# Patient Record
Sex: FEMALE | Race: WHITE | NOT HISPANIC OR LATINO | Employment: OTHER | URBAN - METROPOLITAN AREA
[De-identification: names, ages, dates, MRNs, and addresses within clinical notes are randomized per-mention and may not be internally consistent; named-entity substitution may affect disease eponyms.]

---

## 2017-02-08 ENCOUNTER — LAB VISIT (OUTPATIENT)
Dept: LAB | Facility: HOSPITAL | Age: 67
End: 2017-02-08
Attending: PSYCHIATRY & NEUROLOGY
Payer: MEDICARE

## 2017-02-08 DIAGNOSIS — Z79.899 ENCOUNTER FOR LONG-TERM (CURRENT) USE OF OTHER MEDICATIONS: ICD-10-CM

## 2017-02-08 DIAGNOSIS — F33.3 MAJOR DEPRESSIVE DISORDER, RECURRENT EPISODE, SEVERE, SPECIFIED AS WITH PSYCHOTIC BEHAVIOR: Primary | ICD-10-CM

## 2017-02-08 LAB
ALBUMIN SERPL BCP-MCNC: 3.2 G/DL
ALP SERPL-CCNC: 79 U/L
ALT SERPL W/O P-5'-P-CCNC: 15 U/L
ANION GAP SERPL CALC-SCNC: 12 MMOL/L
AST SERPL-CCNC: 11 U/L
BASOPHILS # BLD AUTO: 0.02 K/UL
BASOPHILS NFR BLD: 0.2 %
BILIRUB SERPL-MCNC: 0.3 MG/DL
BUN SERPL-MCNC: 22 MG/DL
CALCIUM SERPL-MCNC: 9 MG/DL
CHLORIDE SERPL-SCNC: 100 MMOL/L
CHOLEST/HDLC SERPL: 2.9 {RATIO}
CO2 SERPL-SCNC: 31 MMOL/L
CREAT SERPL-MCNC: 0.8 MG/DL
DIFFERENTIAL METHOD: ABNORMAL
EOSINOPHIL # BLD AUTO: 0.1 K/UL
EOSINOPHIL NFR BLD: 1.5 %
ERYTHROCYTE [DISTWIDTH] IN BLOOD BY AUTOMATED COUNT: 14.2 %
EST. GFR  (AFRICAN AMERICAN): >60 ML/MIN/1.73 M^2
EST. GFR  (NON AFRICAN AMERICAN): >60 ML/MIN/1.73 M^2
GLUCOSE SERPL-MCNC: 121 MG/DL
HCT VFR BLD AUTO: 34.7 %
HDL/CHOLESTEROL RATIO: 34.5 %
HDLC SERPL-MCNC: 113 MG/DL
HDLC SERPL-MCNC: 39 MG/DL
HGB BLD-MCNC: 11 G/DL
LDLC SERPL CALC-MCNC: 38.6 MG/DL
LYMPHOCYTES # BLD AUTO: 3 K/UL
LYMPHOCYTES NFR BLD: 37 %
MCH RBC QN AUTO: 27.6 PG
MCHC RBC AUTO-ENTMCNC: 31.7 %
MCV RBC AUTO: 87 FL
MONOCYTES # BLD AUTO: 0.9 K/UL
MONOCYTES NFR BLD: 10.7 %
NEUTROPHILS # BLD AUTO: 4.1 K/UL
NEUTROPHILS NFR BLD: 50.6 %
NONHDLC SERPL-MCNC: 74 MG/DL
PLATELET # BLD AUTO: 218 K/UL
PMV BLD AUTO: 9.8 FL
POTASSIUM SERPL-SCNC: 4 MMOL/L
PROT SERPL-MCNC: 6.9 G/DL
RBC # BLD AUTO: 3.99 M/UL
SODIUM SERPL-SCNC: 143 MMOL/L
TRIGL SERPL-MCNC: 177 MG/DL
WBC # BLD AUTO: 8.11 K/UL

## 2017-02-08 PROCEDURE — 36415 COLL VENOUS BLD VENIPUNCTURE: CPT

## 2017-02-08 PROCEDURE — 85025 COMPLETE CBC W/AUTO DIFF WBC: CPT

## 2017-02-08 PROCEDURE — 80061 LIPID PANEL: CPT

## 2017-02-08 PROCEDURE — 80053 COMPREHEN METABOLIC PANEL: CPT

## 2017-03-27 ENCOUNTER — PATIENT MESSAGE (OUTPATIENT)
Dept: RESEARCH | Facility: HOSPITAL | Age: 67
End: 2017-03-27

## 2018-06-04 ENCOUNTER — HOSPITAL ENCOUNTER (INPATIENT)
Facility: HOSPITAL | Age: 68
LOS: 5 days | Discharge: NURSING, REHAB OR LONG-TERM CARE | DRG: 065 | End: 2018-06-09
Attending: PSYCHIATRY & NEUROLOGY | Admitting: PSYCHIATRY & NEUROLOGY
Payer: MEDICARE

## 2018-06-04 DIAGNOSIS — I63.9 STROKE: ICD-10-CM

## 2018-06-04 DIAGNOSIS — I63.9 CVA (CEREBRAL VASCULAR ACCIDENT): ICD-10-CM

## 2018-06-04 DIAGNOSIS — M54.2 NECK PAIN: ICD-10-CM

## 2018-06-04 DIAGNOSIS — I63.511 ACUTE ISCHEMIC RIGHT MCA STROKE: ICD-10-CM

## 2018-06-04 DIAGNOSIS — I63.411 EMBOLIC STROKE INVOLVING RIGHT MIDDLE CEREBRAL ARTERY: ICD-10-CM

## 2018-06-04 DIAGNOSIS — R53.1 LEFT-SIDED WEAKNESS: ICD-10-CM

## 2018-06-04 LAB
ABO + RH BLD: NORMAL
AMPHET+METHAMPHET UR QL: NEGATIVE
APTT BLDCRRT: <21 SEC
BACTERIA #/AREA URNS AUTO: ABNORMAL /HPF
BARBITURATES UR QL SCN>200 NG/ML: NEGATIVE
BENZODIAZ UR QL SCN>200 NG/ML: NORMAL
BILIRUB UR QL STRIP: NEGATIVE
BLD GP AB SCN CELLS X3 SERPL QL: NORMAL
BZE UR QL SCN: NEGATIVE
CANNABINOIDS UR QL SCN: NEGATIVE
CHOLEST SERPL-MCNC: 109 MG/DL
CHOLEST/HDLC SERPL: 2.9 {RATIO}
CLARITY UR REFRACT.AUTO: ABNORMAL
COLOR UR AUTO: YELLOW
CREAT UR-MCNC: 54 MG/DL
ESTIMATED AVG GLUCOSE: 128 MG/DL
GLUCOSE UR QL STRIP: NEGATIVE
HBA1C MFR BLD HPLC: 6.1 %
HDLC SERPL-MCNC: 37 MG/DL
HDLC SERPL: 33.9 %
HGB UR QL STRIP: ABNORMAL
HYALINE CASTS UR QL AUTO: 0 /LPF
INR PPP: 1.1
KETONES UR QL STRIP: NEGATIVE
LDLC SERPL CALC-MCNC: 51.8 MG/DL
LEUKOCYTE ESTERASE UR QL STRIP: NEGATIVE
METHADONE UR QL SCN>300 NG/ML: NEGATIVE
MICROSCOPIC COMMENT: ABNORMAL
NITRITE UR QL STRIP: NEGATIVE
NONHDLC SERPL-MCNC: 72 MG/DL
OPIATES UR QL SCN: NEGATIVE
PCP UR QL SCN>25 NG/ML: NEGATIVE
PH UR STRIP: 5 [PH] (ref 5–8)
POCT GLUCOSE: 113 MG/DL (ref 70–110)
PROT UR QL STRIP: ABNORMAL
PROTHROMBIN TIME: 11.2 SEC
RBC #/AREA URNS AUTO: 37 /HPF (ref 0–4)
SP GR UR STRIP: 1.01 (ref 1–1.03)
SQUAMOUS #/AREA URNS AUTO: 1 /HPF
T4 FREE SERPL-MCNC: 0.84 NG/DL
TOXICOLOGY INFORMATION: NORMAL
TRIGL SERPL-MCNC: 101 MG/DL
TSH SERPL DL<=0.005 MIU/L-ACNC: <0.01 UIU/ML
URN SPEC COLLECT METH UR: ABNORMAL
UROBILINOGEN UR STRIP-ACNC: NEGATIVE EU/DL
WBC #/AREA URNS AUTO: 3 /HPF (ref 0–5)
YEAST UR QL AUTO: ABNORMAL

## 2018-06-04 PROCEDURE — 84439 ASSAY OF FREE THYROXINE: CPT

## 2018-06-04 PROCEDURE — 84443 ASSAY THYROID STIM HORMONE: CPT

## 2018-06-04 PROCEDURE — 20000000 HC ICU ROOM

## 2018-06-04 PROCEDURE — 80307 DRUG TEST PRSMV CHEM ANLYZR: CPT

## 2018-06-04 PROCEDURE — 99285 EMERGENCY DEPT VISIT HI MDM: CPT | Mod: 25

## 2018-06-04 PROCEDURE — 99283 EMERGENCY DEPT VISIT LOW MDM: CPT | Mod: ,,, | Performed by: EMERGENCY MEDICINE

## 2018-06-04 PROCEDURE — 83036 HEMOGLOBIN GLYCOSYLATED A1C: CPT

## 2018-06-04 PROCEDURE — 81001 URINALYSIS AUTO W/SCOPE: CPT

## 2018-06-04 PROCEDURE — 25000003 PHARM REV CODE 250: Performed by: NURSE PRACTITIONER

## 2018-06-04 PROCEDURE — 99223 1ST HOSP IP/OBS HIGH 75: CPT | Mod: ,,, | Performed by: PSYCHIATRY & NEUROLOGY

## 2018-06-04 PROCEDURE — 93005 ELECTROCARDIOGRAM TRACING: CPT

## 2018-06-04 PROCEDURE — 94002 VENT MGMT INPAT INIT DAY: CPT

## 2018-06-04 PROCEDURE — 85610 PROTHROMBIN TIME: CPT

## 2018-06-04 PROCEDURE — A4216 STERILE WATER/SALINE, 10 ML: HCPCS | Performed by: NURSE PRACTITIONER

## 2018-06-04 PROCEDURE — 85730 THROMBOPLASTIN TIME PARTIAL: CPT

## 2018-06-04 PROCEDURE — 86850 RBC ANTIBODY SCREEN: CPT

## 2018-06-04 PROCEDURE — 80061 LIPID PANEL: CPT

## 2018-06-04 PROCEDURE — 25500020 PHARM REV CODE 255: Performed by: PSYCHIATRY & NEUROLOGY

## 2018-06-04 PROCEDURE — 99900026 HC AIRWAY MAINTENANCE (STAT)

## 2018-06-04 PROCEDURE — 82962 GLUCOSE BLOOD TEST: CPT

## 2018-06-04 PROCEDURE — 93010 ELECTROCARDIOGRAM REPORT: CPT | Mod: ,,, | Performed by: INTERNAL MEDICINE

## 2018-06-04 RX ORDER — SODIUM,POTASSIUM PHOSPHATES 280-250MG
2 POWDER IN PACKET (EA) ORAL
Status: DISCONTINUED | OUTPATIENT
Start: 2018-06-04 | End: 2018-06-07

## 2018-06-04 RX ORDER — POTASSIUM CHLORIDE 20 MEQ/15ML
40 SOLUTION ORAL
Status: DISCONTINUED | OUTPATIENT
Start: 2018-06-04 | End: 2018-06-07

## 2018-06-04 RX ORDER — LANOLIN ALCOHOL/MO/W.PET/CERES
800 CREAM (GRAM) TOPICAL
Status: DISCONTINUED | OUTPATIENT
Start: 2018-06-04 | End: 2018-06-07

## 2018-06-04 RX ORDER — SODIUM CHLORIDE 0.9 % (FLUSH) 0.9 %
3 SYRINGE (ML) INJECTION EVERY 8 HOURS
Status: DISCONTINUED | OUTPATIENT
Start: 2018-06-04 | End: 2018-06-09 | Stop reason: HOSPADM

## 2018-06-04 RX ORDER — AMOXICILLIN 250 MG
1 CAPSULE ORAL 2 TIMES DAILY
Status: DISCONTINUED | OUTPATIENT
Start: 2018-06-04 | End: 2018-06-07

## 2018-06-04 RX ORDER — ATORVASTATIN CALCIUM 20 MG/1
40 TABLET, FILM COATED ORAL DAILY
Status: DISCONTINUED | OUTPATIENT
Start: 2018-06-05 | End: 2018-06-07

## 2018-06-04 RX ORDER — INSULIN ASPART 100 [IU]/ML
1-10 INJECTION, SOLUTION INTRAVENOUS; SUBCUTANEOUS EVERY 6 HOURS PRN
Status: DISCONTINUED | OUTPATIENT
Start: 2018-06-04 | End: 2018-06-09 | Stop reason: HOSPADM

## 2018-06-04 RX ORDER — ONDANSETRON 8 MG/1
8 TABLET, ORALLY DISINTEGRATING ORAL EVERY 8 HOURS PRN
Status: DISCONTINUED | OUTPATIENT
Start: 2018-06-04 | End: 2018-06-09 | Stop reason: HOSPADM

## 2018-06-04 RX ORDER — POTASSIUM CHLORIDE 20 MEQ/15ML
60 SOLUTION ORAL
Status: DISCONTINUED | OUTPATIENT
Start: 2018-06-04 | End: 2018-06-07

## 2018-06-04 RX ORDER — FENTANYL CITRATE 50 UG/ML
12.5 INJECTION, SOLUTION INTRAMUSCULAR; INTRAVENOUS ONCE AS NEEDED
Status: DISCONTINUED | OUTPATIENT
Start: 2018-06-05 | End: 2018-06-05

## 2018-06-04 RX ORDER — ACETAMINOPHEN 325 MG/1
650 TABLET ORAL EVERY 6 HOURS PRN
Status: DISCONTINUED | OUTPATIENT
Start: 2018-06-04 | End: 2018-06-09 | Stop reason: HOSPADM

## 2018-06-04 RX ORDER — GLUCAGON 1 MG
1 KIT INJECTION
Status: DISCONTINUED | OUTPATIENT
Start: 2018-06-04 | End: 2018-06-07

## 2018-06-04 RX ADMIN — IOHEXOL 100 ML: 350 INJECTION, SOLUTION INTRAVENOUS at 08:06

## 2018-06-04 RX ADMIN — Medication 3 ML: at 10:06

## 2018-06-05 PROBLEM — E03.8 OTHER SPECIFIED HYPOTHYROIDISM: Status: ACTIVE | Noted: 2018-06-05

## 2018-06-05 PROBLEM — M54.2 NECK PAIN: Status: ACTIVE | Noted: 2018-06-05

## 2018-06-05 PROBLEM — J44.9 COPD (CHRONIC OBSTRUCTIVE PULMONARY DISEASE): Status: ACTIVE | Noted: 2018-06-05

## 2018-06-05 LAB
ALBUMIN SERPL BCP-MCNC: 2.6 G/DL
ALBUMIN SERPL BCP-MCNC: 2.8 G/DL
ALP SERPL-CCNC: 100 U/L
ALP SERPL-CCNC: 94 U/L
ALT SERPL W/O P-5'-P-CCNC: 14 U/L
ALT SERPL W/O P-5'-P-CCNC: 15 U/L
ANION GAP SERPL CALC-SCNC: 13 MMOL/L
ANION GAP SERPL CALC-SCNC: 17 MMOL/L
AST SERPL-CCNC: 12 U/L
AST SERPL-CCNC: 14 U/L
BASOPHILS # BLD AUTO: 0.02 K/UL
BASOPHILS NFR BLD: 0.2 %
BILIRUB SERPL-MCNC: 0.3 MG/DL
BILIRUB SERPL-MCNC: 0.3 MG/DL
BUN SERPL-MCNC: 46 MG/DL
BUN SERPL-MCNC: 48 MG/DL
CA-I BLDV-SCNC: 0.85 MMOL/L
CALCIUM SERPL-MCNC: 6.6 MG/DL
CALCIUM SERPL-MCNC: 6.8 MG/DL
CHLORIDE SERPL-SCNC: 106 MMOL/L
CHLORIDE SERPL-SCNC: 107 MMOL/L
CO2 SERPL-SCNC: 15 MMOL/L
CO2 SERPL-SCNC: 19 MMOL/L
CREAT SERPL-MCNC: 1.7 MG/DL
CREAT SERPL-MCNC: 1.7 MG/DL
DIFFERENTIAL METHOD: ABNORMAL
EOSINOPHIL # BLD AUTO: 0 K/UL
EOSINOPHIL NFR BLD: 0 %
ERYTHROCYTE [DISTWIDTH] IN BLOOD BY AUTOMATED COUNT: 15.1 %
EST. GFR  (AFRICAN AMERICAN): 35.2 ML/MIN/1.73 M^2
EST. GFR  (AFRICAN AMERICAN): 35.2 ML/MIN/1.73 M^2
EST. GFR  (NON AFRICAN AMERICAN): 30.6 ML/MIN/1.73 M^2
EST. GFR  (NON AFRICAN AMERICAN): 30.6 ML/MIN/1.73 M^2
ESTIMATED PA SYSTOLIC PRESSURE: 51.16
GLUCOSE SERPL-MCNC: 101 MG/DL
GLUCOSE SERPL-MCNC: 120 MG/DL
HCT VFR BLD AUTO: 30.2 %
HGB BLD-MCNC: 9.5 G/DL
IMM GRANULOCYTES # BLD AUTO: 0.05 K/UL
IMM GRANULOCYTES NFR BLD AUTO: 0.6 %
LYMPHOCYTES # BLD AUTO: 1.2 K/UL
LYMPHOCYTES NFR BLD: 12.8 %
MAGNESIUM SERPL-MCNC: 0.9 MG/DL
MAGNESIUM SERPL-MCNC: 1.1 MG/DL
MAGNESIUM SERPL-MCNC: 1.8 MG/DL
MCH RBC QN AUTO: 27.6 PG
MCHC RBC AUTO-ENTMCNC: 31.5 G/DL
MCV RBC AUTO: 88 FL
MONOCYTES # BLD AUTO: 0.7 K/UL
MONOCYTES NFR BLD: 8.1 %
NEUTROPHILS # BLD AUTO: 7 K/UL
NEUTROPHILS NFR BLD: 78.3 %
NRBC BLD-RTO: 0 /100 WBC
PHOSPHATE SERPL-MCNC: 4.6 MG/DL
PHOSPHATE SERPL-MCNC: 5 MG/DL
PLATELET # BLD AUTO: 215 K/UL
PMV BLD AUTO: 9.7 FL
POCT GLUCOSE: 126 MG/DL (ref 70–110)
POTASSIUM SERPL-SCNC: 5.2 MMOL/L
POTASSIUM SERPL-SCNC: 5.5 MMOL/L
PROT SERPL-MCNC: 6.2 G/DL
PROT SERPL-MCNC: 6.6 G/DL
RBC # BLD AUTO: 3.44 M/UL
RETIRED EF AND QEF - SEE NOTES: 70 (ref 55–65)
SODIUM SERPL-SCNC: 138 MMOL/L
SODIUM SERPL-SCNC: 139 MMOL/L
TRICUSPID VALVE REGURGITATION: ABNORMAL
WBC # BLD AUTO: 8.97 K/UL

## 2018-06-05 PROCEDURE — 83735 ASSAY OF MAGNESIUM: CPT

## 2018-06-05 PROCEDURE — 83735 ASSAY OF MAGNESIUM: CPT | Mod: 91

## 2018-06-05 PROCEDURE — 80053 COMPREHEN METABOLIC PANEL: CPT | Mod: 91

## 2018-06-05 PROCEDURE — 94761 N-INVAS EAR/PLS OXIMETRY MLT: CPT

## 2018-06-05 PROCEDURE — 27000221 HC OXYGEN, UP TO 24 HOURS

## 2018-06-05 PROCEDURE — 25000003 PHARM REV CODE 250: Performed by: PSYCHIATRY & NEUROLOGY

## 2018-06-05 PROCEDURE — 25000003 PHARM REV CODE 250: Performed by: NURSE PRACTITIONER

## 2018-06-05 PROCEDURE — 97802 MEDICAL NUTRITION INDIV IN: CPT

## 2018-06-05 PROCEDURE — 20000000 HC ICU ROOM

## 2018-06-05 PROCEDURE — G8997 SWALLOW GOAL STATUS: HCPCS | Mod: CJ

## 2018-06-05 PROCEDURE — 93306 TTE W/DOPPLER COMPLETE: CPT

## 2018-06-05 PROCEDURE — G8996 SWALLOW CURRENT STATUS: HCPCS | Mod: CM

## 2018-06-05 PROCEDURE — 25000003 PHARM REV CODE 250: Performed by: HOSPITALIST

## 2018-06-05 PROCEDURE — 92610 EVALUATE SWALLOWING FUNCTION: CPT

## 2018-06-05 PROCEDURE — 80053 COMPREHEN METABOLIC PANEL: CPT

## 2018-06-05 PROCEDURE — 63600175 PHARM REV CODE 636 W HCPCS

## 2018-06-05 PROCEDURE — 94640 AIRWAY INHALATION TREATMENT: CPT

## 2018-06-05 PROCEDURE — 82330 ASSAY OF CALCIUM: CPT

## 2018-06-05 PROCEDURE — 85025 COMPLETE CBC W/AUTO DIFF WBC: CPT

## 2018-06-05 PROCEDURE — A4216 STERILE WATER/SALINE, 10 ML: HCPCS | Performed by: NURSE PRACTITIONER

## 2018-06-05 PROCEDURE — 99223 1ST HOSP IP/OBS HIGH 75: CPT | Mod: AI,GC,, | Performed by: PSYCHIATRY & NEUROLOGY

## 2018-06-05 PROCEDURE — 93306 TTE W/DOPPLER COMPLETE: CPT | Mod: 26,,, | Performed by: INTERNAL MEDICINE

## 2018-06-05 PROCEDURE — 63600175 PHARM REV CODE 636 W HCPCS: Performed by: NURSE PRACTITIONER

## 2018-06-05 PROCEDURE — 25000242 PHARM REV CODE 250 ALT 637 W/ HCPCS: Performed by: NURSE PRACTITIONER

## 2018-06-05 PROCEDURE — 97165 OT EVAL LOW COMPLEX 30 MIN: CPT

## 2018-06-05 PROCEDURE — 84100 ASSAY OF PHOSPHORUS: CPT

## 2018-06-05 PROCEDURE — 63600175 PHARM REV CODE 636 W HCPCS: Performed by: PSYCHIATRY & NEUROLOGY

## 2018-06-05 PROCEDURE — 99233 SBSQ HOSP IP/OBS HIGH 50: CPT | Mod: ,,, | Performed by: PSYCHIATRY & NEUROLOGY

## 2018-06-05 PROCEDURE — 84100 ASSAY OF PHOSPHORUS: CPT | Mod: 91

## 2018-06-05 RX ORDER — HYDROCODONE BITARTRATE AND ACETAMINOPHEN 5; 325 MG/1; MG/1
1 TABLET ORAL EVERY 6 HOURS PRN
Status: DISCONTINUED | OUTPATIENT
Start: 2018-06-05 | End: 2018-06-09 | Stop reason: HOSPADM

## 2018-06-05 RX ORDER — ACETYLCYSTEINE 200 MG/ML
1200 SOLUTION ORAL; RESPIRATORY (INHALATION) EVERY 12 HOURS
Status: COMPLETED | OUTPATIENT
Start: 2018-06-05 | End: 2018-06-05

## 2018-06-05 RX ORDER — BACLOFEN 10 MG/1
10 TABLET ORAL 4 TIMES DAILY
Status: ON HOLD | COMMUNITY
End: 2018-06-20 | Stop reason: HOSPADM

## 2018-06-05 RX ORDER — BUSPIRONE HYDROCHLORIDE 30 MG/1
30 TABLET ORAL 2 TIMES DAILY
COMMUNITY
End: 2018-06-16 | Stop reason: CLARIF

## 2018-06-05 RX ORDER — METOPROLOL TARTRATE 25 MG/1
25 TABLET, FILM COATED ORAL 2 TIMES DAILY
Status: ON HOLD | COMMUNITY
End: 2018-06-08 | Stop reason: HOSPADM

## 2018-06-05 RX ORDER — NAPROXEN SODIUM 220 MG/1
81 TABLET, FILM COATED ORAL DAILY
Status: DISCONTINUED | OUTPATIENT
Start: 2018-06-05 | End: 2018-06-08

## 2018-06-05 RX ORDER — BUSPIRONE HYDROCHLORIDE 10 MG/1
10 TABLET ORAL 2 TIMES DAILY
Status: DISCONTINUED | OUTPATIENT
Start: 2018-06-05 | End: 2018-06-09 | Stop reason: HOSPADM

## 2018-06-05 RX ORDER — ASPIRIN 81 MG/1
TABLET ORAL
Status: DISPENSED
Start: 2018-06-05 | End: 2018-06-06

## 2018-06-05 RX ORDER — ALPRAZOLAM 0.25 MG/1
0.25 TABLET ORAL 2 TIMES DAILY PRN
Status: ON HOLD | COMMUNITY
End: 2018-06-20 | Stop reason: HOSPADM

## 2018-06-05 RX ORDER — METFORMIN HYDROCHLORIDE 1000 MG/1
1000 TABLET, FILM COATED, EXTENDED RELEASE ORAL
Status: ON HOLD | COMMUNITY
End: 2018-06-14 | Stop reason: HOSPADM

## 2018-06-05 RX ORDER — SODIUM CHLORIDE 9 MG/ML
INJECTION, SOLUTION INTRAVENOUS CONTINUOUS
Status: DISCONTINUED | OUTPATIENT
Start: 2018-06-05 | End: 2018-06-08

## 2018-06-05 RX ORDER — BUPROPION HYDROCHLORIDE 300 MG/1
300 TABLET ORAL DAILY
COMMUNITY

## 2018-06-05 RX ORDER — MAGNESIUM SULFATE HEPTAHYDRATE 40 MG/ML
2 INJECTION, SOLUTION INTRAVENOUS ONCE
Status: COMPLETED | OUTPATIENT
Start: 2018-06-05 | End: 2018-06-05

## 2018-06-05 RX ORDER — NAPROXEN SODIUM 220 MG/1
81 TABLET, FILM COATED ORAL DAILY
Status: DISCONTINUED | OUTPATIENT
Start: 2018-06-06 | End: 2018-06-05

## 2018-06-05 RX ORDER — FENTANYL CITRATE 50 UG/ML
12.5 INJECTION, SOLUTION INTRAMUSCULAR; INTRAVENOUS ONCE
Status: COMPLETED | OUTPATIENT
Start: 2018-06-05 | End: 2018-06-05

## 2018-06-05 RX ORDER — FENTANYL CITRATE 50 UG/ML
INJECTION, SOLUTION INTRAMUSCULAR; INTRAVENOUS
Status: COMPLETED
Start: 2018-06-05 | End: 2018-06-05

## 2018-06-05 RX ORDER — LIDOCAINE 50 MG/G
1 PATCH TOPICAL
Status: DISCONTINUED | OUTPATIENT
Start: 2018-06-05 | End: 2018-06-09 | Stop reason: HOSPADM

## 2018-06-05 RX ORDER — HEPARIN SODIUM 5000 [USP'U]/ML
5000 INJECTION, SOLUTION INTRAVENOUS; SUBCUTANEOUS EVERY 8 HOURS
Status: DISCONTINUED | OUTPATIENT
Start: 2018-06-05 | End: 2018-06-07

## 2018-06-05 RX ORDER — QUETIAPINE FUMARATE 200 MG/1
200 TABLET, FILM COATED ORAL 2 TIMES DAILY
Status: ON HOLD | COMMUNITY
End: 2018-06-14 | Stop reason: HOSPADM

## 2018-06-05 RX ORDER — IPRATROPIUM BROMIDE AND ALBUTEROL SULFATE 2.5; .5 MG/3ML; MG/3ML
3 SOLUTION RESPIRATORY (INHALATION) EVERY 6 HOURS
Status: DISCONTINUED | OUTPATIENT
Start: 2018-06-05 | End: 2018-06-06

## 2018-06-05 RX ORDER — SIMVASTATIN 40 MG/1
40 TABLET, FILM COATED ORAL NIGHTLY
Status: ON HOLD | COMMUNITY
End: 2018-06-08 | Stop reason: HOSPADM

## 2018-06-05 RX ORDER — SERTRALINE HYDROCHLORIDE 50 MG/1
50 TABLET, FILM COATED ORAL DAILY
Status: ON HOLD | COMMUNITY
End: 2018-06-12

## 2018-06-05 RX ADMIN — IPRATROPIUM BROMIDE AND ALBUTEROL SULFATE 3 ML: .5; 3 SOLUTION RESPIRATORY (INHALATION) at 08:06

## 2018-06-05 RX ADMIN — BUSPIRONE HYDROCHLORIDE 10 MG: 10 TABLET ORAL at 12:06

## 2018-06-05 RX ADMIN — FENTANYL CITRATE 12.5 MCG: 50 INJECTION, SOLUTION INTRAMUSCULAR; INTRAVENOUS at 07:06

## 2018-06-05 RX ADMIN — SODIUM CHLORIDE: 0.9 INJECTION, SOLUTION INTRAVENOUS at 06:06

## 2018-06-05 RX ADMIN — HYDROCODONE BITARTRATE AND ACETAMINOPHEN 1 TABLET: 5; 325 TABLET ORAL at 07:06

## 2018-06-05 RX ADMIN — HEPARIN SODIUM 5000 UNITS: 5000 INJECTION, SOLUTION INTRAVENOUS; SUBCUTANEOUS at 09:06

## 2018-06-05 RX ADMIN — BACLOFEN 5 MG: 10 TABLET ORAL at 09:06

## 2018-06-05 RX ADMIN — LIDOCAINE 1 PATCH: 50 PATCH TOPICAL at 11:06

## 2018-06-05 RX ADMIN — ACETYLCYSTEINE 1200 MG: 200 INHALANT RESPIRATORY (INHALATION) at 12:06

## 2018-06-05 RX ADMIN — MAGNESIUM SULFATE IN WATER 2 G: 40 INJECTION, SOLUTION INTRAVENOUS at 06:06

## 2018-06-05 RX ADMIN — ACETAMINOPHEN 650 MG: 325 TABLET ORAL at 09:06

## 2018-06-05 RX ADMIN — BACLOFEN 5 MG: 10 TABLET ORAL at 05:06

## 2018-06-05 RX ADMIN — Medication 3 ML: at 09:06

## 2018-06-05 RX ADMIN — BUSPIRONE HYDROCHLORIDE 10 MG: 10 TABLET ORAL at 09:06

## 2018-06-05 RX ADMIN — ACETYLCYSTEINE 1200 MG: 200 INHALANT RESPIRATORY (INHALATION) at 09:06

## 2018-06-05 RX ADMIN — SODIUM CHLORIDE 500 ML: 0.9 INJECTION, SOLUTION INTRAVENOUS at 06:06

## 2018-06-05 RX ADMIN — STANDARDIZED SENNA CONCENTRATE AND DOCUSATE SODIUM 1 TABLET: 8.6; 5 TABLET, FILM COATED ORAL at 09:06

## 2018-06-05 RX ADMIN — ATORVASTATIN CALCIUM 40 MG: 20 TABLET, FILM COATED ORAL at 08:06

## 2018-06-05 RX ADMIN — ASPIRIN 81 MG CHEWABLE TABLET 81 MG: 81 TABLET CHEWABLE at 11:06

## 2018-06-05 RX ADMIN — HYDROCODONE BITARTRATE AND ACETAMINOPHEN 1 TABLET: 5; 325 TABLET ORAL at 11:06

## 2018-06-05 RX ADMIN — FENTANYL CITRATE 12.5 MCG: 50 INJECTION, SOLUTION INTRAMUSCULAR; INTRAVENOUS at 12:06

## 2018-06-05 RX ADMIN — STANDARDIZED SENNA CONCENTRATE AND DOCUSATE SODIUM 1 TABLET: 8.6; 5 TABLET, FILM COATED ORAL at 08:06

## 2018-06-05 RX ADMIN — Medication 3 ML: at 06:06

## 2018-06-05 RX ADMIN — ACETAMINOPHEN 650 MG: 325 TABLET ORAL at 03:06

## 2018-06-05 RX ADMIN — LEVOTHYROXINE SODIUM 175 MCG: 150 TABLET ORAL at 06:06

## 2018-06-05 RX ADMIN — BACLOFEN 5 MG: 10 TABLET ORAL at 12:06

## 2018-06-05 NOTE — PROGRESS NOTES
[facility]  Vascular Neurology  Comprehensive Stroke Center  Progress Note    Assessment/Plan:     Acute ischemic right MCA stroke    67 year old female with left sided weakness, gaze preference, and speech diffculties found down at home, intubated and sedated prior to transfer for potential thrombectomy after tpa treatment. Admit to [facility]    Antithrombotics for secondary stroke prevention: Antiplatelets: None: Hold all Antithrombotics x 24 hours after IV t-PA administration, start after tpa window is up    Statins for secondary stroke prevention and hyperlipidemia, if present:   Can hold or resume home med, LDL 51    Aggressive risk factor modification: HTN, DM, HLD     Rehab efforts: PT/OT/SLP to evaluate and treat when appropriate     Diagnostics ordered/pending:  Echo and MRI     VTE prophylaxis: None: Reason for No Pharmacological VTE Prophylaxis: Mechanical prophylaxis: Place SCDs, hold post tpa, can restart after tpa window     BP parameters: Infarct: Post tPA, SBP <180            Neck pain    Wearing c collar   MRI neck pending         Left-sided weakness    Concern for stroke   Noted improvement post cta   Teams recommending snf         Dyslipidemia    Takes fenofibrate at home  No statin on home meds  LDL 51        Obesity due to excess calories    Stroke risk factor  Consult nutrition for counseling when appropriate          Essential hypertension    Stroke risk factor  SBP < 180 post tpa         Diabetes    Stroke risk factor, SSI while inpatient  On metformin at home               6/5/18 - improvement overnight, extubated. No noted weakness or speech difficulties     STROKE DOCUMENTATION   Acute Stroke Times   Last Known Normal Date: 06/04/18  Last Known Normal Time: 1700  Symptom Onset Date: 06/04/18  Symptom Onset Time: 1720  Stroke Team Called Date: 06/04/18  Stroke Team Arrival Date: 06/04/18  Stroke Team Arrival Time:  (arrived in ED at 8:07 pm, PA awaiting arrival)  CT  Interpretation Time:  (initial ct read by phoebe SYKES )    NIH Scale:  1a. Level Of Consciousness: 0-->Alert: keenly responsive  1b. LOC Questions: 0-->Answers both questions correctly  1c. LOC Commands: 0-->Performs both tasks correctly  2. Best Gaze: 0-->Normal  3. Visual: 0-->No visual loss  4. Facial Palsy: 0-->Normal symmetrical movements  5a. Motor Arm, Left: 0-->No drift: limb holds 90 (or 45) degrees for full 10 secs  5b. Motor Arm, Right: 0-->No drift: limb holds 90 (or 45) degrees for full 10 secs  6a. Motor Leg, Left: 0-->No drift: leg holds 30 degree position for full 5 secs  6b. Motor Leg, Right: 0-->No drift: leg holds 30 degree position for full 5 secs  7. Limb Ataxia: 0-->Absent  8. Sensory: 0-->Normal: no sensory loss  9. Best Language: 0-->No aphasia: normal  10. Dysarthria: 0-->Normal  11. Extinction and Inattention (formerly Neglect): 0-->No abnormality  Total (NIH Stroke Scale): 0       Modified Ritchie Score: 1  [place] Coma Scale:    ABCD2 Score:    [id number] Score:   HAS -BLED Score:   ICH Score:   Hunt & Kothari Classification:      Hemorrhagic change of an Ischemic Stroke: Does this patient have an ischemic stroke with hemorrhagic changes? No     Neurologic Chief Complaint: status post tpa for left weakness     Subjective:     Interval History: Patient is seen for follow-up neurological assessment and treatment recommendations: improvement overnight, extubated. No noted weakness or speech difficulties     HPI, Past Medical, Family, and Social History remains the same as documented in the initial encounter.     Review of Systems   Constitutional: Negative for fever.   Musculoskeletal: Positive for arthralgias and neck pain.        Back and rib pain   Neurological: Negative for speech difficulty and weakness.     Scheduled Meds:   acetylcysteine 200 mg/ml (20%)  1,200 mg Oral Q12H    albuterol-ipratropium  3 mL Nebulization Q6H    atorvastatin  40 mg Oral Daily    baclofen  5 mg Oral QID     busPIRone  10 mg Oral BID    levothyroxine  175 mcg Oral Daily    lidocaine  1 patch Transdermal Q24H    senna-docusate 8.6-50 mg  1 tablet Oral BID    sodium chloride 0.9%  3 mL Intravenous Q8H     Continuous Infusions:   sodium chloride 0.9% 100 mL/hr at 06/05/18 1205     PRN Meds:acetaminophen, dextrose 50%, glucagon (human recombinant), HYDROcodone-acetaminophen, insulin aspart U-100, magnesium oxide, magnesium oxide, ondansetron, potassium chloride 10%, potassium chloride 10%, potassium chloride 10%, potassium, sodium phosphates, potassium, sodium phosphates, potassium, sodium phosphates    Objective:     Vital Signs (Most Recent):  Temp: 99.9 °F (37.7 °C) (06/05/18 1105)  Pulse: 107 (06/05/18 1200)  Resp: (!) 24 (06/05/18 1200)  BP: (!) 104/58 (06/05/18 1200)  SpO2: 96 % (06/05/18 1200)  BP Location: Left arm    Vital Signs Range (Last 24H):  Temp:  [98.1 °F (36.7 °C)-99.9 °F (37.7 °C)]   Pulse:  []   Resp:  [15-27]   BP: (104-156)/(47-68)   SpO2:  [95 %-100 %]   BP Location: Left arm    Physical Exam   Constitutional: She is oriented to person, place, and time. She appears well-developed and well-nourished.   HENT:   Head: Normocephalic and atraumatic.   Eyes: EOM are normal. Pupils are equal, round, and reactive to light.   Neck:   Wearing c collar - MRI ordered to clear   Cardiovascular: Normal rate.    Pulmonary/Chest: Effort normal.   Musculoskeletal: Normal range of motion.   Neurological: She is alert and oriented to person, place, and time.   Skin: Skin is warm.   Nursing note and vitals reviewed.      Neurological Exam:   LOC: alert  Attention Span: Good   Language: No aphasia  Articulation: No dysarthria  Orientation: Person, Place, Time   Visual Fields: Full  EOM (CN III, IV, VI): Full/intact  Facial Movement (CN VII): Symmetric facial expression    Motor: Arm left  Normal 5/5  Leg left  Normal 5/5  Arm right  Normal 5/5  Leg right Normal 5/5  Sensation: Intact to light touch,  temperature and vibration  Tone: Normal tone throughout    Laboratory:  CMP:   Recent Labs  Lab 06/05/18  0514   CALCIUM 6.6*   ALBUMIN 2.6*   PROT 6.2      K 5.2*   CO2 19*      BUN 46*   CREATININE 1.7*   ALKPHOS 94   ALT 15   AST 12   BILITOT 0.3     CBC:   Recent Labs  Lab 06/05/18  0328   WBC 8.97   RBC 3.44*   HGB 9.5*   HCT 30.2*      MCV 88   MCH 27.6   MCHC 31.5*     Lipid Panel:   Recent Labs  Lab 06/04/18 2049   CHOL 109*   LDLCALC 51.8*   HDL 37*   TRIG 101     Coagulation:   Recent Labs  Lab 06/04/18 2049   INR 1.1   APTT <21.0     Platelet Aggregation Study: No results for input(s): PLTAGG, PLTAGINTERP, PLTAGREGLACO, ADPPLTAGGREG in the last 168 hours.  Hgb A1C:   Recent Labs  Lab 06/04/18 2049   HGBA1C 6.1*     TSH:   Recent Labs  Lab 06/04/18 2049   TSH <0.010*         Diagnostic Results:  CTA MP 6/4/18 - reviewed with Dr España in real time   Radiology read- Possible right frontal area of hypoattenuation with evaluation limited by motion artifact.  Additional left centrum semiovale area of hypoattenuation possibly a recent or remote infarction.    Mild luminal irregularity of the right proximal MCA as seen on series 5, image 1442.  No evidence of large vessel occlusion in the intracranial circulation.    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  The overall evaluation is difficult given motion within this region.  Outpatient repeat CTA of the head is suggested for further evaluation.    No hemodynamically significant stenosis in the neck vessels.       Echo and MRI pending         XAVI Cunha  Comprehensive Stroke Center  Department of Vascular Neurology   [facility]

## 2018-06-05 NOTE — H&P
[facility]  Neurocritical Care  History & Physical    Admit Date: 6/4/2018  Service Date: 06/05/2018  Length of Stay: 1    Subjective:     Chief Complaint: <principal problem not specified>    History of Present Illness: 67 year old Female with PMHx of HTN, HLD, DM and COPD who was transferred from [facility] after receiving tPA for left sided weakness and right gaze preference. Patient's last known normal was about 5:00 pm day of presentation when her  spoke with her on the phone. About 20 minutes later he found her unresponsive on the floor. At the outside hospital, she was found to be minimally responsive with left sided weakness and right gaze preference. She was intubated for airway protection, and stroke code was called. Patient received IV tPA and was transferred to [facility]. No LVO was identified on CTA. Patient will be admitted to [facility].        Past Medical History:   Diagnosis Date    CVA (cerebral vascular accident) 6/4/2018    Depression 2/24/2015    Diabetes mellitus     Emphysema 2/24/2015    Hx smoking; home oxygen 2 l for past 4 years    Encounter for blood transfusion     Hepatomegaly 2/24/2015    Found on Routine imaging    HTN (hypertension) 2/24/2015    Hyperlipidemia     Sleep apnea 2/24/2015     Past Surgical History:   Procedure Laterality Date    CARDIAC CATHETERIZATION      CHOLECYSTECTOMY      dislocated hip      traction    HEMORRHOID SURGERY      HERNIA REPAIR      with mesh    NISSEN FUNDOPLICATION      THYROID SURGERY        No current facility-administered medications on file prior to encounter.      Current Outpatient Prescriptions on File Prior to Encounter   Medication Sig Dispense Refill    albuterol (PROVENTIL) 5 mg/mL nebulizer solution Take 2.5 mg by nebulization every 6 (six) hours as needed for Wheezing.      alendronate (FOSAMAX) 70 MG tablet Take 70 mg by mouth every 7 days.      alprazolam (XANAX) 0.5 MG tablet  Take 0.5 mg by mouth 2 (two) times daily as needed for Anxiety.      amitriptyline (ELAVIL) 50 MG tablet Take 50 mg by mouth every evening.      ascorbic acid (VITAMIN C) 500 MG tablet Take 500 mg by mouth once daily.      aspirin 81 MG Chew Take 81 mg by mouth every evening. Two 81mg  Once a day      buPROPion (WELLBUTRIN XL) 150 MG TB24 tablet Take 150 mg by mouth once daily.      busPIRone (BUSPAR) 15 MG tablet Take 15 mg by mouth 2 (two) times daily.      calcium carbonate (OS-CAREY) 600 mg (1,500 mg) Tab Take 600 mg by mouth 2 (two) times daily with meals.      chlorzoxazone (PARAFON FORTE) 500 mg Tab Take 500 mg by mouth 4 (four) times daily as needed.      citalopram (CELEXA) 40 MG tablet Take 40 mg by mouth once daily. 1/2 tablet twice daily      ergocalciferol (VITAMIN D2) 50,000 unit Cap Take 800 Units by mouth every 7 days.      ezetimibe-simvastatin 10-40 mg (VYTORIN) 10-40 mg per tablet Take 1 tablet by mouth every evening.      fenofibrate (TRICOR) 145 MG tablet Take 145 mg by mouth once daily.      ferrous sulfate 325 (65 FE) MG EC tablet Take 65 mg by mouth 3 (three) times daily with meals.      furosemide (LASIX) 20 MG tablet Take 1 tablet (20 mg total) by mouth 2 (two) times daily. 60 tablet 6    glimepiride (AMARYL) 2 MG tablet Take 2 mg by mouth before breakfast.      hydrOXYzine (ATARAX) 50 MG tablet Take 50 mg by mouth nightly.      ipratropium (ATROVENT HFA) 17 mcg/actuation inhaler Inhale 2 puffs into the lungs 4 (four) times daily.      levothyroxine (SYNTHROID, LEVOTHROID) 175 MCG tablet Take 175 mcg by mouth once daily.      meloxicam (MOBIC) 7.5 MG tablet Take 7.5 mg by mouth once daily.      metformin (GLUCOPHAGE) 1000 MG tablet Take 1,000 mg by mouth 4 (four) times daily.      metoprolol succinate (TOPROL-XL) 50 MG 24 hr tablet Take 50 mg by mouth 2 (two) times daily as needed.      mometasone-formoterol (DULERA) 100-5 mcg/actuation HFAA Inhale 1 puff into the lungs 2  (two) times daily. 1 Inhaler 1    pantoprazole (PROTONIX) 40 MG tablet Take 40 mg by mouth once daily.      potassium chloride SA (K-DUR,KLOR-CON) 10 MEQ tablet Take 10 mEq by mouth 2 (two) times daily.      quetiapine (SEROQUEL) 100 MG Tab Take by mouth 3 (three) times daily as needed.      quetiapine 200 mg oral tb24 (SEROQUEL XR) 200 MG Tb24 Take 400 mg by mouth every evening.       tiotropium (SPIRIVA) 18 mcg inhalation capsule Inhale 18 mcg into the lungs once daily.        Allergies: Codeine    Family History   Problem Relation Age of Onset    Emphysema Mother     Lung cancer Mother     Coronary artery disease Father     Stroke Brother     Coronary artery disease Brother     SIDS Daughter     No Known Problems Sister     No Known Problems Sister     No Known Problems Sister     No Known Problems Son     No Known Problems Son     No Known Problems Son     No Known Problems Maternal Grandmother     No Known Problems Maternal Grandfather     No Known Problems Paternal Grandmother     No Known Problems Paternal Grandfather     No Known Problems Maternal Aunt     No Known Problems Maternal Uncle     No Known Problems Paternal Aunt     No Known Problems Paternal Uncle     Anemia Neg Hx     Arrhythmia Neg Hx     Asthma Neg Hx     Clotting disorder Neg Hx     Fainting Neg Hx     Heart attack Neg Hx     Heart disease Neg Hx     Heart failure Neg Hx     Hyperlipidemia Neg Hx     Hypertension Neg Hx     Atrial Septal Defect Neg Hx      Social History   Substance Use Topics    Smoking status: Former Smoker     Packs/day: 3.00     Years: 47.00     Quit date: 3/21/2012    Smokeless tobacco: Not on file    Alcohol use No     Review of Systems   Unable to perform ROS: Intubated     Objective:     Vitals:    Temp: 98.1 °F (36.7 °C)  Pulse: 94  Rhythm: normal sinus rhythm  BP: 126/65  MAP (mmHg): 87  Resp: 20  SpO2: 100 %  Oxygen Concentration (%): 50  O2 Device (Oxygen Therapy): nasal  cannula  Vent Mode: A/C  Set Rate: 16 bmp  Vt Set: 470 mL  Pressure Support: 0 cmH20  PEEP/CPAP: 5 cmH20  Peak Airway Pressure: 40 cmH2O  Mean Airway Pressure: 15 cmH20  Plateau Pressure: 0 cmH20    Temp  Min: 98.1 °F (36.7 °C)  Max: 98.1 °F (36.7 °C)  Pulse  Min: 76  Max: 95  BP  Min: 116/47  Max: 156/57  MAP (mmHg)  Min: 80  Max: 87  Resp  Min: 16  Max: 23  SpO2  Min: 97 %  Max: 100 %  Oxygen Concentration (%)  Min: 50  Max: 50    No intake/output data recorded.           Physical Exam   Constitutional: She appears well-developed and well-nourished.   HENT:   Head: Normocephalic.   Neck:   C collar in place   Cardiovascular: Normal rate.    Pulmonary/Chest:   intubated   Neurological: She is alert.   Skin: Skin is warm and dry.   Nursing note reviewed.  Intubated  Alert; nodding head appropriately to questioning  Moving all extremities spontaneously  Able to follow commands     Today I personally reviewed pertinent medications, imaging, lab results, notably:        Assessment/Plan:     Neuro   Acute ischemic right MCA stroke    -Patient presented with left sided weakness and right gaze preference  -s/p IV tPA ; start time 1824 and end time 1924  -No LVO on CTA  -Admit to [facility]  -q1 hr neuro checks  -SBP <180  -PT/OT/ST  -A1C and lipid panel ordered  -Echo pending  -MRI brain ordered   -Vascular Neurology consulted          Pulmonary   COPD (chronic obstructive pulmonary disease)    -Patient on 2L oxygen at home   -Continue 2L oxygen with NC        Cardiac/Vascular   Dyslipidemia    -Lipid panel ordered  -Atorvastatin 40 mg daily         Essential hypertension    -Holding home meds for now  -s/p tPA SBP <180        Endocrine   Other specified hypothyroidism    -Restart home synthroid 175 mcg        Diabetes    -Holding home glimepiride and metformin   -A1C   -SSI            Prophylaxis:  Venous Thromboembolism: mechanical  Stress Ulcer: None  Ventilator Pneumonia: not applicable     Activity Orders          None         Full Code    Kristin Sr MD  Neurocritical Care  [facility]

## 2018-06-05 NOTE — ASSESSMENT & PLAN NOTE
67 year old female with left sided weakness, gaze preference, and speech diffculties found down at home, intubated and sedated prior to transfer for potential thrombectomy after tpa treatment. Admit to [facility]    Antithrombotics for secondary stroke prevention: Antiplatelets: None: Hold all Antithrombotics x 24 hours after IV t-PA administration    Statins for secondary stroke prevention and hyperlipidemia, if present:   Can hold or resume home med, LDL 51    Aggressive risk factor modification: HTN, DM, HLD     Rehab efforts: PT/OT/SLP to evaluate and treat when appropriate     Diagnostics ordered/pending: HgbA1C to assess blood glucose levels, Lipid Profile to assess cholesterol levels, MRA head to assess vasculature, MRA neck/arch to assess vasculature, MRI head without contrast to assess brain parenchyma, TTE to assess cardiac function/status     VTE prophylaxis: None: Reason for No Pharmacological VTE Prophylaxis: Mechanical prophylaxis: Place SCDs, hold post tpa    BP parameters: Infarct: Post tPA, SBP <180

## 2018-06-05 NOTE — CONSULTS
[facility]  Vascular Neurology  Comprehensive Stroke Center  Consult Note    Inpatient consult to Vascular (Stroke) Neurology  Consult performed by: SAMRA RICE  Consult ordered by: KODI HI  Reason for consult: stroke, status post tpa        Assessment/Plan:     Patient is a 67 y.o. year old female with:    Acute ischemic right MCA stroke    67 year old female with left sided weakness, gaze preference, and speech diffculties found down at home, intubated and sedated prior to transfer for potential thrombectomy after tpa treatment. Admit to [facility]    Antithrombotics for secondary stroke prevention: Antiplatelets: None: Hold all Antithrombotics x 24 hours after IV t-PA administration    Statins for secondary stroke prevention and hyperlipidemia, if present:   Can hold or resume home med, LDL 51    Aggressive risk factor modification: HTN, DM, HLD     Rehab efforts: PT/OT/SLP to evaluate and treat when appropriate     Diagnostics ordered/pending: HgbA1C to assess blood glucose levels, Lipid Profile to assess cholesterol levels, MRA head to assess vasculature, MRA neck/arch to assess vasculature, MRI head without contrast to assess brain parenchyma, TTE to assess cardiac function/status     VTE prophylaxis: None: Reason for No Pharmacological VTE Prophylaxis: Mechanical prophylaxis: Place SCDs, hold post tpa    BP parameters: Infarct: Post tPA, SBP <180            Left-sided weakness    Concern for stroke   Noted improvement post cta         Dyslipidemia    Takes fenofibrate at home  No statin on home meds  LDL 51        Obesity due to excess calories    Stroke risk factor  Consult nutrition for counseling when appropriate          Essential hypertension    Stroke risk factor  SBP < 180 post tpa         Diabetes    Stroke risk factor, SSI while inpatient  On metformin at home              STROKE DOCUMENTATION     Acute Stroke Times   Last Known Normal Date: 06/04/18  Last Known Normal  Time: 1700  Symptom Onset Date: 06/04/18  Symptom Onset Time: 1720  Stroke Team Called Date: 06/04/18  Stroke Team Arrival Date: 06/04/18  Stroke Team Arrival Time:  (arrived in ED at 8:07 pm, PA awaiting arrival)  CT Interpretation Time:  (initial ct read by telemroman SYKES )    NIH Scale:  Interval: baseline (upon arrival/admit) (given versed prior to exam)  1a. Level Of Consciousness: 0-->Alert: keenly responsive  1b. LOC Questions: 2-->Answers neither question correctly  1c. LOC Commands: 2-->Performs neither task correctly  2. Best Gaze: 1-->Partial gaze palsy: gaze is abnormal in one or both eyes, but forced deviation or total gaze paresis is not present  3. Visual: 0-->No visual loss  4. Facial Palsy: 0-->Normal symmetrical movements  5a. Motor Arm, Left: 3-->No effort against gravity: limb falls  5b. Motor Arm, Right: 3-->No effort against gravity: limb falls  6a. Motor Leg, Left: 3-->No effort against gravity: leg falls to bed immediately  6b. Motor Leg, Right: 3-->No effort against gravity: leg falls to bed immediately  7. Limb Ataxia: 0-->Absent  8. Sensory: 0-->Normal: no sensory loss  9. Best Language: 2-->Severe aphasia: all communication is through fragmentary expression: great need for inference, questioning, and guessing by the listener. Range of information that can be exchanged is limited: listener carries burden of. . . (see row details)  10. Dysarthria: (UN) Intubated or other physical barrier  11. Extinction and Inattention (formerly Neglect): 0-->No abnormality  Total (NIH Stroke Scale): 19    Modified Tai  1  Forrest Coma Scale:    ABCD2 Score:    [id number] Score:   HAS -BLED Score:   ICH Score:   Hunt & Kothari Classification:       Thrombolysis Candidate? Yes, given prior to arrival at outside hospital      Interventional Revascularization Candidate?   Is the patient eligible for mechanical endovascular reperfusion (JULIAN)?  No; No large vessel occlusion      Hemorrhagic change of an Ischemic  Stroke: Does this patient have an ischemic stroke with hemorrhagic changes? No     Subjective:     History of Present Illness:  67 year old female who was seen via telemedicine at our [facility]. Upon arrival here, the patient was given 10 mg of versed and is intubated. Per telemed and EMS reports, patient was last known normal at 5 pm today when her  spoke to her on the phone and then she was found 20 minute later unreponsive with right gaze, left sided weakness. She was brought to our [facility] where tpa was administered for stroke symptoms and she was intubated for airway support before arrival here. The patient was becoming more responsive and slightly agitated during transport, reported to be pulling at the tube. The patient with history of HTN, HLD, DM, smoker    No known prior history of stroke.   Transferred here for CTA MP and admission to [facility] for post tpa monitoring         Past Medical History:   Diagnosis Date    CVA (cerebral vascular accident) 6/4/2018    Depression 2/24/2015    Diabetes mellitus     Emphysema 2/24/2015    Hx smoking; home oxygen 2 l for past 4 years    Encounter for blood transfusion     Hepatomegaly 2/24/2015    Found on Routine imaging    HTN (hypertension) 2/24/2015    Hyperlipidemia     Sleep apnea 2/24/2015     Past Surgical History:   Procedure Laterality Date    CARDIAC CATHETERIZATION      CHOLECYSTECTOMY      dislocated hip      traction    HEMORRHOID SURGERY      HERNIA REPAIR      with mesh    NISSEN FUNDOPLICATION      THYROID SURGERY       Family History   Problem Relation Age of Onset    Emphysema Mother     Lung cancer Mother     Coronary artery disease Father     Stroke Brother     Coronary artery disease Brother     SIDS Daughter     No Known Problems Sister     No Known Problems Sister     No Known Problems Sister     No Known Problems Son     No Known Problems Son     No Known Problems Son     No Known Problems Maternal  Grandmother     No Known Problems Maternal Grandfather     No Known Problems Paternal Grandmother     No Known Problems Paternal Grandfather     No Known Problems Maternal Aunt     No Known Problems Maternal Uncle     No Known Problems Paternal Aunt     No Known Problems Paternal Uncle     Anemia Neg Hx     Arrhythmia Neg Hx     Asthma Neg Hx     Clotting disorder Neg Hx     Fainting Neg Hx     Heart attack Neg Hx     Heart disease Neg Hx     Heart failure Neg Hx     Hyperlipidemia Neg Hx     Hypertension Neg Hx     Atrial Septal Defect Neg Hx      Social History   Substance Use Topics    Smoking status: Former Smoker     Packs/day: 3.00     Years: 47.00     Quit date: 3/21/2012    Smokeless tobacco: Not on file    Alcohol use No     Review of patient's allergies indicates:   Allergen Reactions    Codeine Palpitations       Medications: I have reviewed the current medication administration record.      (Not in a hospital admission)    Review of Systems   Constitutional: Positive for fatigue. Negative for fever.   HENT: Negative for facial swelling.    Eyes: Negative for redness.   Respiratory: Positive for shortness of breath.         (+) intubated   Cardiovascular:        (-) tachycardia   Genitourinary: Negative for hematuria.   Skin: Negative for pallor.   Neurological: Positive for speech difficulty and weakness.   Hematological:        No use of home blood thinners   Psychiatric/Behavioral: Positive for agitation and confusion.     Objective:     Vital Signs (Most Recent):  Pulse: 93 (06/04/18 2109)  Resp: 20 (06/04/18 2109)  BP: 136/62 (06/04/18 2109)  SpO2: 100 % (06/04/18 2109)    Vital Signs Range (Last 24H):  Pulse:  [76-93]   Resp:  [18-21]   BP: (116-138)/(47-64)   SpO2:  [97 %-100 %]     Physical Exam   Constitutional: She appears well-developed and well-nourished.   HENT:   Head: Normocephalic and atraumatic.   Eyes: EOM are normal. Pupils are equal, round, and reactive to light.    Gaze rightward   Cardiovascular: Normal rate.    Pulmonary/Chest:   Intubated   Abdominal: She exhibits distension.   Musculoskeletal: She exhibits no deformity.   Neurological: She is alert.   Skin: Skin is warm and dry.   Nursing note and vitals reviewed.      Neurological Exam: initially examined on arrival, shortly after versed. After CT scan patient was moving all extremities   LOC: alert  Attention Span: poor  Language: Intubated  Articulation: intubated   Orientation: Untestable due to intubation  Visual Fields: Full  EOM (CN III, IV, VI): Gaze preference  right  Pupils (CN II, III): PERRL  Facial Sensation (CN V): difficult to assess  Facial Movement (CN VII): Unable to test   Motor: Arm left  Plegia 0/5  Leg left  Plegia 0/5  Arm right  Paresis: 1/5  Leg right Paresis: 1/5  Cebellar: unable to test  Tone: Normal tone throughout      Laboratory:  CMP: No results for input(s): GLUCOSE, CALCIUM, ALBUMIN, PROT, NA, K, CO2, CL, BUN, CREATININE, ALKPHOS, ALT, AST, BILITOT in the last 24 hours.  CBC: No results for input(s): WBC, RBC, HGB, HCT, PLT, MCV, MCH, MCHC in the last 168 hours.  Lipid Panel: No results for input(s): CHOL, LDLCALC, HDL, TRIG in the last 168 hours.  Coagulation:   Recent Labs  Lab 06/04/18 2049   INR 1.1   APTT <21.0     Hgb A1C: No results for input(s): HGBA1C in the last 168 hours.  TSH: No results for input(s): TSH in the last 168 hours.    Diagnostic Results:  CTA MP 6/4/18 - reviewed with Dr España in real time   Radiology read- Possible right frontal area of hypoattenuation with evaluation limited by motion artifact.  Additional left centrum semiovale area of hypoattenuation possibly a recent or remote infarction.    Mild luminal irregularity of the right proximal MCA as seen on series 5, image 1442.  No evidence of large vessel occlusion in the intracranial circulation.    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  The overall evaluation is difficult given motion within  this region.  Outpatient repeat CTA of the head is suggested for further evaluation.    No hemodynamically significant stenosis in the neck vessels.          XAVI Cunha  [facility] Stroke [facility]  Department of Vascular Neurology   [facility]

## 2018-06-05 NOTE — SUBJECTIVE & OBJECTIVE
Neurologic Chief Complaint: status post tpa for left weakness     Subjective:     Interval History: Patient is seen for follow-up neurological assessment and treatment recommendations: improvement overnight, extubated. No noted weakness or speech difficulties     HPI, Past Medical, Family, and Social History remains the same as documented in the initial encounter.     Review of Systems   Constitutional: Negative for fever.   Musculoskeletal: Positive for arthralgias and neck pain.        Back and rib pain   Neurological: Negative for speech difficulty and weakness.     Scheduled Meds:   acetylcysteine 200 mg/ml (20%)  1,200 mg Oral Q12H    albuterol-ipratropium  3 mL Nebulization Q6H    atorvastatin  40 mg Oral Daily    baclofen  5 mg Oral QID    busPIRone  10 mg Oral BID    levothyroxine  175 mcg Oral Daily    lidocaine  1 patch Transdermal Q24H    senna-docusate 8.6-50 mg  1 tablet Oral BID    sodium chloride 0.9%  3 mL Intravenous Q8H     Continuous Infusions:   sodium chloride 0.9% 100 mL/hr at 06/05/18 1205     PRN Meds:acetaminophen, dextrose 50%, glucagon (human recombinant), HYDROcodone-acetaminophen, insulin aspart U-100, magnesium oxide, magnesium oxide, ondansetron, potassium chloride 10%, potassium chloride 10%, potassium chloride 10%, potassium, sodium phosphates, potassium, sodium phosphates, potassium, sodium phosphates    Objective:     Vital Signs (Most Recent):  Temp: 99.9 °F (37.7 °C) (06/05/18 1105)  Pulse: 107 (06/05/18 1200)  Resp: (!) 24 (06/05/18 1200)  BP: (!) 104/58 (06/05/18 1200)  SpO2: 96 % (06/05/18 1200)  BP Location: Left arm    Vital Signs Range (Last 24H):  Temp:  [98.1 °F (36.7 °C)-99.9 °F (37.7 °C)]   Pulse:  []   Resp:  [15-27]   BP: (104-156)/(47-68)   SpO2:  [95 %-100 %]   BP Location: Left arm    Physical Exam   Constitutional: She is oriented to person, place, and time. She appears well-developed and well-nourished.   HENT:   Head: Normocephalic and  atraumatic.   Eyes: EOM are normal. Pupils are equal, round, and reactive to light.   Neck:   Wearing c collar - MRI ordered to clear   Cardiovascular: Normal rate.    Pulmonary/Chest: Effort normal.   Musculoskeletal: Normal range of motion.   Neurological: She is alert and oriented to person, place, and time.   Skin: Skin is warm.   Nursing note and vitals reviewed.      Neurological Exam:   LOC: alert  Attention Span: Good   Language: No aphasia  Articulation: No dysarthria  Orientation: Person, Place, Time   Visual Fields: Full  EOM (CN III, IV, VI): Full/intact  Facial Movement (CN VII): Symmetric facial expression    Motor: Arm left  Normal 5/5  Leg left  Normal 5/5  Arm right  Normal 5/5  Leg right Normal 5/5  Sensation: Intact to light touch, temperature and vibration  Tone: Normal tone throughout    Laboratory:  CMP:   Recent Labs  Lab 06/05/18  0514   CALCIUM 6.6*   ALBUMIN 2.6*   PROT 6.2      K 5.2*   CO2 19*      BUN 46*   CREATININE 1.7*   ALKPHOS 94   ALT 15   AST 12   BILITOT 0.3     CBC:   Recent Labs  Lab 06/05/18  0328   WBC 8.97   RBC 3.44*   HGB 9.5*   HCT 30.2*      MCV 88   MCH 27.6   MCHC 31.5*     Lipid Panel:   Recent Labs  Lab 06/04/18 2049   CHOL 109*   LDLCALC 51.8*   HDL 37*   TRIG 101     Coagulation:   Recent Labs  Lab 06/04/18 2049   INR 1.1   APTT <21.0     Platelet Aggregation Study: No results for input(s): PLTAGG, PLTAGINTERP, PLTAGREGLACO, ADPPLTAGGREG in the last 168 hours.  Hgb A1C:   Recent Labs  Lab 06/04/18 2049   HGBA1C 6.1*     TSH:   Recent Labs  Lab 06/04/18 2049   TSH <0.010*         Diagnostic Results:  CTA MP 6/4/18 - reviewed with Dr España in real time   Radiology read- Possible right frontal area of hypoattenuation with evaluation limited by motion artifact.  Additional left centrum semiovale area of hypoattenuation possibly a recent or remote infarction.    Mild luminal irregularity of the right proximal MCA as seen on series 5, image  1442.  No evidence of large vessel occlusion in the intracranial circulation.    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  The overall evaluation is difficult given motion within this region.  Outpatient repeat CTA of the head is suggested for further evaluation.    No hemodynamically significant stenosis in the neck vessels.       Echo and MRI pending

## 2018-06-05 NOTE — SUBJECTIVE & OBJECTIVE
Past Medical History:   Diagnosis Date    CVA (cerebral vascular accident) 6/4/2018    Depression 2/24/2015    Diabetes mellitus     Emphysema 2/24/2015    Hx smoking; home oxygen 2 l for past 4 years    Encounter for blood transfusion     Hepatomegaly 2/24/2015    Found on Routine imaging    HTN (hypertension) 2/24/2015    Hyperlipidemia     Sleep apnea 2/24/2015     Past Surgical History:   Procedure Laterality Date    CARDIAC CATHETERIZATION      CHOLECYSTECTOMY      dislocated hip      traction    HEMORRHOID SURGERY      HERNIA REPAIR      with mesh    NISSEN FUNDOPLICATION      THYROID SURGERY        No current facility-administered medications on file prior to encounter.      Current Outpatient Prescriptions on File Prior to Encounter   Medication Sig Dispense Refill    albuterol (PROVENTIL) 5 mg/mL nebulizer solution Take 2.5 mg by nebulization every 6 (six) hours as needed for Wheezing.      alendronate (FOSAMAX) 70 MG tablet Take 70 mg by mouth every 7 days.      alprazolam (XANAX) 0.5 MG tablet Take 0.5 mg by mouth 2 (two) times daily as needed for Anxiety.      amitriptyline (ELAVIL) 50 MG tablet Take 50 mg by mouth every evening.      ascorbic acid (VITAMIN C) 500 MG tablet Take 500 mg by mouth once daily.      aspirin 81 MG Chew Take 81 mg by mouth every evening. Two 81mg  Once a day      buPROPion (WELLBUTRIN XL) 150 MG TB24 tablet Take 150 mg by mouth once daily.      busPIRone (BUSPAR) 15 MG tablet Take 15 mg by mouth 2 (two) times daily.      calcium carbonate (OS-CAREY) 600 mg (1,500 mg) Tab Take 600 mg by mouth 2 (two) times daily with meals.      chlorzoxazone (PARAFON FORTE) 500 mg Tab Take 500 mg by mouth 4 (four) times daily as needed.      citalopram (CELEXA) 40 MG tablet Take 40 mg by mouth once daily. 1/2 tablet twice daily      ergocalciferol (VITAMIN D2) 50,000 unit Cap Take 800 Units by mouth every 7 days.      ezetimibe-simvastatin 10-40 mg (VYTORIN) 10-40 mg  per tablet Take 1 tablet by mouth every evening.      fenofibrate (TRICOR) 145 MG tablet Take 145 mg by mouth once daily.      ferrous sulfate 325 (65 FE) MG EC tablet Take 65 mg by mouth 3 (three) times daily with meals.      furosemide (LASIX) 20 MG tablet Take 1 tablet (20 mg total) by mouth 2 (two) times daily. 60 tablet 6    glimepiride (AMARYL) 2 MG tablet Take 2 mg by mouth before breakfast.      hydrOXYzine (ATARAX) 50 MG tablet Take 50 mg by mouth nightly.      ipratropium (ATROVENT HFA) 17 mcg/actuation inhaler Inhale 2 puffs into the lungs 4 (four) times daily.      levothyroxine (SYNTHROID, LEVOTHROID) 175 MCG tablet Take 175 mcg by mouth once daily.      meloxicam (MOBIC) 7.5 MG tablet Take 7.5 mg by mouth once daily.      metformin (GLUCOPHAGE) 1000 MG tablet Take 1,000 mg by mouth 4 (four) times daily.      metoprolol succinate (TOPROL-XL) 50 MG 24 hr tablet Take 50 mg by mouth 2 (two) times daily as needed.      mometasone-formoterol (DULERA) 100-5 mcg/actuation HFAA Inhale 1 puff into the lungs 2 (two) times daily. 1 Inhaler 1    pantoprazole (PROTONIX) 40 MG tablet Take 40 mg by mouth once daily.      potassium chloride SA (K-DUR,KLOR-CON) 10 MEQ tablet Take 10 mEq by mouth 2 (two) times daily.      quetiapine (SEROQUEL) 100 MG Tab Take by mouth 3 (three) times daily as needed.      quetiapine 200 mg oral tb24 (SEROQUEL XR) 200 MG Tb24 Take 400 mg by mouth every evening.       tiotropium (SPIRIVA) 18 mcg inhalation capsule Inhale 18 mcg into the lungs once daily.        Allergies: Codeine    Family History   Problem Relation Age of Onset    Emphysema Mother     Lung cancer Mother     Coronary artery disease Father     Stroke Brother     Coronary artery disease Brother     SIDS Daughter     No Known Problems Sister     No Known Problems Sister     No Known Problems Sister     No Known Problems Son     No Known Problems Son     No Known Problems Son     No Known Problems  Maternal Grandmother     No Known Problems Maternal Grandfather     No Known Problems Paternal Grandmother     No Known Problems Paternal Grandfather     No Known Problems Maternal Aunt     No Known Problems Maternal Uncle     No Known Problems Paternal Aunt     No Known Problems Paternal Uncle     Anemia Neg Hx     Arrhythmia Neg Hx     Asthma Neg Hx     Clotting disorder Neg Hx     Fainting Neg Hx     Heart attack Neg Hx     Heart disease Neg Hx     Heart failure Neg Hx     Hyperlipidemia Neg Hx     Hypertension Neg Hx     Atrial Septal Defect Neg Hx      Social History   Substance Use Topics    Smoking status: Former Smoker     Packs/day: 3.00     Years: 47.00     Quit date: 3/21/2012    Smokeless tobacco: Not on file    Alcohol use No     Review of Systems   Unable to perform ROS: Intubated     Objective:     Vitals:    Temp: 98.1 °F (36.7 °C)  Pulse: 94  Rhythm: normal sinus rhythm  BP: 126/65  MAP (mmHg): 87  Resp: 20  SpO2: 100 %  Oxygen Concentration (%): 50  O2 Device (Oxygen Therapy): nasal cannula  Vent Mode: A/C  Set Rate: 16 bmp  Vt Set: 470 mL  Pressure Support: 0 cmH20  PEEP/CPAP: 5 cmH20  Peak Airway Pressure: 40 cmH2O  Mean Airway Pressure: 15 cmH20  Plateau Pressure: 0 cmH20    Temp  Min: 98.1 °F (36.7 °C)  Max: 98.1 °F (36.7 °C)  Pulse  Min: 76  Max: 95  BP  Min: 116/47  Max: 156/57  MAP (mmHg)  Min: 80  Max: 87  Resp  Min: 16  Max: 23  SpO2  Min: 97 %  Max: 100 %  Oxygen Concentration (%)  Min: 50  Max: 50    No intake/output data recorded.           Physical Exam   Constitutional: She appears well-developed and well-nourished.   HENT:   Head: Normocephalic.   Neck:   C collar in place   Cardiovascular: Normal rate.    Pulmonary/Chest:   intubated   Neurological: She is alert.   Skin: Skin is warm and dry.   Nursing note reviewed.  Intubated  Alert; nodding head appropriately to questioning  Moving all extremities spontaneously  Able to follow commands     Today I personally  reviewed pertinent medications, imaging, lab results, notably:

## 2018-06-05 NOTE — ASSESSMENT & PLAN NOTE
-Patient presented with left sided weakness and right gaze preference  -s/p IV tPA ; start time 1824 and end time 1924  -No LVO on CTA  -Admit to [facility]  -q1 hr neuro checks  -SBP <180  -PT/OT/ST  -A1C and lipid panel ordered  -Echo pending  -MRI brain ordered   -Vascular Neurology consulted

## 2018-06-05 NOTE — ED NOTES
Pt extubated by Neuro ICU and respiratory therapy. Pt tolerated well. Placed on 2L NC, 02 sats 97% at this time.

## 2018-06-05 NOTE — PLAN OF CARE
Problem: Occupational Therapy Goal  Goal: Occupational Therapy Goal  Goals to be met by: 6/15/2018    Patient will increase functional independence with ADLs by performing:    UE Dressing with Contact Guard Assistance.  LE Dressing with Minimal Assistance.  Grooming while standing at sink with Contact Guard Assistance.  Toileting from toilet with Contact Guard Assistance for hygiene and clothing management.   Sitting at edge of bed x 15 minutes with Contact Guard Assistance.  Supine to sit with Minimal Assistance.  Toilet transfer to toilet with Minimal Assistance.      OT evaluation complete and POC established.  SNF (pending EOB/OOB assessment) is recommended upon d/c from acute care to further address deficits and help pt improve overall functional independence.     SANAZ Barba  6/5/2018

## 2018-06-05 NOTE — PLAN OF CARE
Problem: Patient Care Overview  Goal: Plan of Care Review  Outcome: Ongoing (interventions implemented as appropriate)  Nutrition assessment completed. Please see RD note for details.    Recommendation/Intervention:   1.As medically able, recommend  initiating TF regimen of Glucerna 1.5@ goal of 45mL/hr.  - Initiate at 15mL/hr and advance by 10mL q4h as tolerated until goal of 45mL/hr is reached.   -Hold for residuals >500mL.   -This regimen provides 1620 kcal, 89g protein and 820 mL free water.    2. If medically able and diet advanced, recommend diabetic diet with texture per SLP. RD to monitor.    Goals: Pt will recieve nutrition with 48 hours  Nutrition Goal Status: new  Communication of RD Recs: reviewed with RN

## 2018-06-05 NOTE — ED PROVIDER NOTES
CC: Cerebrovascular Accident (Transfer from [facility]. TPA. Intubated.)      HPI: Olimpia Cardoza is a 67 y.o. year old female who presents to the ED as a transfer for [facility] neurology eval.  Pt transferred from [facility] the [facility] s/p TPA for confusion and left sided weakness, intubated        Past Medical History:   Diagnosis Date    CVA (cerebral vascular accident) 6/4/2018    Depression 2/24/2015    Diabetes mellitus     Emphysema 2/24/2015    Hx smoking; home oxygen 2 l for past 4 years    Encounter for blood transfusion     Hepatomegaly 2/24/2015    Found on Routine imaging    HTN (hypertension) 2/24/2015    Hyperlipidemia     Other specified hypothyroidism 6/5/2018    Sleep apnea 2/24/2015     Past Surgical History:   Procedure Laterality Date    CARDIAC CATHETERIZATION      CHOLECYSTECTOMY      dislocated hip      traction    HEMORRHOID SURGERY      HERNIA REPAIR      with mesh    NISSEN FUNDOPLICATION      THYROID SURGERY       Family History   Problem Relation Age of Onset    Emphysema Mother     Lung cancer Mother     Coronary artery disease Father     Stroke Brother     Coronary artery disease Brother     SIDS Daughter     No Known Problems Sister     No Known Problems Sister     No Known Problems Sister     No Known Problems Son     No Known Problems Son     No Known Problems Son     No Known Problems Maternal Grandmother     No Known Problems Maternal Grandfather     No Known Problems Paternal Grandmother     No Known Problems Paternal Grandfather     No Known Problems Maternal Aunt     No Known Problems Maternal Uncle     No Known Problems Paternal Aunt     No Known Problems Paternal Uncle     Anemia Neg Hx     Arrhythmia Neg Hx     Asthma Neg Hx     Clotting disorder Neg Hx     Fainting Neg Hx     Heart attack Neg Hx     Heart disease Neg Hx     Heart failure Neg Hx     Hyperlipidemia Neg Hx     Hypertension Neg Hx     Atrial Septal Defect Neg Hx      No  current facility-administered medications on file prior to encounter.      Current Outpatient Prescriptions on File Prior to Encounter   Medication Sig Dispense Refill    albuterol (PROVENTIL) 5 mg/mL nebulizer solution Take 2.5 mg by nebulization 3 (three) times daily.       alendronate (FOSAMAX) 70 MG tablet Take 70 mg by mouth every 7 days.      amitriptyline (ELAVIL) 50 MG tablet Take 50 mg by mouth every evening.      aspirin 81 MG Chew Take 81 mg by mouth every evening. Two 81mg  Once a day      citalopram (CELEXA) 40 MG tablet Take 40 mg by mouth once daily. 1/2 tablet twice daily      furosemide (LASIX) 20 MG tablet Take 1 tablet (20 mg total) by mouth 2 (two) times daily. (Patient taking differently: Take 20 mg by mouth once daily. ) 60 tablet 6    glimepiride (AMARYL) 2 MG tablet Take 2 mg by mouth before breakfast.      hydrOXYzine (ATARAX) 50 MG tablet Take 50 mg by mouth 4 (four) times daily as needed.       ipratropium (ATROVENT HFA) 17 mcg/actuation inhaler Inhale 2 puffs into the lungs 4 (four) times daily.      levothyroxine (SYNTHROID, LEVOTHROID) 175 MCG tablet Take 175 mcg by mouth once daily.      meloxicam (MOBIC) 7.5 MG tablet Take 7.5 mg by mouth once daily.      pantoprazole (PROTONIX) 40 MG tablet Take 40 mg by mouth once daily.      quetiapine (SEROQUEL) 100 MG Tab Take 100 mg by mouth 2 (two) times daily.       tiotropium (SPIRIVA) 18 mcg inhalation capsule Inhale 18 mcg into the lungs once daily.      alprazolam (XANAX) 0.5 MG tablet Take 0.5 mg by mouth 3 (three) times daily.       ascorbic acid (VITAMIN C) 500 MG tablet Take 500 mg by mouth once daily.      calcium carbonate (OS-CAREY) 600 mg (1,500 mg) Tab Take 600 mg by mouth 2 (two) times daily with meals.      ergocalciferol (VITAMIN D2) 50,000 unit Cap Take 800 Units by mouth every 7 days.      [DISCONTINUED] buPROPion (WELLBUTRIN XL) 150 MG TB24 tablet Take 150 mg by mouth once daily.      [DISCONTINUED]  busPIRone (BUSPAR) 15 MG tablet Take 15 mg by mouth 2 (two) times daily.      [DISCONTINUED] chlorzoxazone (PARAFON FORTE) 500 mg Tab Take 500 mg by mouth 4 (four) times daily as needed.      [DISCONTINUED] ezetimibe-simvastatin 10-40 mg (VYTORIN) 10-40 mg per tablet Take 1 tablet by mouth every evening.      [DISCONTINUED] fenofibrate (TRICOR) 145 MG tablet Take 145 mg by mouth once daily.      [DISCONTINUED] ferrous sulfate 325 (65 FE) MG EC tablet Take 65 mg by mouth 3 (three) times daily with meals.      [DISCONTINUED] metformin (GLUCOPHAGE) 1000 MG tablet Take 1,000 mg by mouth 4 (four) times daily.      [DISCONTINUED] metoprolol succinate (TOPROL-XL) 50 MG 24 hr tablet Take 50 mg by mouth 2 (two) times daily as needed.      [DISCONTINUED] mometasone-formoterol (DULERA) 100-5 mcg/actuation HFAA Inhale 1 puff into the lungs 2 (two) times daily. 1 Inhaler 1    [DISCONTINUED] potassium chloride SA (K-DUR,KLOR-CON) 10 MEQ tablet Take 10 mEq by mouth 2 (two) times daily.      [DISCONTINUED] quetiapine 200 mg oral tb24 (SEROQUEL XR) 200 MG Tb24 Take 400 mg by mouth every evening.        Codeine  Social History     Social History    Marital status:      Spouse name: N/A    Number of children: N/A    Years of education: N/A     Social History Main Topics    Smoking status: Former Smoker     Packs/day: 3.00     Years: 47.00     Quit date: 3/21/2012    Smokeless tobacco: None    Alcohol use No    Drug use: No    Sexual activity: Not Asked     Other Topics Concern    None     Social History Narrative    None       ROS:     Intubated, unable to asess    PHYSICAL EXAM:  Vitals:    06/05/18 0705   BP: (!) 131/59   Pulse: 100   Resp: (!) 22   Temp: 98.5 °F (36.9 °C)         PHYSICAL EXAM:   general: intubated  VS: triage VS reviewed  HEENT: pupils, equal, round, sluggish  CV: RRR, no m/r/g, trace LE pitting edema  Resp: intubated, bilateral ronchi  ABD:  Surgical scar well healed, ND, +  BS  Neuro:  intubated, does not follow commands, agitated, moves all 4 extremities spontaneously  Ext: no deformity, +2 symmetrical peripheral pulses          DATA & INTERVENTIONS:    LABS reviewed:  Labs Reviewed   LIPID PANEL - Abnormal; Notable for the following:        Result Value    Cholesterol 109 (*)     HDL 37 (*)     LDL Cholesterol 51.8 (*)     All other components within normal limits   TSH - Abnormal; Notable for the following:     TSH <0.010 (*)     All other components within normal limits   URINALYSIS - Abnormal; Notable for the following:     Appearance, UA Hazy (*)     Protein, UA 1+ (*)     Occult Blood UA 2+ (*)     All other components within normal limits   URINALYSIS MICROSCOPIC - Abnormal; Notable for the following:     RBC, UA 37 (*)     Yeast, UA Occasional (*)     All other components within normal limits   POCT GLUCOSE - Abnormal; Notable for the following:     POCT Glucose 113 (*)     All other components within normal limits   APTT   PROTIME-INR   DRUG SCREEN PANEL, URINE EMERGENCY   T4, FREE   TYPE & SCREEN   POCT GLUCOSE MONITORING CONTINUOUS   POCT GLUCOSE MONITORING CONTINUOUS       RADIOLOGY reviewed:  Imaging Results          X-Ray Chest AP Single View (Final result)  Result time 06/04/18 21:30:13    Final result by Odell Lizama MD (06/04/18 21:30:13)                 Impression:      Adequate positioning of endotracheal tube.  Malpositioned nasogastric tube with the side port above the level of the thoracic inlet.      Electronically signed by: Odell Lizama MD  Date:    06/04/2018  Time:    21:30             Narrative:    EXAMINATION:  XR CHEST 1 VIEW    CLINICAL HISTORY:  et tube;    TECHNIQUE:  One view of the chest.    COMPARISON:  07/14/2016.    FINDINGS:  Cardiac wires overlie the chest.  Cardiac silhouette is enlarged.  Endotracheal tube is present with the tip approximately 3-4 cm above the britton.  High positioning of the nasogastric tube overlying the upper esophagus with  the side port above the thoracic inlet.  No focal consolidation. No sizable pleural effusion. No detrimental change in lung aeration.                               CTA STROKE MULTI-PHASE (Final result)  Result time 06/04/18 21:38:36    Final result by Senthil Kirby MD (06/04/18 21:38:36)                 Impression:      Possible right frontal area of hypoattenuation with evaluation limited by motion artifact.  Additional left centrum semiovale area of hypoattenuation possibly a recent or remote infarction.    Mild luminal irregularity of the right proximal MCA as seen on series 5, image 1442.  No evidence of large vessel occlusion in the intracranial circulation.    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  The overall evaluation is difficult given motion within this region.  Outpatient repeat CTA of the head is suggested for further evaluation.    No hemodynamically significant stenosis in the neck vessels.    Additional findings as above..    Extensive motion limited examination.    Electronically signed by resident: Joseph Saldivar  Date:    06/04/2018  Time:    20:31    Electronically signed by: Senthil Kirby MD  Date:    06/04/2018  Time:    21:38             Narrative:    EXAMINATION:  CTA STROKE MULTI-PHASE    CLINICAL HISTORY:  Stroke;    TECHNIQUE:  Non contrast low dose axial images were obtained thought the head. CT angiogram was performed from the level of the britton to the top of the head following the IV administration of 100mL of Omnipaque 350.   Sagittal and coronal reconstructions and maximum intensity projection reconstructions were performed. Arterial stenosis percentages are based on NASCET measurement criteria.2 additional phases of immediate post-contrast CT images were performed through the head alone.    COMPARISON:  No priors    FINDINGS:  The not contrast portion and 1st phase of the CTA portion of the examination are limited by motion.    Intracranial Compartment:    Ventricles and  sulci are normal in size for age without evidence of hydrocephalus. No extra-axial blood or fluid collections.    In the right frontal lobe there is a subtle area of hypoattenuation which may be artifactual related to motion or may relate to early ischemic change.  Additionally in the left centrum semiovale there is a small area of hypoattenuation adjacent to the left lateral ventricle as seen on series 2, image 21 which may relate to recent or remote infarction.  No parenchymal mass, hemorrhage, or edema.    Skull/Extracranial Contents (limited evaluation): No fracture. Maxillary and paranasal sinuses are essentially clear.  Orbits are unremarkable.    There is near complete right and partial left opacification of the mastoid air cells which is nonspecific but may relate to mastoiditis in the appropriate clinical circumstance.    Non-Vascular Structures of the Neck/Thoracic Inlet (limited evaluation): Endotracheal tube and enteric tube noted.  Enteric tube terminates in the esophagus.    Aorta: Normal 3 vessel arch.  Mild aortic and branch vessel calcification    Extracranial carotid circulation: No hemodynamically significant stenosis, aneurysmal dilatation, or dissection.    Extracranial vertebral circulation: No hemodynamically significant stenosis, aneurysmal dilatation, or dissection.  The right vertebral artery is slightly smaller than the left.    Intracranial Arteries: No focal stenosis or occlusion.  In the right ICA/MCA junction there is a small mild peripheral luminal irregularity  as seen on series 5, image 1442.    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  Please note that this region is also significantly degraded secondary to motion.  The anterior cerebral artery arises from the left ICA.  No right anterior cerebral artery.    The left vertebral artery appears to give rise to a common trunk for the posterior inferior cerebellar arteries and left  anterior inferior cerebellar artery. Right AICA arises from the right vertebral. Superior cerebellar arteries arise from the basilar. Basilar artery continues as the left PCA. Right PCA arises from the right PCOM.    Venous structures (limited evaluation): Normal.                                MEDICATIONS/FLUIDS:  Medications   sodium chloride 0.9% flush 3 mL (3 mLs Intravenous Given 6/5/18 0630)   potassium chloride 10% oral solution 40 mEq (not administered)   potassium chloride 10% oral solution 40 mEq (not administered)   potassium chloride 10% oral solution 60 mEq (not administered)   magnesium oxide tablet 800 mg (not administered)   magnesium oxide tablet 800 mg (not administered)   potassium, sodium phosphates 280-160-250 mg packet 2 packet (not administered)   potassium, sodium phosphates 280-160-250 mg packet 2 packet (not administered)   potassium, sodium phosphates 280-160-250 mg packet 2 packet (not administered)   senna-docusate 8.6-50 mg per tablet 1 tablet (1 tablet Oral Not Given 6/4/18 2145)   atorvastatin tablet 40 mg (not administered)   ondansetron disintegrating tablet 8 mg (not administered)   acetaminophen tablet 650 mg (650 mg Oral Given 6/5/18 0300)   dextrose 50% injection 12.5 g (not administered)   glucagon (human recombinant) injection 1 mg (not administered)   insulin aspart U-100 pen 1-10 Units (not administered)   levothyroxine tablet 175 mcg (175 mcg Oral Given 6/5/18 0625)   0.9%  NaCl infusion ( Intravenous Verify Only 6/5/18 0705)   omnipaque 350 iohexol 100 mL (100 mLs Intravenous Given 6/4/18 2020)   sodium chloride 0.9% bolus 500 mL (500 mLs Intravenous New Bag 6/5/18 0626)   magnesium sulfate 2g in water 50mL IVPB (premix) (2 g Intravenous New Bag 6/5/18 0626)   fentaNYL injection 12.5 mcg (12.5 mcg Intravenous Given 6/5/18 0734)         MDM:   68 yo F w/ CVA (left sided weakness) s/p tpa at 6:24 pm      Consults:[facility] neuro    IMPRESSION:  1.) CVA      Dispo: Admission to  neuro ICU    Critical Care Time: N/A       Flor Corrales MD  06/05/18 1769

## 2018-06-05 NOTE — PT/OT/SLP PROGRESS
Physical Therapy      Patient Name:  Olimpia Cardoza   MRN:  [id number]    PT consult received and acknowledged.  Patient found supine in bed, laying crossways, moaning and holding her R side. C-collar in place, no C-spine x-rays in chart and no documentation.  RN stated NCC team has not rounded on patient and she is in a lot of pain.  PT eval held at this time.  Will follow up in the PM after physicians have rounded on patient and better pain management.     C-spine imaging not completed yet @1547.  Will follow up and complete PT evaluation tomorrow.     Gabby Chappell, PT   6/5/2018

## 2018-06-05 NOTE — ED TRIAGE NOTES
Pt presents to ED as transfer from [facility] for CVA. Pt presented to [facility] with left sided weakness, aphasia, and AMS. Pt was last seen normal at 1700 tonight. Pt received TPA at other facility. Bolus of 6.9mg at 1824 and infusion of 61.9mg at 1828. Pt was intubated at other facility due to unresponsive. Upon arrival pt moving all extremities.

## 2018-06-05 NOTE — PROGRESS NOTES
Patient arrived to [facility] from [facility] via ambulance tx to [facility] ED    Type of stroke/diagnosis:   R MCA Ischemic Stroke    S/S: L sided hemiparesis    TPA start 1824 and end time 1924    Skin assessment done: Y  Wounds noted:none    NCC notified: Farhan Villanueva MD

## 2018-06-05 NOTE — ED NOTES
Pt extubated to 2 lpm o2 nc per md. MD and RN at bedside. Pt tolerated well with no adverse reactions noted. Pt speaking clearly and with strong productive cough. BBS loud and equal.

## 2018-06-05 NOTE — HOSPITAL COURSE
6/4: Patient transferred from outside facility for left sided weakness s/p tPA admitted to [facility]  6/5: MRI  From 4-7 PM. S/P rTPA,, Patient improved. Patient had a fall and has broken ribs on the R side.   6/6: No change on her exam. -MRI  No evidence of acute stroke. Only chronic microvascular disease. MRI C spine some C5-6 narrowing from disc osteophyte complex. .

## 2018-06-05 NOTE — CONSULTS
"  [facility]  Adult Nutrition  Consult Note    SUMMARY     Recommendations  Recommendation/Intervention:   1.As medically able, recommend  initiating TF regimen of Glucerna 1.5@ goal of 45mL/hr.  - Initiate at 15mL/hr and advance by 10mL q4h as tolerated until goal of 45mL/hr is reached.   -Hold for residuals >500mL.   -This regimen provides 1620 kcal, 89g protein and 820 mL free water.    2. If medically able and diet advanced, recommend diabetic diet with texture per SLP. RD to monitor.    Goals: Pt will recieve nutrition with 48 hours  Nutrition Goal Status: new  Communication of RD Recs: reviewed with RN    Reason for Assessment  Reason for Assessment: consult  Diagnosis: stroke/CVA (acute ischemic right MCA stroke)  Relevant Medical History: DM, HTN, dyslipidemia, hepatomegaly, panlobular emphysema, small bowel obstruction  Interdisciplinary Rounds: attended  General Information Comments: Spoke with family at bedside, reports that pt has been eating very poorly over the past month, no more than 2-3 very small meals a day. Also reports that pt cannot tolerate meat and that it makes her nauseated. Pt currently not experiencing any N/V/D. Family unsure of UBW. No recent weight hx in EMR.  Nutrition Discharge Planning: unable to determine at this time.     Nutrition Risk Screen  Nutrition Risk Screen: no indicators present    Nutrition/Diet History  Patient Reported Diet/Restrictions/Preferences: general  Typical Food/Fluid Intake: 2-3 small meals/day  Food Preferences: no red meat  Do you have any cultural, spiritual, Anabaptism conflicts, given your current situation?: None reported  Factors Affecting Nutritional Intake: NPO    Anthropometrics  Temp: 99.9 °F (37.7 °C)  Height Method: Stated  Height: 5' 1" (154.9 cm)  Height (inches): 61 in  Weight Method: Bed Scale  Weight: 75.2 kg (165 lb 12.6 oz)  Weight (lb): 165.79 lb  Ideal Body Weight (IBW), Female: 105 lb  % Ideal Body Weight, Female " (lb): 157.9 lb  BMI (Calculated): 31.4  BMI Grade: 30 - 34.9- obesity - grade I       Lab/Procedures/Meds  Pertinent Labs Reviewed: reviewed  Pertinent Labs Comments: k 5.2, CO2 19, BUN 46, Cr 1.7, Ca 6.6, Phos 4.6, Mg 0.9, Alb 2.6, A1C 6.1, POCT glu 126  Pertinent Medications Reviewed: reviewed  Pertinent Medications Comments: Statin, fentanyl, levothyroxine, docusate, IVF    Physical Findings/Assessment  Overall Physical Appearance: obese, on oxygen therapy  Oral/Mouth Cavity: decay/carries, tooth/teeth missing  Skin: intact    Estimated/Assessed Needs  Weight Used For Calorie Calculations: 75.2 kg (165 lb 12.6 oz)  Energy Calorie Requirements (kcal): 1524 kcal  Energy Need Method: [place]-St Jeor (x 1.25)    Protein Requirements: 68-90 g/day (1.0-1.2 g/kg)  Weight Used For Protein Calculations: 75.2 kg (165 lb 12.6 oz)    Fluid Requirements (mL): 1 mL/kcal or per MD  Fluid Need Method: RDA method  RDA Method (mL): 1524      Nutrition Prescription Ordered  Current Diet Order: NPO    Evaluation of Received Nutrient/Fluid Intake  I/O: -I/O, LBM 6/4  % Intake of Estimated Energy Needs: 0-25%  % Meal Intake: NPO    Nutrition Risk  Level of Risk/Frequency of Follow-up: moderate (f/u 2x weekly)     Assessment and Plan  Nutrition Problem  Inadequate energy intake    Related to (etiology):   NPO    Signs and Symptoms (as evidenced by):   Meeting 0% of EEN/EPN    Interventions/Recommendations (treatment strategy):  Please see recs above    Nutrition Diagnosis Status:   New      Monitor and Evaluation  Food and Nutrient Intake: energy intake, food and beverage intake, enteral nutrition intake  Food and Nutrient Adminstration: diet order, enteral and parenteral nutrition administration  Anthropometric Measurements: weight, weight change, body mass index  Biochemical Data, Medical Tests and Procedures: electrolyte and renal panel, gastrointestinal profile, glucose/endocrine profile  Nutrition-Focused Physical Findings:  overall appearance, skin     Nutrition Follow-Up  RD Follow-up?: Yes    I certify that I directed the dietetic intern in service delivery and guided them using my skilled judgment. As the cosigning dietitian, I have reviewed the dietetic interns documentation and am responsible for the treatment, assessment, and plan.     Note Completed by: Nikkie Worley

## 2018-06-05 NOTE — PLAN OF CARE
[facility] [place] [place] - [address]  [address]  [place] [address]  Phone: [phone] Fax: [phone]    [facility]  [address]  [place] [address]  Phone: [phone] Fax: [phone]    [facility] - [place], [place] - [address]  [address]  [place] [address]  Phone: [phone] Fax: [phone]      This CM spoke with patient and sons at bedside, patient has C-collar on which son states she fell, and collar was place here at facility.       06/05/18 1436   Discharge Assessment   Assessment Type Discharge Planning Assessment   Confirmed/corrected address and phone number on facesheet? Yes   Assessment information obtained from? Patient;Caregiver   Expected Length of Stay (days) 3   Communicated expected length of stay with patient/caregiver yes   Prior to hospitilization cognitive status: Alert/Oriented   Prior to hospitalization functional status: Independent;Assistive Equipment   Current cognitive status: Alert/Oriented   Current Functional Status: Needs Assistance   Facility Arrived From: (via ambulance from [facility])   Lives With child(julee), adult   Able to Return to Prior Arrangements yes   Is patient able to care for self after discharge? Unable to determine at this time (comments)   Who are your caregiver(s) and their phone number(s)? (trice Cardoza [phone] & Saul Cardoza [phone])   Patient's perception of discharge disposition admitted as an inpatient   Readmission Within The Last 30 Days no previous admission in last 30 days   Patient currently being followed by outpatient case management? No   Patient currently receives any other outside agency services? No   Equipment Currently Used at Home wheelchair;walker, rolling;oxygen   Do you have any problems affording any of your prescribed medications? No   Is the patient taking medications as prescribed? yes   Does the patient have  transportation home? Yes   Transportation Available family or friend will provide   Does the patient receive services at the [facility]? No   Discharge Plan A Rehab   Discharge Plan B Home;Home Health   Patient/Family In Agreement With Plan yes   Readmission Questionnaire   Have you felt down, depressed, or hopeless? 0   Have you felt little interest or pleasure in doing things? 0     Makayla Abebe RN/BSN/FRANCIS  [phone]  [facility]

## 2018-06-05 NOTE — PLAN OF CARE
Problem: SLP Goal  Goal: SLP Goal   Pt unable to sit up due to 10/10 rib pain. Pt at risk for aspiration due to inability to sit up and C collar. Pt should remain npo. Meds can be given crushed in pureed bolus.     PABLO Clark, CCC-SLP  6/5/2018

## 2018-06-05 NOTE — CONSULTS
Inpatient consult to Physical Medicine Rehab  Consult performed by: MARION HOPE  Consult ordered by: KODI HI  Reason for consult: assess rehab needs        Reviewed patient history and current admission.  Therapy evaluations pending.  Rehab team following.  Full consult to follow.    RUY Morley, FNP-C  Physical Medicine & Rehabilitation   06/05/2018  Spectralink: [id number]

## 2018-06-05 NOTE — PT/OT/SLP EVAL
Occupational Therapy   Evaluation    Name: Olimpia Cardoza  MRN: [id number]  Admitting Diagnosis:     acute ischemic right MCA stroke    Recommendations:     Discharge Recommendations: nursing facility, skilled  Discharge Equipment Recommendations:   (TBD pending progress)  Barriers to discharge:  None    History:     Occupational Profile:  Living Environment: Pt lives with 3 sons ( is ) in [address];  Bathroom contains walk-in shower with shower chair present.  Previous level of function: Pt reports being (I) with ADLs and utilizing a rollator PTA.  Pt states after she fell she was using a wheelchair and was requiring a lot of assist with ADLs.   Roles and Routines: Pt is a mother, does not drive, states she does not have particular hobbies.  Equipment Owned:  rollator, wheelchair, shower chair  Assistance upon Discharge: Sons able to provide assist.    Past Medical History:   Diagnosis Date    CVA (cerebral vascular accident) 2018    Depression 2015    Diabetes mellitus     Emphysema 2015    Hx smoking; home oxygen 2 l for past 4 years    Encounter for blood transfusion     Hepatomegaly 2015    Found on Routine imaging    HTN (hypertension) 2015    Hyperlipidemia     Other specified hypothyroidism 2018    Sleep apnea 2015       Past Surgical History:   Procedure Laterality Date    CARDIAC CATHETERIZATION      CHOLECYSTECTOMY      dislocated hip      traction    HEMORRHOID SURGERY      HERNIA REPAIR      with mesh    NISSEN FUNDOPLICATION      THYROID SURGERY         Subjective     Chief Complaint: Severe pain in R side from fractures  Patient/Family stated goals: Resume PLOF  Communicated with: RN prior to session.  Pain/Comfort:  · Pain Rating 1: 10/10  · Location - Side 1: Right  · Location - Orientation 1: generalized  · Location 1: rib(s)  · Pain Addressed 1: Distraction, Cessation of Activity, Nurse notified  · Pain Rating Post-Intervention 1:  10/10    Patients cultural, spiritual, Advent conflicts given the current situation: None reported    Objective:     Patient found with: cervical collar, telemetry, oxygen, peripheral IV, pulse ox (continuous)    General Precautions: Standard, fall   Orthopedic Precautions:N/A   Braces: Cervical collar     Occupational Performance:    Bed Mobility:    · Unable to assess 2* severe pain.    Functional Mobility/Transfers:  · Unable to assess 2* severe pain.    Activities of Daily Living:  · Grooming: supervision for washing face with cloth while supine in bed utilizing RUE.    Cognitive/Visual Perceptual:  Cognitive/Psychosocial Skills:    -       Oriented to: Person, Place and Situation; unable to state year   -       Follows Commands/attention:Follows multistep  commands  -       Communication: clear/fluent  -       Memory: No Deficits noted  -       Safety awareness/insight to disability: intact   -       Mood/Affect/Coping skills/emotional control: Appropriate to situation    Physical Exam:  Postural examination/scapula alignment:    -       No postural abnormalities identified  Skin integrity: Visible skin intact  Edema:  None noted  Sensation: -       Intact  Motor Planning: -       WFL  Dominant hand: -       Right  Upper Extremity Range of Motion:    -       Right Upper Extremity: WFL  -       Left Upper Extremity: WFL  Upper Extremity Strength:   -       Right Upper Extremity: WFL  -       Left Upper Extremity: WFL   Strength: 4/5 both hands  Fine Motor Coordination: -       Intact  Gross motor coordination: WFL  Vision: Pt reported vision is more blurred since being in hospital  Finger to nose: Intact    Patient left supine with all lines intact, call button in reach and RN notified    AMPA 6 Click:  AMPA Total Score: 16    Treatment & Education:  *Pt educated on role of OT and POC discussed  *Whiteboard updated  Education:    Assessment:     Olimpia Cardoza is a 67 y.o. female with a medical  "diagnosis of acute ischemic right MCA stroke.  She presents with the following performance deficits affecting function: impaired endurance, impaired functional mobilty, pain, impaired self care skills, weakness.  Evaluation limited this date 2* pt experiencing 10/10 pain on R side near ribs.  Throughout session pt would shudder intermittently in pain with and without movement.  Pt demonstrates strength and ROM in (B) UE needed for ADLs that is WFL, and is able to perform grooming task with supervision while supine.  Unable to assess EOB/OOB activity 2* increased pain, but pt educated on progression of therapy.  PTA pt reports utilizing a rollator for mobility and needed some assist with ADLs.  Pt is not at PLOF and would benefit from skilled OT services to address problems listed below and increase independence with ADLs.  SNF (pending EOB/OOB assessment) is recommended upon d/c from acute care to further address deficits and help pt improve overall functional independence.       Rehab Prognosis:  Good; patient would benefit from acute skilled OT services to address these deficits and reach maximum level of function.         Clinical Decision Makin.  OT Low:  "Pt evaluation falls under low complexity for evaluation coding due to performance deficits noted in 1-3 areas as stated above and 0 co-morbities affecting current functional status. Data obtained from problem focused assessments. No modifications or assistance was required for completion of evaluation. Only brief occupational profile and history review completed."     Plan:     Patient to be seen 4 x/week to address the above listed problems via self-care/home management, therapeutic activities, therapeutic exercises, neuromuscular re-education  · Plan of Care Expires: 18  · Plan of Care Reviewed with: patient    This Plan of care has been discussed with the patient who was involved in its development and understands and is in agreement with the " identified goals and treatment plan    GOALS:    Occupational Therapy Goals        Problem: Occupational Therapy Goal    Goal Priority Disciplines Outcome Interventions   Occupational Therapy Goal     OT, PT/OT     Description:  Goals to be met by: 6/15/2018    Patient will increase functional independence with ADLs by performing:    UE Dressing with Contact Guard Assistance.  LE Dressing with Minimal Assistance.  Grooming while standing at sink with Contact Guard Assistance.  Toileting from toilet with Contact Guard Assistance for hygiene and clothing management.   Sitting at edge of bed x 15 minutes with Contact Guard Assistance.  Supine to sit with Minimal Assistance.  Toilet transfer to toilet with Minimal Assistance.                      Time Tracking:     OT Date of Treatment: 06/05/18  OT Start Time: 0824  OT Stop Time: 0840  OT Total Time (min): 16 min    Billable Minutes:Evaluation 16    SANAZ Barba  6/5/2018

## 2018-06-05 NOTE — PLAN OF CARE
SW met with Pt sons in waiting area at [facility]. Discussed therapy recs for SNF and provided list. Family to review and give choices asap. 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    Lorri Stratton, KLEBER  Neurocritical Care   [facility]  [id number]

## 2018-06-05 NOTE — SUBJECTIVE & OBJECTIVE
Past Medical History:   Diagnosis Date    CVA (cerebral vascular accident) 6/4/2018    Depression 2/24/2015    Diabetes mellitus     Emphysema 2/24/2015    Hx smoking; home oxygen 2 l for past 4 years    Encounter for blood transfusion     Hepatomegaly 2/24/2015    Found on Routine imaging    HTN (hypertension) 2/24/2015    Hyperlipidemia     Sleep apnea 2/24/2015     Past Surgical History:   Procedure Laterality Date    CARDIAC CATHETERIZATION      CHOLECYSTECTOMY      dislocated hip      traction    HEMORRHOID SURGERY      HERNIA REPAIR      with mesh    NISSEN FUNDOPLICATION      THYROID SURGERY       Family History   Problem Relation Age of Onset    Emphysema Mother     Lung cancer Mother     Coronary artery disease Father     Stroke Brother     Coronary artery disease Brother     SIDS Daughter     No Known Problems Sister     No Known Problems Sister     No Known Problems Sister     No Known Problems Son     No Known Problems Son     No Known Problems Son     No Known Problems Maternal Grandmother     No Known Problems Maternal Grandfather     No Known Problems Paternal Grandmother     No Known Problems Paternal Grandfather     No Known Problems Maternal Aunt     No Known Problems Maternal Uncle     No Known Problems Paternal Aunt     No Known Problems Paternal Uncle     Anemia Neg Hx     Arrhythmia Neg Hx     Asthma Neg Hx     Clotting disorder Neg Hx     Fainting Neg Hx     Heart attack Neg Hx     Heart disease Neg Hx     Heart failure Neg Hx     Hyperlipidemia Neg Hx     Hypertension Neg Hx     Atrial Septal Defect Neg Hx      Social History   Substance Use Topics    Smoking status: Former Smoker     Packs/day: 3.00     Years: 47.00     Quit date: 3/21/2012    Smokeless tobacco: Not on file    Alcohol use No     Review of patient's allergies indicates:   Allergen Reactions    Codeine Palpitations       Medications: I have reviewed the current medication  administration record.      (Not in a hospital admission)    Review of Systems   Constitutional: Positive for fatigue. Negative for fever.   HENT: Negative for facial swelling.    Eyes: Negative for redness.   Respiratory: Positive for shortness of breath.         (+) intubated   Cardiovascular:        (-) tachycardia   Genitourinary: Negative for hematuria.   Skin: Negative for pallor.   Neurological: Positive for speech difficulty and weakness.   Hematological:        No use of home blood thinners   Psychiatric/Behavioral: Positive for agitation and confusion.     Objective:     Vital Signs (Most Recent):  Pulse: 93 (06/04/18 2109)  Resp: 20 (06/04/18 2109)  BP: 136/62 (06/04/18 2109)  SpO2: 100 % (06/04/18 2109)    Vital Signs Range (Last 24H):  Pulse:  [76-93]   Resp:  [18-21]   BP: (116-138)/(47-64)   SpO2:  [97 %-100 %]     Physical Exam   Constitutional: She appears well-developed and well-nourished.   HENT:   Head: Normocephalic and atraumatic.   Eyes: EOM are normal. Pupils are equal, round, and reactive to light.   Gaze rightward   Cardiovascular: Normal rate.    Pulmonary/Chest:   Intubated   Abdominal: She exhibits distension.   Musculoskeletal: She exhibits no deformity.   Neurological: She is alert.   Skin: Skin is warm and dry.   Nursing note and vitals reviewed.      Neurological Exam: initially examined on arrival, shortly after versed. After CT scan patient was moving all extremities   LOC: alert  Attention Span: poor  Language: Intubated  Articulation: intubated   Orientation: Untestable due to intubation  Visual Fields: Full  EOM (CN III, IV, VI): Gaze preference  right  Pupils (CN II, III): PERRL  Facial Sensation (CN V): difficult to assess  Facial Movement (CN VII): Unable to test   Motor: Arm left  Plegia 0/5  Leg left  Plegia 0/5  Arm right  Paresis: 1/5  Leg right Paresis: 1/5  Cebellar: unable to test  Tone: Normal tone throughout      Laboratory:  CMP: No results for input(s): GLUCOSE,  CALCIUM, ALBUMIN, PROT, NA, K, CO2, CL, BUN, CREATININE, ALKPHOS, ALT, AST, BILITOT in the last 24 hours.  CBC: No results for input(s): WBC, RBC, HGB, HCT, PLT, MCV, MCH, MCHC in the last 168 hours.  Lipid Panel: No results for input(s): CHOL, LDLCALC, HDL, TRIG in the last 168 hours.  Coagulation:   Recent Labs  Lab 06/04/18 2049   INR 1.1   APTT <21.0     Hgb A1C: No results for input(s): HGBA1C in the last 168 hours.  TSH: No results for input(s): TSH in the last 168 hours.    Diagnostic Results:  CTA MP 6/4/18 - reviewed with Dr España in real time   Radiology read- Possible right frontal area of hypoattenuation with evaluation limited by motion artifact.  Additional left centrum semiovale area of hypoattenuation possibly a recent or remote infarction.    Mild luminal irregularity of the right proximal MCA as seen on series 5, image 1442.  No evidence of large vessel occlusion in the intracranial circulation.    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  The overall evaluation is difficult given motion within this region.  Outpatient repeat CTA of the head is suggested for further evaluation.    No hemodynamically significant stenosis in the neck vessels.

## 2018-06-05 NOTE — HOSPITAL COURSE
6/5/18 - improvement overnight, extubated. No noted weakness or speech difficulties   6/6/18 discussed HPI with patient's son who stated that when he found his mother down, she had BL weakness and was moving her eyes left and right. Patient denies having unilateral weakness after falling.   6/7-8: NAEON. No concerns for worsening NIHSS.   6/9/18 - rate better controlled. Back and rib pain remains- with hematoma and ecchymosis, improved with warm compresses    Patient admitted status post tpa to icu for monitoring and neuro assessments.   The patient with significant improvement in exam.  Some pain due to trauma (noted T3 compression deformity -xray) and ecchymosis to ribs due to potential rib fx post being found down .hematoma at the site but improving with warm compresses.     Heart rate elevated during stay but was able to be controlled with medication.     Admit to [facility].    Inpatient acute stroke work up completed and patient is stable for discharge.  Please see imaging and discharge medication list below.

## 2018-06-05 NOTE — PT/OT/SLP EVAL
"Speech Language Pathology Evaluation  Bedside Swallow    Patient Name:  Olimpia Cardoza   MRN:  [id number]  Admitting Diagnosis: acute ischemic right MCA stroke    Recommendations:                 General Recommendations:  Dysphagia therapy and Speech language evaluation  Diet recommendations:  NPO, NPO   Aspiration Precautions: Meds crushed in puree and Strict aspiration precautions   General Precautions: Standard, NPO, fall, aspiration  Communication strategies:  none    History:     Past Medical History:   Diagnosis Date    CVA (cerebral vascular accident) 6/4/2018    Depression 2/24/2015    Diabetes mellitus     Emphysema 2/24/2015    Hx smoking; home oxygen 2 l for past 4 years    Encounter for blood transfusion     Hepatomegaly 2/24/2015    Found on Routine imaging    HTN (hypertension) 2/24/2015    Hyperlipidemia     Other specified hypothyroidism 6/5/2018    Sleep apnea 2/24/2015       Past Surgical History:   Procedure Laterality Date    CARDIAC CATHETERIZATION      CHOLECYSTECTOMY      dislocated hip      traction    HEMORRHOID SURGERY      HERNIA REPAIR      with mesh    NISSEN FUNDOPLICATION      THYROID SURGERY         Social History: Patient lives with her 3 sons    Prior Intubation HX: intubated briefly and extubated 6/4    Prior diet: regular/thin    Subjective   "It hurst so bad" referring to her ribs    Pain/Comfort:  Pain Rating 1: 10/10  Location 1: rib(s)  Pain Addressed 1: Distraction, Nurse notified, Cessation of Activity  Pain Rating Post-Intervention 1: 10/10    Objective:     Oral Musculature Evaluation  Oral Musculature: WFL  Dentition: present and adequate  Mucosal Quality: adequate  Oral Labial Strength and Mobility: WFL  Velar Elevation: WFL  Buccal Strength and Mobility: WFL  Volitional Cough: decreased; impacted by pain upon coughing  Voice Prior to PO Intake: clear; slightly hoarse    Bedside Swallow Eval:   Consistencies Assessed:  · Thin liquids via straw x2  · Puree " via teaspoon x4     Oral Phase:   · WFL    Pharyngeal Phase:   · coughing/choking    Compensatory Strategies  · None    Treatment: Pt unable to sit up due to extreme rib pain. Nursing aware. Pt with C collar in place. Nursing reported pt taking pills whole with water. Pt given water via straw x2 in mostly recline positioned. Pt with cough noted on 1/2 swallows. Also O2 stats dropped to 87% and pt needed a few minutes for O2 stats to return to her baseline of 93% . Pt tolerated pureed bolus without difficulty. Pt complaining of pain and wincing in pain all during session even when not moving. She reported it hurt to cough as well as it felt difficult to produce a strong cough. Discussed s/s of aspiration and risk for aspiration with pt and son. Discussed pt remaining npo at this time and meds should be given crushed in pureed bolus. Pt and family agreed. White board updated. Unable to initiate speech lang eval due to significant pain.     Assessment:     Olimpia Cardoza is a 67 y.o. female with an SLP diagnosis of Dysphagia.  She presents with decreased ability to participate in session due to significant rib pain.     Goals:    SLP Goals        Problem: SLP Goal    Goal Priority Disciplines Outcome   SLP Goal     SLP    Description:  Goals due ton 6/12  1.Pt will participate in ongoing swallow assessment  2 pt will participate in sle                    Plan:     · Patient to be seen:  5 x/week   · Plan of Care expires:     · Plan of Care reviewed with:  patient, son   · SLP Follow-Up:  Yes       Discharge recommendations:  nursing facility, skilled       Time Tracking:     SLP Treatment Date:   06/05/18  Speech Start Time:  0942  Speech Stop Time:  0956     Speech Total Time (min):  14 min    Billable Minutes: Eval Swallow and Oral Function 14    PABLO Clark, CCC-SLP  06/05/2018

## 2018-06-05 NOTE — ED NOTES
Pt recd from ems to ed. Pt is orally intubated via 7.5mm ett secured at 22 cm marques at lip. Pt to ct via 100% o2 and bvm. Pt  Then to ed room and placed on pb 840 vent at settings as ordered. Pt tolerated well with no adverse reactions noted.

## 2018-06-06 LAB
ALBUMIN SERPL BCP-MCNC: 2.6 G/DL
ALP SERPL-CCNC: 91 U/L
ALT SERPL W/O P-5'-P-CCNC: 12 U/L
ANION GAP SERPL CALC-SCNC: 13 MMOL/L
AST SERPL-CCNC: 12 U/L
BASOPHILS # BLD AUTO: 0.03 K/UL
BASOPHILS NFR BLD: 0.4 %
BILIRUB SERPL-MCNC: 0.3 MG/DL
BUN SERPL-MCNC: 29 MG/DL
CALCIUM SERPL-MCNC: 6.8 MG/DL
CHLORIDE SERPL-SCNC: 109 MMOL/L
CO2 SERPL-SCNC: 18 MMOL/L
CREAT SERPL-MCNC: 1.3 MG/DL
DIFFERENTIAL METHOD: ABNORMAL
EOSINOPHIL # BLD AUTO: 0 K/UL
EOSINOPHIL NFR BLD: 0.5 %
ERYTHROCYTE [DISTWIDTH] IN BLOOD BY AUTOMATED COUNT: 15.5 %
EST. GFR  (AFRICAN AMERICAN): 48.7 ML/MIN/1.73 M^2
EST. GFR  (NON AFRICAN AMERICAN): 42.3 ML/MIN/1.73 M^2
GLUCOSE SERPL-MCNC: 138 MG/DL
HCT VFR BLD AUTO: 28 %
HGB BLD-MCNC: 8.5 G/DL
IMM GRANULOCYTES # BLD AUTO: 0.11 K/UL
IMM GRANULOCYTES NFR BLD AUTO: 1.4 %
LYMPHOCYTES # BLD AUTO: 1.7 K/UL
LYMPHOCYTES NFR BLD: 20.8 %
MAGNESIUM SERPL-MCNC: 1.5 MG/DL
MCH RBC QN AUTO: 27.2 PG
MCHC RBC AUTO-ENTMCNC: 30.4 G/DL
MCV RBC AUTO: 90 FL
MONOCYTES # BLD AUTO: 0.7 K/UL
MONOCYTES NFR BLD: 8.4 %
NEUTROPHILS # BLD AUTO: 5.5 K/UL
NEUTROPHILS NFR BLD: 68.5 %
NRBC BLD-RTO: 0 /100 WBC
PHOSPHATE SERPL-MCNC: 3.5 MG/DL
PLATELET # BLD AUTO: 198 K/UL
PMV BLD AUTO: 9.4 FL
POCT GLUCOSE: 137 MG/DL (ref 70–110)
POTASSIUM SERPL-SCNC: 5 MMOL/L
PROT SERPL-MCNC: 6.3 G/DL
RBC # BLD AUTO: 3.13 M/UL
SODIUM SERPL-SCNC: 140 MMOL/L
WBC # BLD AUTO: 8 K/UL

## 2018-06-06 PROCEDURE — 25000003 PHARM REV CODE 250: Performed by: NURSE PRACTITIONER

## 2018-06-06 PROCEDURE — 20600001 HC STEP DOWN PRIVATE ROOM

## 2018-06-06 PROCEDURE — 94640 AIRWAY INHALATION TREATMENT: CPT

## 2018-06-06 PROCEDURE — 25000003 PHARM REV CODE 250: Performed by: STUDENT IN AN ORGANIZED HEALTH CARE EDUCATION/TRAINING PROGRAM

## 2018-06-06 PROCEDURE — 80053 COMPREHEN METABOLIC PANEL: CPT

## 2018-06-06 PROCEDURE — 63600175 PHARM REV CODE 636 W HCPCS: Performed by: PHYSICIAN ASSISTANT

## 2018-06-06 PROCEDURE — G8978 MOBILITY CURRENT STATUS: HCPCS | Mod: CL

## 2018-06-06 PROCEDURE — A4216 STERILE WATER/SALINE, 10 ML: HCPCS | Performed by: NURSE PRACTITIONER

## 2018-06-06 PROCEDURE — 25000242 PHARM REV CODE 250 ALT 637 W/ HCPCS: Performed by: NURSE PRACTITIONER

## 2018-06-06 PROCEDURE — 85025 COMPLETE CBC W/AUTO DIFF WBC: CPT

## 2018-06-06 PROCEDURE — 83735 ASSAY OF MAGNESIUM: CPT

## 2018-06-06 PROCEDURE — 63600175 PHARM REV CODE 636 W HCPCS: Performed by: NURSE PRACTITIONER

## 2018-06-06 PROCEDURE — 25000003 PHARM REV CODE 250: Performed by: PSYCHIATRY & NEUROLOGY

## 2018-06-06 PROCEDURE — 92523 SPEECH SOUND LANG COMPREHEN: CPT

## 2018-06-06 PROCEDURE — G8979 MOBILITY GOAL STATUS: HCPCS | Mod: CJ

## 2018-06-06 PROCEDURE — 99233 SBSQ HOSP IP/OBS HIGH 50: CPT | Mod: ,,, | Performed by: PSYCHIATRY & NEUROLOGY

## 2018-06-06 PROCEDURE — 84100 ASSAY OF PHOSPHORUS: CPT

## 2018-06-06 PROCEDURE — 97163 PT EVAL HIGH COMPLEX 45 MIN: CPT

## 2018-06-06 PROCEDURE — 92526 ORAL FUNCTION THERAPY: CPT

## 2018-06-06 PROCEDURE — 99222 1ST HOSP IP/OBS MODERATE 55: CPT | Mod: ,,, | Performed by: NURSE PRACTITIONER

## 2018-06-06 PROCEDURE — 25000003 PHARM REV CODE 250: Performed by: PHYSICIAN ASSISTANT

## 2018-06-06 PROCEDURE — 94761 N-INVAS EAR/PLS OXIMETRY MLT: CPT

## 2018-06-06 PROCEDURE — 27000221 HC OXYGEN, UP TO 24 HOURS

## 2018-06-06 PROCEDURE — 25000003 PHARM REV CODE 250: Performed by: HOSPITALIST

## 2018-06-06 RX ORDER — IPRATROPIUM BROMIDE AND ALBUTEROL SULFATE 2.5; .5 MG/3ML; MG/3ML
3 SOLUTION RESPIRATORY (INHALATION) EVERY 6 HOURS PRN
Status: DISCONTINUED | OUTPATIENT
Start: 2018-06-06 | End: 2018-06-07

## 2018-06-06 RX ORDER — MAGNESIUM SULFATE HEPTAHYDRATE 40 MG/ML
2 INJECTION, SOLUTION INTRAVENOUS ONCE
Status: COMPLETED | OUTPATIENT
Start: 2018-06-06 | End: 2018-06-06

## 2018-06-06 RX ORDER — RAMELTEON 8 MG/1
8 TABLET ORAL NIGHTLY
Status: DISCONTINUED | OUTPATIENT
Start: 2018-06-06 | End: 2018-06-09 | Stop reason: HOSPADM

## 2018-06-06 RX ADMIN — BACLOFEN 5 MG: 10 TABLET ORAL at 05:06

## 2018-06-06 RX ADMIN — LIDOCAINE 1 PATCH: 50 PATCH TOPICAL at 01:06

## 2018-06-06 RX ADMIN — BACLOFEN 5 MG: 10 TABLET ORAL at 10:06

## 2018-06-06 RX ADMIN — RAMELTEON 8 MG: 8 TABLET, FILM COATED ORAL at 10:06

## 2018-06-06 RX ADMIN — HEPARIN SODIUM 5000 UNITS: 5000 INJECTION, SOLUTION INTRAVENOUS; SUBCUTANEOUS at 02:06

## 2018-06-06 RX ADMIN — HYDROCODONE BITARTRATE AND ACETAMINOPHEN 1 TABLET: 5; 325 TABLET ORAL at 07:06

## 2018-06-06 RX ADMIN — HYDROCODONE BITARTRATE AND ACETAMINOPHEN 1 TABLET: 5; 325 TABLET ORAL at 01:06

## 2018-06-06 RX ADMIN — BACLOFEN 5 MG: 10 TABLET ORAL at 09:06

## 2018-06-06 RX ADMIN — HEPARIN SODIUM 5000 UNITS: 5000 INJECTION, SOLUTION INTRAVENOUS; SUBCUTANEOUS at 10:06

## 2018-06-06 RX ADMIN — Medication 3 ML: at 05:06

## 2018-06-06 RX ADMIN — Medication 3 ML: at 10:06

## 2018-06-06 RX ADMIN — BUSPIRONE HYDROCHLORIDE 10 MG: 10 TABLET ORAL at 09:06

## 2018-06-06 RX ADMIN — ATORVASTATIN CALCIUM 40 MG: 20 TABLET, FILM COATED ORAL at 09:06

## 2018-06-06 RX ADMIN — SODIUM CHLORIDE: 0.9 INJECTION, SOLUTION INTRAVENOUS at 01:06

## 2018-06-06 RX ADMIN — BACLOFEN 5 MG: 10 TABLET ORAL at 02:06

## 2018-06-06 RX ADMIN — Medication 800 MG: at 05:06

## 2018-06-06 RX ADMIN — IPRATROPIUM BROMIDE AND ALBUTEROL SULFATE 3 ML: .5; 3 SOLUTION RESPIRATORY (INHALATION) at 08:06

## 2018-06-06 RX ADMIN — STANDARDIZED SENNA CONCENTRATE AND DOCUSATE SODIUM 1 TABLET: 8.6; 5 TABLET, FILM COATED ORAL at 10:06

## 2018-06-06 RX ADMIN — STANDARDIZED SENNA CONCENTRATE AND DOCUSATE SODIUM 1 TABLET: 8.6; 5 TABLET, FILM COATED ORAL at 09:06

## 2018-06-06 RX ADMIN — BUSPIRONE HYDROCHLORIDE 10 MG: 10 TABLET ORAL at 10:06

## 2018-06-06 RX ADMIN — IPRATROPIUM BROMIDE AND ALBUTEROL SULFATE 3 ML: .5; 3 SOLUTION RESPIRATORY (INHALATION) at 02:06

## 2018-06-06 RX ADMIN — CALCIUM GLUCONATE 3 G: 98 INJECTION, SOLUTION INTRAVENOUS at 01:06

## 2018-06-06 RX ADMIN — ASPIRIN 81 MG CHEWABLE TABLET 81 MG: 81 TABLET CHEWABLE at 09:06

## 2018-06-06 RX ADMIN — HYDROCODONE BITARTRATE AND ACETAMINOPHEN 1 TABLET: 5; 325 TABLET ORAL at 10:06

## 2018-06-06 RX ADMIN — LEVOTHYROXINE SODIUM 175 MCG: 150 TABLET ORAL at 05:06

## 2018-06-06 RX ADMIN — MAGNESIUM SULFATE IN WATER 2 G: 40 INJECTION, SOLUTION INTRAVENOUS at 01:06

## 2018-06-06 RX ADMIN — HEPARIN SODIUM 5000 UNITS: 5000 INJECTION, SOLUTION INTRAVENOUS; SUBCUTANEOUS at 05:06

## 2018-06-06 NOTE — ASSESSMENT & PLAN NOTE
See hospital course for functional, cognitive/speech/language, and nutrition/swallow status.      Recommendations  -  Encourage mobility, OOB in chair at least 3 hours per day, and early ambulation as appropriate   -  PT/OT evaluate and treat  -  SLP speech and cognitive evaluate and treat  -  Monitor sleep disturbances and establish consistent sleep-wake cycle  -  Monitor for bowel and bladder dysfunction  -  Monitor for shoulder pain and subluxation  -  Monitor for spasticity  -  Monitor for and prevent skin breakdown and pressure ulcers  · Early mobility, repositioning/weight shifting every 20-30 minutes when sitting, turn patient every 2 hours, proper mattress/overlay and chair cushioning, pressure relief/heel protector boots  -  DVT prophylaxis:  scds  -  Reviewed discharge options (IP rehab, SNF, HH therapy, and OP therapy)

## 2018-06-06 NOTE — PLAN OF CARE
Problem: SLP Goal  Goal: SLP Goal  Speech Language Pathology Goals  Goals expected to be met by 6/13  1. Pt will tolerate puree diet with thin liquids and no overt s/s of aspiration.   2. Pt will tolerate soft solid trials to determine if /when appropriate for diet upgrade.  3. Pt will orient x4 with min cues, to improve functional memory.  4.  Pt will follow 2 step command with 90% accuracy, to improve receptive language skills.   5. Pt will respond to simple problem solving tasks with 70% accuracy, given mod cues, to improve functional problem solving skills.   6.  Pt will list 10 items in one minute on word fluency task, to improve thought organization skills.   7.  Further evaluate reading/writing/visual-spatial skills.         Goals due ton 6/12  1.Pt will participate in ongoing swallow assessment-met  2 pt will participate in sle -met    Pt remains somewhat confused but cooperative with ST session.  With po presentations this date, no overt s/s of aspiration observed.  When team ready to initiate diet, recommend  cautious diet of puree with thin liquids.  Sit pt upright as much as possible.  Encourage SMALL / SINGLE sips.  Crush meds in puree bolus.  Feel pt would benefit from assistance/supervision during intake upon initiation of diet.  Monitor for s/s of aspiration.  Full session note to follow.     PABLO Rojo, CCC-SLP  Speech Language Pathologist  [phone]  6/6/2018

## 2018-06-06 NOTE — PHYSICIAN QUERY
PT Name: Olimpia Cardoza  MR #: [id number]     Physician Query Form - Medication-Correlation for Diagnosis      CDS/: Karen Houston               Contact information: [phone]    This form is a permanent document in the medical record.     Query Date: June 6, 2018      By submitting this query, we are merely seeking further clarification of documentation.  Please utilize your independent clinical judgment when addressing the question(s) below.    The medical record contains the following:  The patient has an order for the following medication(s):  Magnesium 2 Gm IV PB X 1 6/6    Magnesium 800 mg PO 6/6             Please provider the corresponding diagnosis or diagnoses that support(s) the use of the medication(s)   Physician Use Only        Hyponatremia

## 2018-06-06 NOTE — CONSULTS
[facility]  Physical Medicine & Rehab  Consult Note    Patient Name: Olimpia Cardoza  MRN: [id number]  Admission Date: 6/4/2018  Hospital Length of Stay: 2 days  Attending Physician: Favio Swartz MD     Inpatient consult to Physical Medicine & Rehabilitation  Consult performed by: Adrienne Streeter NP  Consult requested by:  Favio Swartz MD    Reason for Consult:  assess rehabilitation needs  Consults  Subjective:     Principal Problem: Acute Ischemic R MCA stroke    HPI: Olimpia Cardoza is a 67-year-old female with PMHx of HTN, HLP, DM & COPD.  Patient presented to [facility] ED with L sided weakness and R gaze preference after she was found on the floor by her .  CTH revealed no acute pathology.  A telemedicine consult was placed and completed.  tPA was recommended and administered.  She was also intubated for airway protection. She was noted to have rib fractures. [place] [facility] [place] for further evaluation and management.  Upon admission, CTA was negative for LVO.  MRI brain negative. MRI C-spine was too motion limited for proper exam.  C-collar in place. Per VN, imaging without any lesions, suspect event may have been seizure related rather than cerebrovascular in origin.  She was extubated on 6/5/18.  Hospital course complicated by continued neck pain and elevated kidney function (improved).  NCC and VN following.    Functional History: Patient lives in [facility] with 3 sons in a single story home with no steps to enter.  Prior to admission, (I) with ADLs and utilizes a RW.    DME: RW, WC, and shower chair.    Hospital Course: 6/5/18: No therapy evals 2/2 extreme neck pain without C-spine imaging.  6/6/18: Passed bedside swallow evaluation.  SLP recommending pureed diet and thin liquids. SLP diagnosis of Dysphagia and Cognitive-Linguistic Impairment.   OT/PT evaluations pending.       Past Medical History:   Diagnosis Date    CVA (cerebral vascular accident) 6/4/2018     Depression 2/24/2015    Diabetes mellitus     Emphysema 2/24/2015    Hx smoking; home oxygen 2 l for past 4 years    Encounter for blood transfusion     Hepatomegaly 2/24/2015    Found on Routine imaging    HTN (hypertension) 2/24/2015    Hyperlipidemia     Other specified hypothyroidism 6/5/2018    Sleep apnea 2/24/2015     Past Surgical History:   Procedure Laterality Date    CARDIAC CATHETERIZATION      CHOLECYSTECTOMY      dislocated hip      traction    HEMORRHOID SURGERY      HERNIA REPAIR      with mesh    NISSEN FUNDOPLICATION      THYROID SURGERY       Review of patient's allergies indicates:   Allergen Reactions    Codeine Palpitations       Scheduled Medications:    aspirin  81 mg Oral Daily    atorvastatin  40 mg Oral Daily    baclofen  5 mg Oral QID    busPIRone  10 mg Oral BID    calcium gluconate IVPB  3 g Intravenous Once    heparin (porcine)  5,000 Units Subcutaneous Q8H    levothyroxine  175 mcg Oral Daily    lidocaine  1 patch Transdermal Q24H    magnesium sulfate IVPB  2 g Intravenous Once    ramelteon  8 mg Oral QHS    senna-docusate 8.6-50 mg  1 tablet Oral BID    sodium chloride 0.9%  3 mL Intravenous Q8H       PRN Medications: acetaminophen, albuterol-ipratropium, dextrose 50%, glucagon (human recombinant), HYDROcodone-acetaminophen, insulin aspart U-100, magnesium oxide, magnesium oxide, ondansetron, potassium chloride 10%, potassium chloride 10%, potassium chloride 10%, potassium, sodium phosphates, potassium, sodium phosphates, potassium, sodium phosphates    Family History     Problem Relation (Age of Onset)    Coronary artery disease Father, Brother    Emphysema Mother    Lung cancer Mother    No Known Problems Sister, Sister, Sister, Son, Son, Son, Maternal Grandmother, Maternal Grandfather, Paternal Grandmother, Paternal Grandfather, Maternal Aunt, Maternal Uncle, Paternal Aunt, Paternal Uncle    SIDS Daughter    Stroke Brother        Social History Main  Topics    Smoking status: Former Smoker     Packs/day: 3.00     Years: 47.00     Quit date: 3/21/2012    Smokeless tobacco: Not on file    Alcohol use No    Drug use: No    Sexual activity: Not on file     Review of Systems   Constitutional: Negative for chills, fatigue and fever.   HENT: Positive for trouble swallowing. Negative for voice change.    Eyes: Negative for photophobia and visual disturbance.   Respiratory: Negative for cough, shortness of breath and wheezing.    Cardiovascular: Negative for chest pain and palpitations.   Gastrointestinal: Negative for abdominal distention, nausea and vomiting.   Genitourinary: Negative for difficulty urinating and flank pain.   Musculoskeletal: Positive for back pain, gait problem and neck pain. Negative for arthralgias.   Skin: Negative for color change and rash.   Neurological: Positive for speech difficulty. Negative for weakness, numbness and headaches.   Psychiatric/Behavioral: Positive for confusion. Negative for agitation.     Objective:     Vital Signs (Most Recent):  Temp: 99.5 °F (37.5 °C) (06/06/18 0701)  Pulse: 109 (06/06/18 1000)  Resp: (!) 22 (06/06/18 1000)  BP: (!) 141/60 (06/06/18 1000)  SpO2: 100 % (06/06/18 1000)    Vital Signs (24h Range):  Temp:  [98.8 °F (37.1 °C)-99.5 °F (37.5 °C)] 99.5 °F (37.5 °C)  Pulse:  [] 109  Resp:  [12-27] 22  SpO2:  [91 %-100 %] 100 %  BP: (111-162)/() 141/60     Body mass index is 31.32 kg/m².    Physical Exam   Constitutional: She is oriented to person, place, and time. She appears well-developed and well-nourished.   HENT:   Head: Normocephalic and atraumatic.   Eyes: EOM are normal. Pupils are equal, round, and reactive to light.   Neck:   c-collar in place   Cardiovascular: Normal rate and regular rhythm.    Pulmonary/Chest: No respiratory distress.   Abdominal: Soft. There is no tenderness.   Musculoskeletal: Normal range of motion. She exhibits no deformity.   Neurological: She is alert and  oriented to person, place, and time.   -  Mental Status:  Awake and alert.  Follows commands.    -  Speech and language:  + aphasia + dysarthria.  .  -  Motor:  RUE: 4+/5, 5/5 .  LUE: 4+/5, 5/5 .  RLE: 5/5, DF 5/5, PF 5/5.  LLE: 5/5, DF 5/5, PF 5/5.   -  Tone:  normal  -  Sensory:  Intact to light touch and pin prick.  Skin: Skin is warm and dry.   Psychiatric: She has a normal mood and affect. Her behavior is normal. Cognition and memory are impaired.   Vitals reviewed.        Diagnostic Results:   Labs: Reviewed  ECG: Reviewed  X-Ray: Reviewed  CT: Reviewed  MRI: Reviewed    Assessment/Plan:     Neck pain    -c-collar in place  -MRI C spine limited by motion         Left-sided weakness    Recommendations  -  PT and OT evaluate and treat  -  Monitor for shoulder pain and subluxation  · Arm trough or lapboard use while sitting  · Stretching shoulder depressors/internal rotators  · Avoid pulling on affected arm  -  Monitor for spasticity  · PT/OT  · Initiate stretching program  · Use splints/casting/bracing to help preserve ROM  -  Monitor for skin breakdown and pressure ulcers  · Early mobility   · Repositioning/weight shifting every 20-30 minutes when sitting  · Turn patient every 2 hours  · Proper mattress/overlay and chair cushioning   · Pressure relief/heel protector boots          Acute ischemic right MCA stroke    See hospital course for functional, cognitive/speech/language, and nutrition/swallow status.      Recommendations  -  Encourage mobility, OOB in chair at least 3 hours per day, and early ambulation as appropriate   -  PT/OT evaluate and treat  -  SLP speech and cognitive evaluate and treat  -  Monitor sleep disturbances and establish consistent sleep-wake cycle  -  Monitor for bowel and bladder dysfunction  -  Monitor for shoulder pain and subluxation  -  Monitor for spasticity  -  Monitor for and prevent skin breakdown and pressure ulcers  · Early mobility, repositioning/weight shifting  every 20-30 minutes when sitting, turn patient every 2 hours, proper mattress/overlay and chair cushioning, pressure relief/heel protector boots  -  DVT prophylaxis:  scds  -  Reviewed discharge options (IP rehab, SNF, HH therapy, and OP therapy)          Therapy evaluations pending. Will follow progress and discuss with rehab team for rehab recommendation.      Thank you for your consult.     Adrienne Streeter NP  Department of Physical Medicine & Rehab  [facility]

## 2018-06-06 NOTE — HOSPITAL COURSE
6/5/18: No PT eval 2/2 extreme neck pain without C-spine imaging. OT grooming SV.   6/6/18: Passed bedside swallow evaluation.  SLP recommending pureed diet and thin liquids. SLP diagnosis of Dysphagia and Cognitive-Linguistic Impairment. Evaluated by PT. Bed mobility Min-ModA.  Sit to stand Huma x 2 and no transfers 2/2 impulsivity and sudden bathroom needs.  Ambulated 3 ft fw/bwd Huma with posterior LOB.

## 2018-06-06 NOTE — PT/OT/SLP EVAL
"Speech Language Pathology Evaluation  Cognitive Communication    Patient Name:  Olimpia Cardoza   MRN:  [id number]  Admitting Diagnosis: R MCA CVA    Recommendations:     Recommendations:                General Recommendations:  Dysphagia therapy, Speech/language therapy and Cognitive-linguistic therapy  Diet recommendations:  Puree, Thin   Aspiration Precautions: 1 bite/sip at a time, Assistance with meals, Feed only when awake/alert, HOB upright as much as possible , Meds crushed in puree, Remain upright 30 minutes post meal, Small bites/sips and Strict aspiration precautions . Monitor closely for s/s of aspiration.  General Precautions: Standard, aspiration, fall  Communication strategies:  provide increased time to answer    History:     Past Medical History:   Diagnosis Date    CVA (cerebral vascular accident) 6/4/2018    Depression 2/24/2015    Diabetes mellitus     Emphysema 2/24/2015    Hx smoking; home oxygen 2 l for past 4 years    Encounter for blood transfusion     Hepatomegaly 2/24/2015    Found on Routine imaging    HTN (hypertension) 2/24/2015    Hyperlipidemia     Other specified hypothyroidism 6/5/2018    Sleep apnea 2/24/2015       Past Surgical History:   Procedure Laterality Date    CARDIAC CATHETERIZATION      CHOLECYSTECTOMY      dislocated hip      traction    HEMORRHOID SURGERY      HERNIA REPAIR      with mesh    NISSEN FUNDOPLICATION      THYROID SURGERY           Subjective     "that tastes so good" (pt stated of water trials)    Pain/Comfort:  · Pain Rating 1:  (no pain reported at rest upon entry.  Pt reported pain in rib with movement but did not give rating.  )  · Location - Orientation 1:  (rib)  · Pain Addressed 1: Distraction, Nurse notified, Pre-medicate for activity- pt had been given pain meds prior to ST arrival  · Pain Rating Post-Intervention 1:  (no change in pain.  )    Objective:   Cognitive Status:    Arousal/Alertness -awake/alert/cooperative  Attention -fair " "to good throughout session  Orientation -oriented to self and situation.  She was unable to state month, year, place when attempted.  Pt stated she was in [place]" and via y/n question indicated the type of place as a hotel.  Memory- Deficits noted.  Pt recalled general information with 75% accuracy.  She was only able to immediately repeat up to 3 digits and 1 out of 4 words when attempted.  Problem Solving -Deficits noted.  Pt frequently responded with "I don't know".  Answers to basic hypothetical problem solving questions were responded to with 25% accuracy, as pt frequently stated "I don't know".  She required binary choice to complete basic concrete categorization.       Receptive Language:   Comprehension:   Simple y/n questions 100% accuracy  Complex y/n questions 80% accuracy  Simple command - 100%; Two step commands - 75%; unable to follow presented three step command    Expressive Language:  Verbal:    Expressive language skills appear functional for expressing most basic wants/needs. Pt named common objects with 100% accuracy.  She listed 7 items in one minute on a word fluency task (when 15-20 is considered wnl)      Motor Speech:  No significant dysarthria noted.    Voice:   WFL    Visual-Spatial:  TBA    Reading:   TBA    Written Expression:   TBA    Treatment:   Pt seated upright as much as tolerated due to rib discomfort.  Po trials were presented for ongoing swallow assessment.  Cervical collar was in place.  Pt accepted tsp thin water x3 and straw sips (approximately 5 oz).  Cup sips were deferred secondary to cervical collar and positioning.  Pt also accepted puree boluses x5.  She did not desire cracker trails.  Oral phase was functional.  Initiation of the pharyngeal phase was not significantly delayed.  Laryngeal elevation appeared fair. No overt coughing/choking or significant voice change was detected post any trials.  Education provided to pt/family regarding ST role, ongoing swallow " assessment, s/s aspiration, precautions, recommendations, and plan of care.  Emphasis was placed on risk factors and need for close monitoring for s/s of aspiration post initiation of po diet.    Whiteboard was updated.   Results of session / recommendations / precautions were reviewed with nursing post session.    Assessment:   Olimpia Cardoza is a 67 y.o. female with an SLP diagnosis of Dysphagia and Cognitive-Linguistic Impairment.      Goals:    SLP Goals        Problem: SLP Goal    Goal Priority Disciplines Outcome   SLP Goal     SLP    Description:  Speech Language Pathology Goals  Goals expected to be met by 6/13  1. Pt will tolerate puree diet with thin liquids and no overt s/s of aspiration.   2. Pt will tolerate soft solid trials to determine if /when appropriate for diet upgrade.  3. Pt will orient x4 with min cues, to improve functional memory.  4.  Pt will follow 2 step command with 90% accuracy, to improve receptive language skills.   5. Pt will respond to simple problem solving tasks with 70% accuracy, given mod cues, to improve functional problem solving skills.   6.  Pt will list 10 items in one minute on word fluency task, to improve thought organization skills.   7.  Further evaluate reading/writing/visual-spatial skills.         Goals due ton 6/12  1.Pt will participate in ongoing swallow assessment-met  2 pt will participate in sle -met                    Plan:   · Patient to be seen:  5 x/week   · Plan of Care expires:     · Plan of Care reviewed with:  patient, family   · SLP Follow-Up:  Yes       Discharge recommendations:  Discharge Facility/Level Of Care Needs: nursing facility, skilled       Time Tracking:   SLP Treatment Date:   06/06/18  Speech Start Time:  0945  Speech Stop Time:  1015     Speech Total Time (min):  30 min    Billable Minutes: Eval 20  and Treatment Swallowing Dysfunction 10    PABLO Rojo, CCC-SLP  Speech Language Pathologist  [phone]  6/6/2018

## 2018-06-06 NOTE — PROGRESS NOTES
[facility]  Vascular Neurology  Comprehensive Stroke Center  Progress Note    Assessment/Plan:     Acute ischemic right MCA stroke    67 year old female with left sided weakness, gaze preference, and speech diffculties found down at home, intubated and sedated prior to transfer for potential thrombectomy after tpa treatment. Admit to NCC    After further discussion with patient and family on 06/06/18. Both denied unilateral weakness or gaze preference.     Antithrombotics for secondary stroke prevention: Antiplatelets: None: Hold all Antithrombotics x 24 hours after IV t-PA administration, start after tpa window is up    Statins for secondary stroke prevention and hyperlipidemia, if present:   Can hold or resume home med, LDL 51    Aggressive risk factor modification: HTN, DM, HLD     Rehab efforts: PT/OT/SLP to evaluate and treat when appropriate     Diagnostics ordered/pending: none    VTE prophylaxis: Heparin 5000 units SQ every 8 hours    BP parameters: Infarct: Post tPA, SBP <180            Neck pain    Wearing c collar   MRI neck significantly motion limited but no abnormality seen        Other specified hypothyroidism    Continue synthroid        COPD (chronic obstructive pulmonary disease)    Stating well on 2L NC        Left-sided weakness    Concern for stroke   Improved  Therapy recommending snf         Dyslipidemia    Takes fenofibrate at home  No statin on home meds  LDL 51        Obesity due to excess calories    Stroke risk factor  Consult nutrition for counseling when appropriate          Essential hypertension    Stroke risk factor  SBP < 180 post tpa         Diabetes    Stroke risk factor, SSI while inpatient  On metformin at home               6/5/18 - improvement overnight, extubated. No noted weakness or speech difficulties   6/6/18 discussed HPI with patient's son who stated that when he found his mother down, she had BL weakness and was moving her eyes left and right.  Patient denies having unilateral weakness after falling.     STROKE DOCUMENTATION   Acute Stroke Times   Last Known Normal Date: 06/04/18  Last Known Normal Time: 1700  Symptom Onset Date: 06/04/18  Symptom Onset Time: 1720  Stroke Team Called Date: 06/04/18  Stroke Team Arrival Date: 06/04/18  Stroke Team Arrival Time:  (arrived in ED at 8:07 pm, PA awaiting arrival)  CT Interpretation Time:  (initial ct read by phoebe SYKES )    NIH Scale:  1a. Level Of Consciousness: 0-->Alert: keenly responsive  1b. LOC Questions: 0-->Answers both questions correctly  1c. LOC Commands: 0-->Performs both tasks correctly  2. Best Gaze: 0-->Normal  3. Visual: 0-->No visual loss  4. Facial Palsy: 0-->Normal symmetrical movements  5a. Motor Arm, Left: 0-->No drift: limb holds 90 (or 45) degrees for full 10 secs  5b. Motor Arm, Right: 0-->No drift: limb holds 90 (or 45) degrees for full 10 secs  6a. Motor Leg, Left: 0-->No drift: leg holds 30 degree position for full 5 secs  6b. Motor Leg, Right: 0-->No drift: leg holds 30 degree position for full 5 secs  7. Limb Ataxia: 0-->Absent  8. Sensory: 0-->Normal: no sensory loss  9. Best Language: 0-->No aphasia: normal  10. Dysarthria: 0-->Normal  11. Extinction and Inattention (formerly Neglect): 0-->No abnormality  Total (NIH Stroke Scale): 0       Modified [place] Score: 1  [place] Coma Scale:    ABCD2 Score:    [id number] Score:   HAS -BLED Score:   ICH Score:   Hunt & Kothari Classification:      Hemorrhagic change of an Ischemic Stroke: Does this patient have an ischemic stroke with hemorrhagic changes? No     Neurologic Chief Complaint: LOC    Subjective:     Interval History: Patient is seen for follow-up neurological assessment and treatment recommendations: MILAGROSTELLOON, complains of R thoracic pain from rib fractures, denies unilateral weakness    HPI, Past Medical, Family, and Social History remains the same as documented in the initial encounter.     Review of Systems   Constitutional:  Negative for chills and fever.   Respiratory: Negative for cough.    Cardiovascular:        R rib pain     Gastrointestinal: Negative for nausea and vomiting.   Neurological: Negative for facial asymmetry, speech difficulty, weakness and numbness.     Scheduled Meds:   aspirin  81 mg Oral Daily    atorvastatin  40 mg Oral Daily    baclofen  5 mg Oral QID    busPIRone  10 mg Oral BID    calcium gluconate IVPB  3 g Intravenous Once    heparin (porcine)  5,000 Units Subcutaneous Q8H    levothyroxine  175 mcg Oral Daily    lidocaine  1 patch Transdermal Q24H    magnesium sulfate IVPB  2 g Intravenous Once    ramelteon  8 mg Oral QHS    senna-docusate 8.6-50 mg  1 tablet Oral BID    sodium chloride 0.9%  3 mL Intravenous Q8H     Continuous Infusions:   sodium chloride 0.9% 100 mL/hr at 06/06/18 1328     PRN Meds:acetaminophen, albuterol-ipratropium, dextrose 50%, glucagon (human recombinant), HYDROcodone-acetaminophen, insulin aspart U-100, magnesium oxide, magnesium oxide, ondansetron, potassium chloride 10%, potassium chloride 10%, potassium chloride 10%, potassium, sodium phosphates, potassium, sodium phosphates, potassium, sodium phosphates    Objective:     Vital Signs (Most Recent):  Temp: 99.5 °F (37.5 °C) (06/06/18 0701)  Pulse: 109 (06/06/18 1000)  Resp: (!) 22 (06/06/18 1000)  BP: (!) 141/60 (06/06/18 1000)  SpO2: 100 % (06/06/18 1000)  BP Location: Right arm    Vital Signs Range (Last 24H):  Temp:  [98.8 °F (37.1 °C)-99.5 °F (37.5 °C)]   Pulse:  []   Resp:  [12-27]   BP: (111-162)/()   SpO2:  [93 %-100 %]   BP Location: Right arm    Physical Exam   Constitutional: She is oriented to person, place, and time. She appears well-developed and well-nourished. No distress.   HENT:   Head: Normocephalic and atraumatic.   Eyes: EOM are normal. Pupils are equal, round, and reactive to light.   Cardiovascular: Tachycardia present.    Pulmonary/Chest: Effort normal. No respiratory distress.    Neurological: She is alert and oriented to person, place, and time.   Skin: Skin is warm and dry.   Vitals reviewed.      Neurological Exam:   LOC: alert  Attention Span: Good   Language: No aphasia  Articulation: No dysarthria  Orientation: Person, Place, Time   Visual Fields: Full  EOM (CN III, IV, VI): Full/intact  Pupils (CN II, III): PERRL  Facial Sensation (CN V): Normal  Facial Movement (CN VII): Symmetric facial expression    Motor: Arm left  Normal 5/5  Leg left  Normal 5/5  Arm right  Normal 5/5  Leg right Normal 5/5  Cebellar: No evidence of appendicular or axial ataxia  Sensation: Intact to light touch, temperature and vibration  Tone: Normal tone throughout    Laboratory:  CMP:   Recent Labs  Lab 06/06/18 0425   CALCIUM 6.8*   ALBUMIN 2.6*   PROT 6.3      K 5.0   CO2 18*      BUN 29*   CREATININE 1.3   ALKPHOS 91   ALT 12   AST 12   BILITOT 0.3     CBC:   Recent Labs  Lab 06/06/18 0425   WBC 8.00   RBC 3.13*   HGB 8.5*   HCT 28.0*      MCV 90   MCH 27.2   MCHC 30.4*     Lipid Panel:   Recent Labs  Lab 06/04/18 2049   CHOL 109*   LDLCALC 51.8*   HDL 37*   TRIG 101     Coagulation:   Recent Labs  Lab 06/04/18 2049   INR 1.1   APTT <21.0     Platelet Aggregation Study: No results for input(s): PLTAGG, PLTAGINTERP, PLTAGREGLACO, ADPPLTAGGREG in the last 168 hours.  Hgb A1C:   Recent Labs  Lab 06/04/18 2049   HGBA1C 6.1*     TSH:   Recent Labs  Lab 06/04/18 2049   TSH <0.010*       Diagnostic Results     Brain Imaging   MRI Brain. Date: 06/05/18  Noncontrast MRI of the brain demonstrates no acute hemorrhage or infarct.    Mild chronic microvascular ischemic changes in the supratentorial white matter.    Vessel Imaging   CTA Multiphase. Date: 06/05/18  Possible right frontal area of hypoattenuation with evaluation limited by motion artifact.  Additional left centrum semiovale area of hypoattenuation possibly a recent or remote infarction.    Mild luminal irregularity of the right  proximal MCA as seen on series 5, image 1442.  No evidence of large vessel occlusion in the intracranial circulation.    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  The overall evaluation is difficult given motion within this region.  Outpatient repeat CTA of the head is suggested for further evaluation.    No hemodynamically significant stenosis in the neck vessels.    Cardiac Imaging   Echo. Date: 06/06/18  CONCLUSIONS     1 - Normal left ventricular systolic function (EF 65-70%).     2 - Indeterminate LV diastolic function.     3 - Normal right ventricular systolic function .     4 - Pulmonary hypertension. The estimated PA systolic pressure is 51 mmHg.     5 - Trivial tricuspid regurgitation.     6 - Moderate left atrial enlargement.     7 - Concentric remodeling.     8 - No wall motion abnormalities.             Michaela Paredes PA-C  Comprehensive Stroke Center  Department of Vascular Neurology   [facility]

## 2018-06-06 NOTE — PLAN OF CARE
SW completed LOCET via phone and faxed the PASRR to the state.    Lorri Stratton LMSW  Neurocritical Care   [facility]  [id number]

## 2018-06-06 NOTE — PLAN OF CARE
Problem: Physical Therapy Goal  Goal: Physical Therapy Goal  Outcome: Ongoing (interventions implemented as appropriate)  Initial eval completed.  Results, POC, and therapy recommendations discussed with patient and 2 sons.  Complete evaluation documentation to follow.     Pt transfers with min assistance, but impulsive with decreased safety awareness. Recommend 2 person assist during transfers d/t impulsivity.     Gabby Chappell, PT  6/6/2018  [phone] (pager)

## 2018-06-06 NOTE — PLAN OF CARE
Problem: Patient Care Overview  Goal: Plan of Care Review  Outcome: Ongoing (interventions implemented as appropriate)  No acute events occurred throughout shift.  Pts diet advanced to purreed with thin liquids. Remains on 100 mL/hr NS. Pt requiring bladder scan Q6 with intermittent straight catheter r/t retention. Transfer orders in for patient to be stepped down today. POC reviewed with Olimpia Cardoza and Olimpia Cardoza's sons at 1500.  Addressed all questions and concerns.  Pt progressing toward goals as noted. See flowsheets for full list of VS and assessments. Will continue to monitor.

## 2018-06-06 NOTE — ASSESSMENT & PLAN NOTE
67 year old female with left sided weakness, gaze preference, and speech diffculties found down at home, intubated and sedated prior to transfer for potential thrombectomy after tpa treatment. Admit to NCC    After further discussion with patient and family on 06/06/18. Both denied unilateral weakness or gaze preference.     Antithrombotics for secondary stroke prevention: Antiplatelets: None: Hold all Antithrombotics x 24 hours after IV t-PA administration, start after tpa window is up    Statins for secondary stroke prevention and hyperlipidemia, if present:   Can hold or resume home med, LDL 51    Aggressive risk factor modification: HTN, DM, HLD     Rehab efforts: PT/OT/SLP to evaluate and treat when appropriate     Diagnostics ordered/pending: none    VTE prophylaxis: Heparin 5000 units SQ every 8 hours    BP parameters: Infarct: Post tPA, SBP <180

## 2018-06-06 NOTE — NURSING
Pts bladder scan volume 487 mL. XAVI Lilly with NCC ordered to straight cath pt and rescan in 6 hours. Will continue to monitor.

## 2018-06-06 NOTE — PLAN OF CARE
SW spoke with Pt and Pt sons regarding list as PT reported to this SW they lost the list given yesterday. SW gave new list and reported Pt is now Short SNF recs. They will review list with Pt and let SW know of choices asap.    Lorri Stratton, KLEBER  Neurocritical Care   [facility]  [id number]

## 2018-06-06 NOTE — NURSING
Pts bladder scan volume 570 mL at 0745. DEION Tanner notified and ordered to straight cath pt and rescan in 6 hours. Will continue to monitor.

## 2018-06-06 NOTE — SUBJECTIVE & OBJECTIVE
Neurologic Chief Complaint: LOC    Subjective:     Interval History: Patient is seen for follow-up neurological assessment and treatment recommendations: YESENIA, complains of R thoracic pain from rib fractures, denies unilateral weakness    HPI, Past Medical, Family, and Social History remains the same as documented in the initial encounter.     Review of Systems   Constitutional: Negative for chills and fever.   Respiratory: Negative for cough.    Cardiovascular:        R rib pain     Gastrointestinal: Negative for nausea and vomiting.   Neurological: Negative for facial asymmetry, speech difficulty, weakness and numbness.     Scheduled Meds:   aspirin  81 mg Oral Daily    atorvastatin  40 mg Oral Daily    baclofen  5 mg Oral QID    busPIRone  10 mg Oral BID    calcium gluconate IVPB  3 g Intravenous Once    heparin (porcine)  5,000 Units Subcutaneous Q8H    levothyroxine  175 mcg Oral Daily    lidocaine  1 patch Transdermal Q24H    magnesium sulfate IVPB  2 g Intravenous Once    ramelteon  8 mg Oral QHS    senna-docusate 8.6-50 mg  1 tablet Oral BID    sodium chloride 0.9%  3 mL Intravenous Q8H     Continuous Infusions:   sodium chloride 0.9% 100 mL/hr at 06/06/18 1328     PRN Meds:acetaminophen, albuterol-ipratropium, dextrose 50%, glucagon (human recombinant), HYDROcodone-acetaminophen, insulin aspart U-100, magnesium oxide, magnesium oxide, ondansetron, potassium chloride 10%, potassium chloride 10%, potassium chloride 10%, potassium, sodium phosphates, potassium, sodium phosphates, potassium, sodium phosphates    Objective:     Vital Signs (Most Recent):  Temp: 99.5 °F (37.5 °C) (06/06/18 0701)  Pulse: 109 (06/06/18 1000)  Resp: (!) 22 (06/06/18 1000)  BP: (!) 141/60 (06/06/18 1000)  SpO2: 100 % (06/06/18 1000)  BP Location: Right arm    Vital Signs Range (Last 24H):  Temp:  [98.8 °F (37.1 °C)-99.5 °F (37.5 °C)]   Pulse:  []   Resp:  [12-27]   BP: (111-162)/()   SpO2:  [93 %-100 %]   BP  Location: Right arm    Physical Exam   Constitutional: She is oriented to person, place, and time. She appears well-developed and well-nourished. No distress.   HENT:   Head: Normocephalic and atraumatic.   Eyes: EOM are normal. Pupils are equal, round, and reactive to light.   Cardiovascular: Tachycardia present.    Pulmonary/Chest: Effort normal. No respiratory distress.   Neurological: She is alert and oriented to person, place, and time.   Skin: Skin is warm and dry.   Vitals reviewed.      Neurological Exam:   LOC: alert  Attention Span: Good   Language: No aphasia  Articulation: No dysarthria  Orientation: Person, Place, Time   Visual Fields: Full  EOM (CN III, IV, VI): Full/intact  Pupils (CN II, III): PERRL  Facial Sensation (CN V): Normal  Facial Movement (CN VII): Symmetric facial expression    Motor: Arm left  Normal 5/5  Leg left  Normal 5/5  Arm right  Normal 5/5  Leg right Normal 5/5  Cebellar: No evidence of appendicular or axial ataxia  Sensation: Intact to light touch, temperature and vibration  Tone: Normal tone throughout    Laboratory:  CMP:   Recent Labs  Lab 06/06/18 0425   CALCIUM 6.8*   ALBUMIN 2.6*   PROT 6.3      K 5.0   CO2 18*      BUN 29*   CREATININE 1.3   ALKPHOS 91   ALT 12   AST 12   BILITOT 0.3     CBC:   Recent Labs  Lab 06/06/18 0425   WBC 8.00   RBC 3.13*   HGB 8.5*   HCT 28.0*      MCV 90   MCH 27.2   MCHC 30.4*     Lipid Panel:   Recent Labs  Lab 06/04/18 2049   CHOL 109*   LDLCALC 51.8*   HDL 37*   TRIG 101     Coagulation:   Recent Labs  Lab 06/04/18 2049   INR 1.1   APTT <21.0     Platelet Aggregation Study: No results for input(s): PLTAGG, PLTAGINTERP, PLTAGREGLACO, ADPPLTAGGREG in the last 168 hours.  Hgb A1C:   Recent Labs  Lab 06/04/18 2049   HGBA1C 6.1*     TSH:   Recent Labs  Lab 06/04/18 2049   TSH <0.010*       Diagnostic Results     Brain Imaging   MRI Brain. Date: 06/05/18  Noncontrast MRI of the brain demonstrates no acute hemorrhage or  infarct.    Mild chronic microvascular ischemic changes in the supratentorial white matter.    Vessel Imaging   CTA Multiphase. Date: 06/05/18  Possible right frontal area of hypoattenuation with evaluation limited by motion artifact.  Additional left centrum semiovale area of hypoattenuation possibly a recent or remote infarction.    Mild luminal irregularity of the right proximal MCA as seen on series 5, image 1442.  No evidence of large vessel occlusion in the intracranial circulation.    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  The overall evaluation is difficult given motion within this region.  Outpatient repeat CTA of the head is suggested for further evaluation.    No hemodynamically significant stenosis in the neck vessels.    Cardiac Imaging   Echo. Date: 06/06/18  CONCLUSIONS     1 - Normal left ventricular systolic function (EF 65-70%).     2 - Indeterminate LV diastolic function.     3 - Normal right ventricular systolic function .     4 - Pulmonary hypertension. The estimated PA systolic pressure is 51 mmHg.     5 - Trivial tricuspid regurgitation.     6 - Moderate left atrial enlargement.     7 - Concentric remodeling.     8 - No wall motion abnormalities.

## 2018-06-06 NOTE — SUBJECTIVE & OBJECTIVE
Past Medical History:   Diagnosis Date    CVA (cerebral vascular accident) 6/4/2018    Depression 2/24/2015    Diabetes mellitus     Emphysema 2/24/2015    Hx smoking; home oxygen 2 l for past 4 years    Encounter for blood transfusion     Hepatomegaly 2/24/2015    Found on Routine imaging    HTN (hypertension) 2/24/2015    Hyperlipidemia     Other specified hypothyroidism 6/5/2018    Sleep apnea 2/24/2015     Past Surgical History:   Procedure Laterality Date    CARDIAC CATHETERIZATION      CHOLECYSTECTOMY      dislocated hip      traction    HEMORRHOID SURGERY      HERNIA REPAIR      with mesh    NISSEN FUNDOPLICATION      THYROID SURGERY       Review of patient's allergies indicates:   Allergen Reactions    Codeine Palpitations       Scheduled Medications:    aspirin  81 mg Oral Daily    atorvastatin  40 mg Oral Daily    baclofen  5 mg Oral QID    busPIRone  10 mg Oral BID    calcium gluconate IVPB  3 g Intravenous Once    heparin (porcine)  5,000 Units Subcutaneous Q8H    levothyroxine  175 mcg Oral Daily    lidocaine  1 patch Transdermal Q24H    magnesium sulfate IVPB  2 g Intravenous Once    ramelteon  8 mg Oral QHS    senna-docusate 8.6-50 mg  1 tablet Oral BID    sodium chloride 0.9%  3 mL Intravenous Q8H       PRN Medications: acetaminophen, albuterol-ipratropium, dextrose 50%, glucagon (human recombinant), HYDROcodone-acetaminophen, insulin aspart U-100, magnesium oxide, magnesium oxide, ondansetron, potassium chloride 10%, potassium chloride 10%, potassium chloride 10%, potassium, sodium phosphates, potassium, sodium phosphates, potassium, sodium phosphates    Family History     Problem Relation (Age of Onset)    Coronary artery disease Father, Brother    Emphysema Mother    Lung cancer Mother    No Known Problems Sister, Sister, Sister, Son, Son, Son, Maternal Grandmother, Maternal Grandfather, Paternal Grandmother, Paternal Grandfather, Maternal Aunt, Maternal Uncle,  Paternal Aunt, Paternal Uncle    SIDS Daughter    Stroke Brother        Social History Main Topics    Smoking status: Former Smoker     Packs/day: 3.00     Years: 47.00     Quit date: 3/21/2012    Smokeless tobacco: Not on file    Alcohol use No    Drug use: No    Sexual activity: Not on file     Review of Systems   Constitutional: Negative for chills, fatigue and fever.   HENT: Positive for trouble swallowing. Negative for voice change.    Eyes: Negative for photophobia and visual disturbance.   Respiratory: Negative for cough, shortness of breath and wheezing.    Cardiovascular: Negative for chest pain and palpitations.   Gastrointestinal: Negative for abdominal distention, nausea and vomiting.   Genitourinary: Negative for difficulty urinating and flank pain.   Musculoskeletal: Positive for back pain, gait problem and neck pain. Negative for arthralgias.   Skin: Negative for color change and rash.   Neurological: Positive for speech difficulty. Negative for weakness, numbness and headaches.   Psychiatric/Behavioral: Positive for confusion. Negative for agitation.     Objective:     Vital Signs (Most Recent):  Temp: 99.5 °F (37.5 °C) (06/06/18 0701)  Pulse: 109 (06/06/18 1000)  Resp: (!) 22 (06/06/18 1000)  BP: (!) 141/60 (06/06/18 1000)  SpO2: 100 % (06/06/18 1000)    Vital Signs (24h Range):  Temp:  [98.8 °F (37.1 °C)-99.5 °F (37.5 °C)] 99.5 °F (37.5 °C)  Pulse:  [] 109  Resp:  [12-27] 22  SpO2:  [91 %-100 %] 100 %  BP: (111-162)/() 141/60     Body mass index is 31.32 kg/m².    Physical Exam   Constitutional: She is oriented to person, place, and time. She appears well-developed and well-nourished.   HENT:   Head: Normocephalic and atraumatic.   Eyes: EOM are normal. Pupils are equal, round, and reactive to light.   Neck:   c-collar in place   Cardiovascular: Normal rate and regular rhythm.    Pulmonary/Chest: No respiratory distress.   Abdominal: Soft. There is no tenderness.   Musculoskeletal:  Normal range of motion. She exhibits no deformity.   Neurological: She is alert and oriented to person, place, and time.   -  Mental Status:  Awake and alert.  Follows commands.    -  Speech and language:  + aphasia + dysarthria.  .  -  Motor:  RUE: 4+/5, 5/5 .  LUE: 4+/5, 5/5 .  RLE: 5/5, DF 5/5, PF 5/5.  LLE: 5/5, DF 5/5, PF 5/5.   -  Tone:  normal  -  Sensory:  Intact to light touch and pin prick.     Skin: Skin is warm and dry.   Psychiatric: She has a normal mood and affect. Her behavior is normal. Cognition and memory are impaired.   Vitals reviewed.    NEUROLOGICAL EXAMINATION:     MENTAL STATUS   Oriented to person, place, and time.     CRANIAL NERVES     CN III, IV, VI   Pupils are equal, round, and reactive to light.  Extraocular motions are normal.       Diagnostic Results:   Labs: Reviewed  ECG: Reviewed  X-Ray: Reviewed  CT: Reviewed  MRI: Reviewed

## 2018-06-06 NOTE — PROGRESS NOTES
Pt now confused and disoriented to time, place, and situation. Pt disconnected herself from EKG leads as well as pulled out both IV's. Pt still follows commands and still oriented to person. NCC aware. Will continue to monitor.

## 2018-06-07 PROBLEM — I48.91 A-FIB: Status: ACTIVE | Noted: 2018-06-07

## 2018-06-07 PROBLEM — I63.411 EMBOLIC STROKE INVOLVING RIGHT MIDDLE CEREBRAL ARTERY: Status: ACTIVE | Noted: 2018-06-04

## 2018-06-07 LAB
ALBUMIN SERPL BCP-MCNC: 2.4 G/DL
ALP SERPL-CCNC: 105 U/L
ALT SERPL W/O P-5'-P-CCNC: 13 U/L
ANION GAP SERPL CALC-SCNC: 11 MMOL/L
AST SERPL-CCNC: 14 U/L
BASOPHILS # BLD AUTO: 0.02 K/UL
BASOPHILS NFR BLD: 0.2 %
BILIRUB SERPL-MCNC: 0.4 MG/DL
BUN SERPL-MCNC: 16 MG/DL
CALCIUM SERPL-MCNC: 7.6 MG/DL
CHLORIDE SERPL-SCNC: 111 MMOL/L
CO2 SERPL-SCNC: 20 MMOL/L
CREAT SERPL-MCNC: 1 MG/DL
DIFFERENTIAL METHOD: ABNORMAL
EOSINOPHIL # BLD AUTO: 0.1 K/UL
EOSINOPHIL NFR BLD: 0.9 %
ERYTHROCYTE [DISTWIDTH] IN BLOOD BY AUTOMATED COUNT: 15.3 %
EST. GFR  (AFRICAN AMERICAN): >60 ML/MIN/1.73 M^2
EST. GFR  (NON AFRICAN AMERICAN): 58 ML/MIN/1.73 M^2
GLUCOSE SERPL-MCNC: 130 MG/DL
HCT VFR BLD AUTO: 28.1 %
HGB BLD-MCNC: 8.9 G/DL
IMM GRANULOCYTES # BLD AUTO: 0.07 K/UL
IMM GRANULOCYTES NFR BLD AUTO: 0.9 %
LYMPHOCYTES # BLD AUTO: 1.2 K/UL
LYMPHOCYTES NFR BLD: 15.3 %
MAGNESIUM SERPL-MCNC: 1.8 MG/DL
MCH RBC QN AUTO: 27.9 PG
MCHC RBC AUTO-ENTMCNC: 31.7 G/DL
MCV RBC AUTO: 88 FL
MONOCYTES # BLD AUTO: 0.7 K/UL
MONOCYTES NFR BLD: 9 %
NEUTROPHILS # BLD AUTO: 6 K/UL
NEUTROPHILS NFR BLD: 73.7 %
NRBC BLD-RTO: 0 /100 WBC
PHOSPHATE SERPL-MCNC: 2.4 MG/DL
PLATELET # BLD AUTO: 238 K/UL
PMV BLD AUTO: 10.3 FL
POCT GLUCOSE: 120 MG/DL (ref 70–110)
POCT GLUCOSE: 125 MG/DL (ref 70–110)
POCT GLUCOSE: 138 MG/DL (ref 70–110)
POCT GLUCOSE: 141 MG/DL (ref 70–110)
POTASSIUM SERPL-SCNC: 4.9 MMOL/L
PROT SERPL-MCNC: 6.1 G/DL
RBC # BLD AUTO: 3.19 M/UL
SODIUM SERPL-SCNC: 142 MMOL/L
WBC # BLD AUTO: 8.13 K/UL

## 2018-06-07 PROCEDURE — 20600001 HC STEP DOWN PRIVATE ROOM

## 2018-06-07 PROCEDURE — 27000221 HC OXYGEN, UP TO 24 HOURS

## 2018-06-07 PROCEDURE — 97803 MED NUTRITION INDIV SUBSEQ: CPT

## 2018-06-07 PROCEDURE — 25000003 PHARM REV CODE 250: Performed by: NURSE PRACTITIONER

## 2018-06-07 PROCEDURE — 92526 ORAL FUNCTION THERAPY: CPT

## 2018-06-07 PROCEDURE — 36415 COLL VENOUS BLD VENIPUNCTURE: CPT

## 2018-06-07 PROCEDURE — 84100 ASSAY OF PHOSPHORUS: CPT

## 2018-06-07 PROCEDURE — 80053 COMPREHEN METABOLIC PANEL: CPT

## 2018-06-07 PROCEDURE — 99233 SBSQ HOSP IP/OBS HIGH 50: CPT | Mod: ,,, | Performed by: PSYCHIATRY & NEUROLOGY

## 2018-06-07 PROCEDURE — 92507 TX SP LANG VOICE COMM INDIV: CPT

## 2018-06-07 PROCEDURE — 97535 SELF CARE MNGMENT TRAINING: CPT

## 2018-06-07 PROCEDURE — 63600175 PHARM REV CODE 636 W HCPCS: Performed by: NURSE PRACTITIONER

## 2018-06-07 PROCEDURE — 25000003 PHARM REV CODE 250: Performed by: STUDENT IN AN ORGANIZED HEALTH CARE EDUCATION/TRAINING PROGRAM

## 2018-06-07 PROCEDURE — 94640 AIRWAY INHALATION TREATMENT: CPT

## 2018-06-07 PROCEDURE — 83735 ASSAY OF MAGNESIUM: CPT

## 2018-06-07 PROCEDURE — 94761 N-INVAS EAR/PLS OXIMETRY MLT: CPT

## 2018-06-07 PROCEDURE — A4216 STERILE WATER/SALINE, 10 ML: HCPCS | Performed by: NURSE PRACTITIONER

## 2018-06-07 PROCEDURE — 99232 SBSQ HOSP IP/OBS MODERATE 35: CPT | Mod: ,,, | Performed by: NURSE PRACTITIONER

## 2018-06-07 PROCEDURE — 97110 THERAPEUTIC EXERCISES: CPT

## 2018-06-07 PROCEDURE — 25000242 PHARM REV CODE 250 ALT 637 W/ HCPCS: Performed by: STUDENT IN AN ORGANIZED HEALTH CARE EDUCATION/TRAINING PROGRAM

## 2018-06-07 PROCEDURE — 85025 COMPLETE CBC W/AUTO DIFF WBC: CPT

## 2018-06-07 PROCEDURE — 25000003 PHARM REV CODE 250: Performed by: HOSPITALIST

## 2018-06-07 PROCEDURE — 25000003 PHARM REV CODE 250: Performed by: PSYCHIATRY & NEUROLOGY

## 2018-06-07 RX ORDER — GLUCAGON 1 MG
1 KIT INJECTION
Status: DISCONTINUED | OUTPATIENT
Start: 2018-06-07 | End: 2018-06-09 | Stop reason: HOSPADM

## 2018-06-07 RX ORDER — IBUPROFEN 200 MG
24 TABLET ORAL
Status: DISCONTINUED | OUTPATIENT
Start: 2018-06-07 | End: 2018-06-09 | Stop reason: HOSPADM

## 2018-06-07 RX ORDER — AMOXICILLIN 250 MG
2 CAPSULE ORAL 2 TIMES DAILY
Status: DISCONTINUED | OUTPATIENT
Start: 2018-06-07 | End: 2018-06-09 | Stop reason: HOSPADM

## 2018-06-07 RX ORDER — AMITRIPTYLINE HYDROCHLORIDE 50 MG/1
50 TABLET, FILM COATED ORAL NIGHTLY
Status: DISCONTINUED | OUTPATIENT
Start: 2018-06-07 | End: 2018-06-09 | Stop reason: HOSPADM

## 2018-06-07 RX ORDER — IPRATROPIUM BROMIDE AND ALBUTEROL SULFATE 2.5; .5 MG/3ML; MG/3ML
3 SOLUTION RESPIRATORY (INHALATION) EVERY 8 HOURS
Status: DISCONTINUED | OUTPATIENT
Start: 2018-06-07 | End: 2018-06-09 | Stop reason: HOSPADM

## 2018-06-07 RX ORDER — INSULIN ASPART 100 [IU]/ML
1-10 INJECTION, SOLUTION INTRAVENOUS; SUBCUTANEOUS
Status: DISCONTINUED | OUTPATIENT
Start: 2018-06-07 | End: 2018-06-07

## 2018-06-07 RX ORDER — METOPROLOL TARTRATE 25 MG/1
25 TABLET, FILM COATED ORAL 2 TIMES DAILY
Status: DISCONTINUED | OUTPATIENT
Start: 2018-06-07 | End: 2018-06-08

## 2018-06-07 RX ORDER — CARVEDILOL 12.5 MG/1
12.5 TABLET ORAL 2 TIMES DAILY
Status: DISCONTINUED | OUTPATIENT
Start: 2018-06-07 | End: 2018-06-07

## 2018-06-07 RX ORDER — ATORVASTATIN CALCIUM 20 MG/1
20 TABLET, FILM COATED ORAL DAILY
Status: DISCONTINUED | OUTPATIENT
Start: 2018-06-08 | End: 2018-06-09 | Stop reason: HOSPADM

## 2018-06-07 RX ORDER — IBUPROFEN 200 MG
16 TABLET ORAL
Status: DISCONTINUED | OUTPATIENT
Start: 2018-06-07 | End: 2018-06-07

## 2018-06-07 RX ADMIN — APIXABAN 5 MG: 5 TABLET, FILM COATED ORAL at 09:06

## 2018-06-07 RX ADMIN — BUSPIRONE HYDROCHLORIDE 10 MG: 10 TABLET ORAL at 08:06

## 2018-06-07 RX ADMIN — RAMELTEON 8 MG: 8 TABLET, FILM COATED ORAL at 09:06

## 2018-06-07 RX ADMIN — STANDARDIZED SENNA CONCENTRATE AND DOCUSATE SODIUM 2 TABLET: 8.6; 5 TABLET, FILM COATED ORAL at 09:06

## 2018-06-07 RX ADMIN — HYDROCODONE BITARTRATE AND ACETAMINOPHEN 1 TABLET: 5; 325 TABLET ORAL at 11:06

## 2018-06-07 RX ADMIN — Medication 3 ML: at 05:06

## 2018-06-07 RX ADMIN — BACLOFEN 5 MG: 10 TABLET ORAL at 02:06

## 2018-06-07 RX ADMIN — BACLOFEN 5 MG: 10 TABLET ORAL at 05:06

## 2018-06-07 RX ADMIN — HEPARIN SODIUM 5000 UNITS: 5000 INJECTION, SOLUTION INTRAVENOUS; SUBCUTANEOUS at 05:06

## 2018-06-07 RX ADMIN — IPRATROPIUM BROMIDE AND ALBUTEROL SULFATE 3 ML: .5; 3 SOLUTION RESPIRATORY (INHALATION) at 07:06

## 2018-06-07 RX ADMIN — HYDROCODONE BITARTRATE AND ACETAMINOPHEN 1 TABLET: 5; 325 TABLET ORAL at 05:06

## 2018-06-07 RX ADMIN — BUSPIRONE HYDROCHLORIDE 10 MG: 10 TABLET ORAL at 09:06

## 2018-06-07 RX ADMIN — LIDOCAINE 1 PATCH: 50 PATCH TOPICAL at 11:06

## 2018-06-07 RX ADMIN — BACLOFEN 5 MG: 10 TABLET ORAL at 09:06

## 2018-06-07 RX ADMIN — HYDROCODONE BITARTRATE AND ACETAMINOPHEN 1 TABLET: 5; 325 TABLET ORAL at 09:06

## 2018-06-07 RX ADMIN — ASPIRIN 81 MG CHEWABLE TABLET 81 MG: 81 TABLET CHEWABLE at 08:06

## 2018-06-07 RX ADMIN — Medication 3 ML: at 02:06

## 2018-06-07 RX ADMIN — LEVOTHYROXINE SODIUM 175 MCG: 150 TABLET ORAL at 05:06

## 2018-06-07 RX ADMIN — BACLOFEN 5 MG: 10 TABLET ORAL at 08:06

## 2018-06-07 RX ADMIN — STANDARDIZED SENNA CONCENTRATE AND DOCUSATE SODIUM 1 TABLET: 8.6; 5 TABLET, FILM COATED ORAL at 08:06

## 2018-06-07 RX ADMIN — METOPROLOL TARTRATE 25 MG: 25 TABLET ORAL at 09:06

## 2018-06-07 RX ADMIN — ATORVASTATIN CALCIUM 40 MG: 20 TABLET, FILM COATED ORAL at 08:06

## 2018-06-07 RX ADMIN — CARVEDILOL 12.5 MG: 12.5 TABLET, FILM COATED ORAL at 11:06

## 2018-06-07 RX ADMIN — AMITRIPTYLINE HYDROCHLORIDE 50 MG: 50 TABLET, FILM COATED ORAL at 09:06

## 2018-06-07 NOTE — PROGRESS NOTES
[facility]  Physical Medicine & Rehab  Progress Note    Patient Name: Olimpia Cardoza  MRN: [id number]  Admission Date: 6/4/2018  Length of Stay: 3 days  Attending Physician: Arnav Hutchins MD    Subjective:     Principal Problem: Acute ischemic R MCA stroke     Hospital Course:   6/5/18: No PT eval 2/2 extreme neck pain without C-spine imaging. OT grooming SV.   6/6/18: Passed bedside swallow evaluation.  SLP recommending pureed diet and thin liquids. SLP diagnosis of Dysphagia and Cognitive-Linguistic Impairment. Evaluated by PT. Bed mobility Min-ModA.  Sit to stand Huma x 2 and no transfers 2/2 impulsivity and sudden bathroom needs.  Ambulated 3 ft fw/bwd Huma with posterior LOB.        Interval History 6/7/2018:  Patient is seen for follow-up rehab evaluation and recommendations: Participating with therapy C-collar in place.  HPI, Past Medical, Family, and Social History remains the same as documented in the initial encounter.    Scheduled Medications:    albuterol-ipratropium  3 mL Nebulization Q8H    amitriptyline  50 mg Oral QHS    aspirin  81 mg Oral Daily    atorvastatin  40 mg Oral Daily    baclofen  5 mg Oral QID    busPIRone  10 mg Oral BID    carvedilol  12.5 mg Oral BID    heparin (porcine)  5,000 Units Subcutaneous Q8H    levothyroxine  175 mcg Oral Daily    lidocaine  1 patch Transdermal Q24H    ramelteon  8 mg Oral QHS    senna-docusate 8.6-50 mg  2 tablet Oral BID    sodium chloride 0.9%  3 mL Intravenous Q8H       Diagnostic Results: Labs: Reviewed    PRN Medications: acetaminophen, dextrose 50%, dextrose 50%, glucagon (human recombinant), glucose, HYDROcodone-acetaminophen, insulin aspart U-100, ondansetron    Review of Systems   Constitutional: Negative for chills, fatigue and fever.   HENT: Positive for trouble swallowing. Negative for voice change.    Eyes: Negative for photophobia and visual disturbance.   Respiratory: Negative for cough, shortness of breath  and wheezing.    Cardiovascular: Negative for chest pain and palpitations.   Gastrointestinal: Negative for abdominal distention, nausea and vomiting.   Genitourinary: Negative for difficulty urinating and flank pain.   Musculoskeletal: Positive for back pain, gait problem and neck pain. Negative for arthralgias.        Rib pain 2/2 fractures   Skin: Negative for color change and rash.   Neurological: Positive for speech difficulty. Negative for weakness, numbness and headaches.   Psychiatric/Behavioral: Positive for confusion. Negative for agitation.     Objective:     Vital Signs (Most Recent):  Temp: 98.2 °F (36.8 °C) (06/07/18 0846)  Pulse: (!) 127 (06/07/18 0846)  Resp: 16 (06/07/18 0846)  BP: (!) 167/79 (06/07/18 0846)  SpO2: 96 % (06/07/18 0846)    Vital Signs (24h Range):  Temp:  [98.2 °F (36.8 °C)-99 °F (37.2 °C)] 98.2 °F (36.8 °C)  Pulse:  [102-127] 127  Resp:  [11-35] 16  SpO2:  [93 %-100 %] 96 %  BP: (134-172)/(62-81) 167/79     Physical Exam   Constitutional: She is oriented to person, place, and time. She appears well-developed and well-nourished.   HENT:   Head: Normocephalic and atraumatic.   Eyes: EOM are normal. Pupils are equal, round, and reactive to light.   Neck:   c-collar in place   Cardiovascular: Normal rate and regular rhythm.    Pulmonary/Chest: No respiratory distress.   Abdominal: Soft. She exhibits distension. There is no tenderness.   Musculoskeletal: Normal range of motion. She exhibits no deformity.   Neurological: She is alert and oriented to person, place, and time.   -  Mental Status:  Awake and alert.  Follows commands.    -  Speech and language:  + aphasia + dysarthria.  .  -  Motor:  RUE: 4+/5, 5/5 .  LUE: 4+/5, 5/5 .  RLE: 5/5, DF 5/5, PF 5/5.  LLE: 5/5, DF 5/5, PF 5/5.   -  Tone:  normal  -  Sensory:  Intact to light touch and pin prick.  Skin: Skin is warm and dry.   Psychiatric: She has a normal mood and affect. Her behavior is normal. Cognition and memory are  impaired.   Vitals reviewed.      Assessment/Plan:      Neck pain    -c-collar in place 2/2 fall   -MRI C spine limited by motion         Left-sided weakness    Recommendations  -  PT and OT evaluate and treat  -  Monitor for shoulder pain and subluxation  · Arm trough or lapboard use while sitting  · Stretching shoulder depressors/internal rotators  · Avoid pulling on affected arm  -  Monitor for spasticity  · PT/OT  · Initiate stretching program  · Use splints/casting/bracing to help preserve ROM  -  Monitor for skin breakdown and pressure ulcers  · Early mobility   · Repositioning/weight shifting every 20-30 minutes when sitting  · Turn patient every 2 hours  · Proper mattress/overlay and chair cushioning   · Pressure relief/heel protector boots          Acute ischemic right MCA stroke    See hospital course for functional, cognitive/speech/language, and nutrition/swallow status.      Recommendations  -  Encourage mobility, OOB in chair at least 3 hours per day, and early ambulation as appropriate   -  PT/OT evaluate and treat  -  SLP speech and cognitive evaluate and treat  -  Monitor sleep disturbances and establish consistent sleep-wake cycle  -  Monitor for bowel and bladder dysfunction  -  Monitor for shoulder pain and subluxation  -  Monitor for spasticity  -  Monitor for and prevent skin breakdown and pressure ulcers  · Early mobility, repositioning/weight shifting every 20-30 minutes when sitting, turn patient every 2 hours, proper mattress/overlay and chair cushioning, pressure relief/heel protector boots  -  DVT prophylaxis:  scds  -  Reviewed discharge options (IP rehab, SNF, HH therapy, and OP therapy)          Recommend Skilled Nursing Rehab as she does want to participate in 3 hours of therapy/day.  SNF preference per patient/[facility].  Barriers to SNF admission:  Medical stability.       Adrienne Streeter NP  Department of Physical Medicine & Rehab   [facility]

## 2018-06-07 NOTE — NURSING TRANSFER
Nursing Transfer Note      6/6/2018     Transfer To: [id number]    Transfer via bed    Transfer with nasal cannula to O2, cardiac monitoring    Transported by RN and PCT    Medicines sent: none    Chart send with patient: Yes    Notified: family    Patient reassessed at: 2000, 6/6/2018 (date, time)    Upon arrival to floor: cardiac monitor applied, patient oriented to room, call bell in reach and bed in lowest position

## 2018-06-07 NOTE — SUBJECTIVE & OBJECTIVE
Interval History 6/7/2018:  Patient is seen for follow-up rehab evaluation and recommendations: Participating with therapy C-collar in place.  HPI, Past Medical, Family, and Social History remains the same as documented in the initial encounter.    Scheduled Medications:    albuterol-ipratropium  3 mL Nebulization Q8H    amitriptyline  50 mg Oral QHS    aspirin  81 mg Oral Daily    atorvastatin  40 mg Oral Daily    baclofen  5 mg Oral QID    busPIRone  10 mg Oral BID    carvedilol  12.5 mg Oral BID    heparin (porcine)  5,000 Units Subcutaneous Q8H    levothyroxine  175 mcg Oral Daily    lidocaine  1 patch Transdermal Q24H    ramelteon  8 mg Oral QHS    senna-docusate 8.6-50 mg  2 tablet Oral BID    sodium chloride 0.9%  3 mL Intravenous Q8H       Diagnostic Results: Labs: Reviewed    PRN Medications: acetaminophen, dextrose 50%, dextrose 50%, glucagon (human recombinant), glucose, HYDROcodone-acetaminophen, insulin aspart U-100, ondansetron    Review of Systems   Constitutional: Negative for chills, fatigue and fever.   HENT: Positive for trouble swallowing. Negative for voice change.    Eyes: Negative for photophobia and visual disturbance.   Respiratory: Negative for cough, shortness of breath and wheezing.    Cardiovascular: Negative for chest pain and palpitations.   Gastrointestinal: Negative for abdominal distention, nausea and vomiting.   Genitourinary: Negative for difficulty urinating and flank pain.   Musculoskeletal: Positive for back pain, gait problem and neck pain. Negative for arthralgias.        Rib pain 2/2 fractures   Skin: Negative for color change and rash.   Neurological: Positive for speech difficulty. Negative for weakness, numbness and headaches.   Psychiatric/Behavioral: Positive for confusion. Negative for agitation.     Objective:     Vital Signs (Most Recent):  Temp: 98.2 °F (36.8 °C) (06/07/18 0846)  Pulse: (!) 127 (06/07/18 0846)  Resp: 16 (06/07/18 0846)  BP: (!) 167/79  (06/07/18 0846)  SpO2: 96 % (06/07/18 0846)    Vital Signs (24h Range):  Temp:  [98.2 °F (36.8 °C)-99 °F (37.2 °C)] 98.2 °F (36.8 °C)  Pulse:  [102-127] 127  Resp:  [11-35] 16  SpO2:  [93 %-100 %] 96 %  BP: (134-172)/(62-81) 167/79     Physical Exam   Constitutional: She is oriented to person, place, and time. She appears well-developed and well-nourished.   HENT:   Head: Normocephalic and atraumatic.   Eyes: EOM are normal. Pupils are equal, round, and reactive to light.   Neck:   c-collar in place   Cardiovascular: Normal rate and regular rhythm.    Pulmonary/Chest: No respiratory distress.   Abdominal: Soft. She exhibits distension. There is no tenderness.   Musculoskeletal: Normal range of motion. She exhibits no deformity.   Neurological: She is alert and oriented to person, place, and time.   -  Mental Status:  Awake and alert.  Follows commands.    -  Speech and language:  + aphasia + dysarthria.  .  -  Motor:  RUE: 4+/5, 5/5 .  LUE: 4+/5, 5/5 .  RLE: 5/5, DF 5/5, PF 5/5.  LLE: 5/5, DF 5/5, PF 5/5.   -  Tone:  normal  -  Sensory:  Intact to light touch and pin prick.     Skin: Skin is warm and dry.   Psychiatric: She has a normal mood and affect. Her behavior is normal. Cognition and memory are impaired.   Vitals reviewed.    NEUROLOGICAL EXAMINATION:     MENTAL STATUS   Oriented to person, place, and time.     CRANIAL NERVES     CN III, IV, VI   Pupils are equal, round, and reactive to light.  Extraocular motions are normal.

## 2018-06-07 NOTE — PLAN OF CARE
SW sent referral for SNF to [facility], [facility], and [place] [facility] for review.     JOCY will follow up.    Samantha Molina LMSW  [facility]  Ext. [phone]

## 2018-06-07 NOTE — PLAN OF CARE
Problem: Stroke (Ischemic) (Adult)  Intervention: Monitor/Assist with Self Care  Sitting upright in chair. No neurological deficits noted. Decreased mobility due to rib pain. Discussed s/s of stroke and questions encouraged.

## 2018-06-07 NOTE — SUBJECTIVE & OBJECTIVE
Interval History:  >4 elements OR status of 3 inpatient conditions  No change on her exam. -MRI  No evidence of acute stroke. Only chronic microvascular disease. MRI C spine some C5-6 narrowing from disc osteophyte complex. .   Review of Systems   Constitutional: Negative.    HENT: Negative.    Eyes: Negative.    Respiratory: Negative.    Cardiovascular: Negative.    Gastrointestinal: Negative.    Endocrine: Negative.    Genitourinary: Negative.    Musculoskeletal: Positive for back pain.   Skin: Negative.    Allergic/Immunologic: Negative.      2 systems OR Unable to obtain a complete ROS due to level of consciousness.  Objective:     Vitals:  Temp: 98.5 °F (36.9 °C)  Pulse: (!) 112  Rhythm: sinus tachycardia  BP: (!) 172/77  MAP (mmHg): 111  Resp: 20  SpO2: (!) 93 %  O2 Device (Oxygen Therapy): nasal cannula    Temp  Min: 98.5 °F (36.9 °C)  Max: 99.5 °F (37.5 °C)  Pulse  Min: 99  Max: 115  BP  Min: 123/58  Max: 172/77  MAP (mmHg)  Min: 84  Max: 124  Resp  Min: 11  Max: 35  SpO2  Min: 93 %  Max: 100 %    06/05 0701 - 06/06 0700  In: 2255 [I.V.:2255]  Out: 1550 [Urine:1550]   Unmeasured Output  Stool Occurrence: 0       Physical Exam  Unable to test gait due to level of consciousness.    General   HEENT: C-collar.  Chest Heart RRR / Lungs Clear to auscultation  Abdomen: Distended abdomen.  Extremities: OK distal pulses.  Skin: UK  Neurological Exam:  MS; Alert, oriented , distressed because of pain.  CN: II-XII UK  Motor: LUE   5/5 / RUE 5/5  Tone normal bilaterally             LLE  5 /5 /  RLE  5/5  Tone normal bilaterally  Sensory: LT/PP/T/ Vibration UK                 Complex sensory modalities: not tested  DTR:  normal throughout.  Coordination /Fine motor: UK  Gait: Not tested.  Meningeal signs: Absent    Medications:  Continuous  sodium chloride 0.9% Last Rate: 100 mL/hr at 06/06/18 1800   Scheduled  aspirin 81 mg Daily   atorvastatin 40 mg Daily   baclofen 5 mg QID   busPIRone 10 mg BID   heparin (porcine)  5,000 Units Q8H   levothyroxine 175 mcg Daily   lidocaine 1 patch Q24H   ramelteon 8 mg QHS   senna-docusate 8.6-50 mg 1 tablet BID   sodium chloride 0.9% 3 mL Q8H   PRN  acetaminophen 650 mg Q6H PRN   albuterol-ipratropium 3 mL Q6H PRN   dextrose 50% 12.5 g PRN   glucagon (human recombinant) 1 mg PRN   HYDROcodone-acetaminophen 1 tablet Q6H PRN   insulin aspart U-100 1-10 Units Q6H PRN   magnesium oxide 800 mg PRN   magnesium oxide 800 mg PRN   ondansetron 8 mg Q8H PRN   potassium chloride 10% 40 mEq PRN   potassium chloride 10% 40 mEq PRN   potassium chloride 10% 60 mEq PRN   potassium, sodium phosphates 2 packet PRN   potassium, sodium phosphates 2 packet PRN   potassium, sodium phosphates 2 packet PRN     Today I personally reviewed pertinent medications, lines/drains/airways, imaging, cardiology, lab results, microbiology results, notably:    Diet  Diet Dysphagia Pureed (IDDSI Level 4) [facility]; Thin  Diet Dysphagia Pureed (IDDSI Level 4) [facility]; Thin  CMP:      Recent Labs  Lab 06/06/18 0425   CALCIUM 6.8*   ALBUMIN 2.6*   PROT 6.3      K 5.0   CO2 18*      BUN 29*   CREATININE 1.3   ALKPHOS 91   ALT 12   AST 12   BILITOT 0.3      BMP:      Recent Labs  Lab 06/06/18 0425      K 5.0      CO2 18*   BUN 29*   CREATININE 1.3   CALCIUM 6.8*      CBC:      Recent Labs  Lab 06/06/18 0425   WBC 8.00   RBC 3.13*   HGB 8.5*   HCT 28.0*      MCV 90   MCH 27.2   MCHC 30.4*      Lipid Panel:      Recent Labs  Lab 06/04/18 2049   CHOL 109*   LDLCALC 51.8*   HDL 37*   TRIG 101      Coagulation:      Recent Labs  Lab 06/04/18 2049   INR 1.1   APTT <21.0      Platelet Aggregation Study: No results for input(s): PLTAGG, PLTAGINTERP, PLTAGREGLACO, ADPPLTAGGREG in the last 168 hours.  Hgb A1C:      Recent Labs  Lab 06/04/18 2049   HGBA1C 6.1*      TSH:      Recent Labs  Lab 06/04/18 2049   TSH <0.010*      No results for input(s): PH, PCO2, PO2, HCO3, POCSATURATED, BE in the  last 24 hours.

## 2018-06-07 NOTE — PROGRESS NOTES
" [facility]  Adult Nutrition  Progress Note    SUMMARY       Recommendations    Recommendation/Intervention:   Continue Regular diet with texture changes per SLP recommendations. Pt declined Boost Glucose ONS at this time.     RD to monitor.    Goals: Pt will recieve nutrition with 48 hours  Nutrition Goal Status: goal met  Communication of RD Recs: reviewed with RN    Reason for Assessment    Reason for Assessment: RD follow-up  Diagnosis: stroke/CVA (acute ischemic right MCA stroke)  Relevant Medical History: DM, HTN, dyslipidemia, hepatomegaly, panlobular emphysema, small bowel obstruction  Interdisciplinary Rounds: did not attend  General Information Comments: Pt's diet advanced. Tolerating pureed and thin per SLP recommendations.  Nutrition Discharge Planning: adequate po intake for optimal nutrition    Nutrition Risk Screen    Nutrition Risk Screen: no indicators present    Nutrition/Diet History    Patient Reported Diet/Restrictions/Preferences: general  Typical Food/Fluid Intake: Pt consuming adequate amounts for lunch and dinner per pt and family. Did not consumed bkfst as pt typically does not eat in morning.  Food Preferences: no red meat  Do you have any cultural, spiritual, Zoroastrian conflicts, given your current situation?: none known  Factors Affecting Nutritional Intake: chewing difficulties/inability to chew food    Anthropometrics    Temp: 98.5 °F (36.9 °C)  Height Method: Stated  Height: 5' 1" (154.9 cm)  Height (inches): 61 in  Weight Method: Bed Scale  Weight: 75.2 kg (165 lb 12.6 oz)  Weight (lb): 165.79 lb  Ideal Body Weight (IBW), Female: 105 lb  % Ideal Body Weight, Female (lb): 157.9 lb  BMI (Calculated): 31.4  BMI Grade: 30 - 34.9- obesity - grade I       Lab/Procedures/Meds    Pertinent Labs Reviewed: reviewed  Pertinent Labs Comments: POCT Glu 120-137  Pertinent Medications Reviewed: reviewed  Pertinent Medications Comments: statin, IVF, insulin    Physical " Findings/Assessment    Overall Physical Appearance: obese, on oxygen therapy  Oral/Mouth Cavity: decay/carries, tooth/teeth missing  Skin: intact    Estimated/Assessed Needs    Weight Used For Calorie Calculations: 75.2 kg (165 lb 12.6 oz)  Energy Calorie Requirements (kcal): 1524 kcal  Energy Need Method: [place]-St Jeor (x 1.25)  Protein Requirements: 68-90 g/day (1.0-1.2 g/kg)  Weight Used For Protein Calculations: 75.2 kg (165 lb 12.6 oz)        RDA Method (mL): 1524   CHO requirements: 50% of total kcals      Nutrition Prescription Ordered    Current Diet Order: pureed, thin liquids    Evaluation of Received Nutrient/Fluid Intake    % Intake of Estimated Energy Needs: 75 - 100 %  % Meal Intake: 75 - 100 %    Nutrition Risk    Level of Risk/Frequency of Follow-up:  (f/u 1x/week)     Assessment and Plan       Nutrition Problem  Inadequate energy intake     Related to (etiology):   NPO     Signs and Symptoms (as evidenced by):   Meeting 0% of EEN/EPN     Interventions/Recommendations (treatment strategy):  Please see recs above     Nutrition Diagnosis Status:   Resolved    Monitor and Evaluation    Food and Nutrient Intake: energy intake, food and beverage intake  Food and Nutrient Adminstration: diet order  Anthropometric Measurements: weight, weight change, body mass index  Biochemical Data, Medical Tests and Procedures: electrolyte and renal panel, gastrointestinal profile, glucose/endocrine profile  Nutrition-Focused Physical Findings: overall appearance, skin     Nutrition Follow-Up    RD Follow-up?: Yes

## 2018-06-07 NOTE — ASSESSMENT & PLAN NOTE
67 year old female with left sided weakness, gaze preference, and speech diffculties found down at home, intubated and sedated prior to transfer for potential thrombectomy after tpa treatment.     - CTA: negative for LVO   - MRI: negative for diffusion restrictions   - Tele: Afib / RVR   - Echo: + LA enlargements     - Presumed cardio-embolic etiology     Antithrombotics for secondary stroke prevention: 6/7 apixaban 5 mg BID     Statins for secondary stroke prevention and hyperlipidemia, if present: Atorvastatin 40 mg OD    Aggressive risk factor modification: HTN, DM, HLD     Rehab efforts: PT/OT/SLP to evaluate and treat - Recommendations SNF      Diagnostics ordered/pending: none    VTE prophylaxis: Apixaban     BP parameters: Infarct: Post tPA, SBP <180

## 2018-06-07 NOTE — PROGRESS NOTES
Sitting up in chair. No neuro deficits noted. C/o pain to right thoracic/rib area. Will give pain medication at appropriate time. States understanding.

## 2018-06-07 NOTE — ASSESSMENT & PLAN NOTE
Wearing c collar     MRI neck significantly motion limited but no abnormality seen  - F/u xray cervical spine

## 2018-06-07 NOTE — PLAN OF CARE
Cm spoke to the patient's sister at bedside and the patient's son Billy via phone regarding discharge plan. Cm explained [facility] vs [facility] SNF. Billy gave Cm three choices: [facility], [facility], and [facility]. Sw notified. Cm will continue to follow.

## 2018-06-07 NOTE — PROGRESS NOTES
Notified stroke team that patient is in a-fib with HR sustaining in the 135-140 range. Also asked for order for accuchecks.

## 2018-06-07 NOTE — SUBJECTIVE & OBJECTIVE
Neurologic Chief Complaint: Right MCA embolic stroke      Subjective:     Interval History: Patient is seen for follow-up neurological assessment and treatment recommendations:    S/p extubation / neuro ICU stepdown. NIHSS 0. Afib noted on tele.     HPI, Past Medical, Family, and Social History remains the same as documented in the initial encounter.     Review of Systems   Constitutional: Negative for fever.   Musculoskeletal: Positive for arthralgias and neck pain.        Back and rib pain   Neurological: Negative for speech difficulty and weakness.     Scheduled Meds:   albuterol-ipratropium  3 mL Nebulization Q8H    amitriptyline  50 mg Oral QHS    aspirin  81 mg Oral Daily    atorvastatin  40 mg Oral Daily    baclofen  5 mg Oral QID    busPIRone  10 mg Oral BID    carvedilol  12.5 mg Oral BID    heparin (porcine)  5,000 Units Subcutaneous Q8H    levothyroxine  175 mcg Oral Daily    lidocaine  1 patch Transdermal Q24H    ramelteon  8 mg Oral QHS    senna-docusate 8.6-50 mg  2 tablet Oral BID    sodium chloride 0.9%  3 mL Intravenous Q8H     Continuous Infusions:   sodium chloride 0.9% 100 mL/hr at 06/07/18 0000     PRN Meds:acetaminophen, dextrose 50%, dextrose 50%, glucagon (human recombinant), glucose, HYDROcodone-acetaminophen, insulin aspart U-100, ondansetron    Objective:     Vital Signs (Most Recent):  Temp: 98.5 °F (36.9 °C) (06/07/18 1119)  Pulse: (!) 123 (06/07/18 1119)  Resp: 18 (06/07/18 1119)  BP: 137/77 (06/07/18 1119)  SpO2: 97 % (06/07/18 1119)  BP Location: Right arm    Vital Signs Range (Last 24H):  Temp:  [98.2 °F (36.8 °C)-98.7 °F (37.1 °C)]   Pulse:  [102-127]   Resp:  [11-35]   BP: (134-172)/(62-81)   SpO2:  [93 %-100 %]   BP Location: Right arm    Physical Exam   Constitutional: She is oriented to person, place, and time. She appears well-developed and well-nourished.   HENT:   Head: Normocephalic and atraumatic.   Eyes: EOM are normal. Pupils are equal, round, and reactive  to light.   Neck:   Wearing c collar - MRI ordered to clear   Cardiovascular: Normal rate.    Pulmonary/Chest: Effort normal.   Musculoskeletal: Normal range of motion.   Neurological: She is alert and oriented to person, place, and time.   Skin: Skin is warm.   Nursing note and vitals reviewed.      Neurological Exam:   LOC: alert  Attention Span: Good   Language: No aphasia  Articulation: No dysarthria  Orientation: Person, Place, Time   Visual Fields: Full  EOM (CN III, IV, VI): Full/intact  Facial Movement (CN VII): Symmetric facial expression    Motor: Arm left  Normal 5/5  Leg left  Normal 5/5  Arm right  Normal 5/5  Leg right Normal 5/5  Sensation: Intact to light touch, temperature and vibration  Tone: Normal tone throughout    Laboratory:  CMP:     Recent Labs  Lab 06/07/18  0558   CALCIUM 7.6*   ALBUMIN 2.4*   PROT 6.1      K 4.9   CO2 20*   *   BUN 16   CREATININE 1.0   ALKPHOS 105   ALT 13   AST 14   BILITOT 0.4     CBC:     Recent Labs  Lab 06/07/18  0558   WBC 8.13   RBC 3.19*   HGB 8.9*   HCT 28.1*      MCV 88   MCH 27.9   MCHC 31.7*     Lipid Panel:     Recent Labs  Lab 06/04/18 2049   CHOL 109*   LDLCALC 51.8*   HDL 37*   TRIG 101     Coagulation:     Recent Labs  Lab 06/04/18 2049   INR 1.1   APTT <21.0     Platelet Aggregation Study: No results for input(s): PLTAGG, PLTAGINTERP, PLTAGREGLACO, ADPPLTAGGREG in the last 168 hours.  Hgb A1C:     Recent Labs  Lab 06/04/18 2049   HGBA1C 6.1*     TSH:     Recent Labs  Lab 06/04/18 2049   TSH <0.010*         Diagnostic Results:  CTA MP 6/4/18 - reviewed with Dr España in real time   Radiology read- Possible right frontal area of hypoattenuation with evaluation limited by motion artifact.  Additional left centrum semiovale area of hypoattenuation possibly a recent or remote infarction.    Mild luminal irregularity of the right proximal MCA as seen on series 5, image 1442.  No evidence of large vessel occlusion in the intracranial  circulation.    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  The overall evaluation is difficult given motion within this region.  Outpatient repeat CTA of the head is suggested for further evaluation.    No hemodynamically significant stenosis in the neck vessels.     MRI:   Noncontrast MRI of the brain demonstrates no acute hemorrhage or infarct.    Mild chronic microvascular ischemic changes in the supratentorial white matter.    Echo     CONCLUSIONS     1 - Normal left ventricular systolic function (EF 65-70%).     2 - Indeterminate LV diastolic function.     3 - Normal right ventricular systolic function .     4 - Pulmonary hypertension. The estimated PA systolic pressure is 51 mmHg.     5 - Trivial tricuspid regurgitation.     6 - Moderate left atrial enlargement.     7 - Concentric remodeling.     8 - No wall motion abnormalities.

## 2018-06-07 NOTE — PLAN OF CARE
Problem: Occupational Therapy Goal  Goal: Occupational Therapy Goal  Goals to be met by: 6/15/2018    Patient will increase functional independence with ADLs by performing:    UE Dressing with Contact Guard Assistance.  LE Dressing with Minimal Assistance.  Grooming while standing at sink with Contact Guard Assistance.  Toileting from toilet with Contact Guard Assistance for hygiene and clothing management.   Sitting at edge of bed x 15 minutes with Contact Guard Assistance. Met 6/7  Revised: sitting EOB x15 with SBA for dynamic sitting balance.   Supine to sit with Minimal Assistance.  Toilet transfer to toilet with Minimal Assistance.     Outcome: Ongoing (interventions implemented as appropriate)  Continue with POC.   Shannon rabago OT  6/7/2018

## 2018-06-07 NOTE — PLAN OF CARE
OSNF is reviewing pt's referral to determine if patient is appropriate for the facility. SW will continue to follow.    Samantha Molina LMSW  [facility]  Ext. [phone]

## 2018-06-07 NOTE — PROGRESS NOTES
[facility]  Vascular Neurology  Comprehensive Stroke Center  Progress Note    Assessment/Plan:     Embolic stroke involving right middle cerebral artery    67 year old female with left sided weakness, gaze preference, and speech diffculties found down at home, intubated and sedated prior to transfer for potential thrombectomy after tpa treatment.     - CTA: negative for LVO   - MRI: negative for diffusion restrictions   - Tele: Afib / RVR   - Echo: + LA enlargements     - Presumed cardio-embolic etiology     Antithrombotics for secondary stroke prevention: 6/7 apixaban 5 mg BID     Statins for secondary stroke prevention and hyperlipidemia, if present: Atorvastatin 40 mg OD    Aggressive risk factor modification: HTN, DM, HLD     Rehab efforts: PT/OT/SLP to evaluate and treat - Recommendations SNF      Diagnostics ordered/pending: none    VTE prophylaxis: Apixaban     BP parameters: Infarct: Post tPA, SBP <180        A-fib    - new onset / [id number]  >2   - rate control with lopressor  - initiated NOAC         Dyslipidemia    Takes fenofibrate at home  No statin on home meds    LDL 51  - Initiated atorvastatin 20 mg         Obesity due to excess calories    Stroke risk factor  Consult nutrition for counseling when appropriate          Essential hypertension    Stroke risk factor  SBP < 180 post tpa     - On BB         Diabetes    Stroke risk factor, SSI while inpatient  On metformin at home          COPD (chronic obstructive pulmonary disease)    Stating well on 2L NC  - scheduled duo-nebs         Neck pain    Wearing c collar     MRI neck significantly motion limited but no abnormality seen  - F/u xray cervical spine         Other specified hypothyroidism    Continue synthroid        Left-sided weakness    - resolved         Depression    - continue elavil              6/5/18 - improvement overnight, extubated. No noted weakness or speech difficulties   6/6/18 discussed HPI with patient's son  who stated that when he found his mother down, she had BL weakness and was moving her eyes left and right. Patient denies having unilateral weakness after falling.     STROKE DOCUMENTATION   Acute Stroke Times   Last Known Normal Date: 06/04/18  Last Known Normal Time: 1700  Symptom Onset Date: 06/04/18  Symptom Onset Time: 1720  Stroke Team Called Date: 06/04/18  Stroke Team Arrival Date: 06/04/18  Stroke Team Arrival Time:  (arrived in ED at 8:07 pm, PA awaiting arrival)  CT Interpretation Time:  (initial ct read by phoebe SYKES )    NIH Scale:  1a. Level Of Consciousness: 0-->Alert: keenly responsive  1b. LOC Questions: 0-->Answers both questions correctly  1c. LOC Commands: 0-->Performs both tasks correctly  2. Best Gaze: 0-->Normal  3. Visual: 0-->No visual loss  4. Facial Palsy: 0-->Normal symmetrical movements  5a. Motor Arm, Left: 0-->No drift: limb holds 90 (or 45) degrees for full 10 secs  5b. Motor Arm, Right: 0-->No drift: limb holds 90 (or 45) degrees for full 10 secs  6a. Motor Leg, Left: 0-->No drift: leg holds 30 degree position for full 5 secs  6b. Motor Leg, Right: 0-->No drift: leg holds 30 degree position for full 5 secs  7. Limb Ataxia: 0-->Absent  8. Sensory: 0-->Normal: no sensory loss  9. Best Language: 0-->No aphasia: normal  10. Dysarthria: 0-->Normal  11. Extinction and Inattention (formerly Neglect): 0-->No abnormality  Total (NIH Stroke Scale): 0       Modified [place] Score: 1  [place] Coma Scale:    ABCD2 Score:    [id number] Score:   HAS -BLED Score:   ICH Score:   Hunt & Kothari Classification:      Hemorrhagic change of an Ischemic Stroke: Does this patient have an ischemic stroke with hemorrhagic changes? No     Neurologic Chief Complaint: Right MCA embolic stroke      Subjective:     Interval History: Patient is seen for follow-up neurological assessment and treatment recommendations:    S/p extubation / neuro ICU stepdown. NIHSS 0. Afib noted on tele.     HPI, Past Medical, Family,  and Social History remains the same as documented in the initial encounter.     Review of Systems   Constitutional: Negative for fever.   Musculoskeletal: Positive for arthralgias and neck pain.        Back and rib pain   Neurological: Negative for speech difficulty and weakness.     Scheduled Meds:   albuterol-ipratropium  3 mL Nebulization Q8H    amitriptyline  50 mg Oral QHS    aspirin  81 mg Oral Daily    atorvastatin  40 mg Oral Daily    baclofen  5 mg Oral QID    busPIRone  10 mg Oral BID    carvedilol  12.5 mg Oral BID    heparin (porcine)  5,000 Units Subcutaneous Q8H    levothyroxine  175 mcg Oral Daily    lidocaine  1 patch Transdermal Q24H    ramelteon  8 mg Oral QHS    senna-docusate 8.6-50 mg  2 tablet Oral BID    sodium chloride 0.9%  3 mL Intravenous Q8H     Continuous Infusions:   sodium chloride 0.9% 100 mL/hr at 06/07/18 0000     PRN Meds:acetaminophen, dextrose 50%, dextrose 50%, glucagon (human recombinant), glucose, HYDROcodone-acetaminophen, insulin aspart U-100, ondansetron    Objective:     Vital Signs (Most Recent):  Temp: 98.5 °F (36.9 °C) (06/07/18 1119)  Pulse: (!) 123 (06/07/18 1119)  Resp: 18 (06/07/18 1119)  BP: 137/77 (06/07/18 1119)  SpO2: 97 % (06/07/18 1119)  BP Location: Right arm    Vital Signs Range (Last 24H):  Temp:  [98.2 °F (36.8 °C)-98.7 °F (37.1 °C)]   Pulse:  [102-127]   Resp:  [11-35]   BP: (134-172)/(62-81)   SpO2:  [93 %-100 %]   BP Location: Right arm    Physical Exam   Constitutional: She is oriented to person, place, and time. She appears well-developed and well-nourished.   HENT:   Head: Normocephalic and atraumatic.   Eyes: EOM are normal. Pupils are equal, round, and reactive to light.   Neck:   Wearing c collar - MRI ordered to clear   Cardiovascular: Normal rate.    Pulmonary/Chest: Effort normal.   Musculoskeletal: Normal range of motion.   Neurological: She is alert and oriented to person, place, and time.   Skin: Skin is warm.   Nursing note  and vitals reviewed.      Neurological Exam:   LOC: alert  Attention Span: Good   Language: No aphasia  Articulation: No dysarthria  Orientation: Person, Place, Time   Visual Fields: Full  EOM (CN III, IV, VI): Full/intact  Facial Movement (CN VII): Symmetric facial expression    Motor: Arm left  Normal 5/5  Leg left  Normal 5/5  Arm right  Normal 5/5  Leg right Normal 5/5  Sensation: Intact to light touch, temperature and vibration  Tone: Normal tone throughout    Laboratory:  CMP:     Recent Labs  Lab 06/07/18  0558   CALCIUM 7.6*   ALBUMIN 2.4*   PROT 6.1      K 4.9   CO2 20*   *   BUN 16   CREATININE 1.0   ALKPHOS 105   ALT 13   AST 14   BILITOT 0.4     CBC:     Recent Labs  Lab 06/07/18  0558   WBC 8.13   RBC 3.19*   HGB 8.9*   HCT 28.1*      MCV 88   MCH 27.9   MCHC 31.7*     Lipid Panel:     Recent Labs  Lab 06/04/18 2049   CHOL 109*   LDLCALC 51.8*   HDL 37*   TRIG 101     Coagulation:     Recent Labs  Lab 06/04/18 2049   INR 1.1   APTT <21.0     Platelet Aggregation Study: No results for input(s): PLTAGG, PLTAGINTERP, PLTAGREGLACO, ADPPLTAGGREG in the last 168 hours.  Hgb A1C:     Recent Labs  Lab 06/04/18 2049   HGBA1C 6.1*     TSH:     Recent Labs  Lab 06/04/18 2049   TSH <0.010*         Diagnostic Results:  CTA MP 6/4/18 - reviewed with Dr España in real time   Radiology read- Possible right frontal area of hypoattenuation with evaluation limited by motion artifact.  Additional left centrum semiovale area of hypoattenuation possibly a recent or remote infarction.    Mild luminal irregularity of the right proximal MCA as seen on series 5, image 1442.  No evidence of large vessel occlusion in the intracranial circulation.    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  The overall evaluation is difficult given motion within this region.  Outpatient repeat CTA of the head is suggested for further evaluation.    No hemodynamically significant stenosis in the neck vessels.     MRI:    Noncontrast MRI of the brain demonstrates no acute hemorrhage or infarct.    Mild chronic microvascular ischemic changes in the supratentorial white matter.    Echo     CONCLUSIONS     1 - Normal left ventricular systolic function (EF 65-70%).     2 - Indeterminate LV diastolic function.     3 - Normal right ventricular systolic function .     4 - Pulmonary hypertension. The estimated PA systolic pressure is 51 mmHg.     5 - Trivial tricuspid regurgitation.     6 - Moderate left atrial enlargement.     7 - Concentric remodeling.     8 - No wall motion abnormalities.       Fito De Luna MD  Comprehensive Stroke Center  Department of Vascular Neurology   [facility]

## 2018-06-07 NOTE — PT/OT/SLP PROGRESS
Occupational Therapy   Treatment    Name: Olimpia Cardoza  MRN: [id number]  Admitting Diagnosis:  R MCA Stroke    Recommendations:     Discharge Recommendations: nursing facility, skilled  Discharge Equipment Recommendations:   (TBD at nexst level)  Barriers to discharge:  None    Subjective     Communicated with: RN prior to session.  Pain/Comfort:  · Pain Rating 1:  (reports pain on R side of abdomen ( rib fracture); did not rate pain)  · Pain Addressed 1: Reposition, Distraction, Nurse notified  · Pain Rating Post-Intervention 1: 0/10    Patients cultural, spiritual, Jehovah's witness conflicts given the current situation: None reported    Objective:     Patient found with: bed alarm, telemetry, pulse ox (continuous), peripheral IV, oxygen    General Precautions: Standard, aspiration, fall, seizure   Orthopedic Precautions : Still awaiting clearance of C-spine ( spinal pxs)   ** C-collars too big for pt; team notified     Braces: C-Collar     Occupational Performance:    Bed Mobility:    · Patient completed Rolling/Turning to Right with minimum assistance  · Patient completed Scooting/Bridging with minimum assistance  · Patient completed Supine to Sit with moderate assistance  * Pt left seated in bedside chair    Sitting EOB:   Pt sat EOB ~15 minutes with SBA for sitting balance; Challenging core control, static/dynamic sitting balance, and midline orientation.    Functional Mobility/Transfers:  · Patient completed Sit <> Stand Transfer with minimum assistance  with  no assistive device   · Patient completed Bed <> Chair Transfer using Stand Pivot technique with minimum assistance with HHA  · Functional Mobility: Pt complete functional mobility from EOB < bedside chair with minimal assitance and HHA. She tolerated well with no LOB or SOB, however complaints of pain     Activities of Daily Living:  · Grooming: stand by assistance to brush hair and wash face while seated EOB  · UB Dressing: minimum assistance to nikki gown  like jacketw while seated EOB; no assistance provided for tie completion  · LB Dressing: maximal assistance to nikki B socks seated EOB    Patient left up in chair with all lines intact, call button in reach and RN notified    [facility] 6 Click:  [facility] Total Score: 17    Treatment & Education:  -Pt edu on OT role/POC  -Importance of OOB activity with staff assistance-- UIC during each meal   -Safety during functional t/f and mobility  ( C-collar donned upon arrival)  -Communication board updated  - Multiple self care tasks completed-- noted above   - She completed x5 trials sit <> stand from EOB with minimal assistance; transferred to bedside chair  Education:    Assessment:     Olimpia Cardoza is a 67 y.o. female with a medical diagnosis of R MCA stroke.  She presents with pain in abdomen 2/2 rib fracture limiting session this date. However pt  is progressing well with goals this date.  Performance deficits affecting function are weakness, impaired endurance, impaired self care skills, impaired functional mobilty, impaired balance, gait instability, pain, orthopedic precautions.  Pt would benefit from SS SNF following d/c to continue to progress towards goals and improve quality of life.     Rehab Prognosis:  Good; patient would benefit from acute skilled OT services to address these deficits and reach maximum level of function.       Plan:     Patient to be seen 4 x/week to address the above listed problems via self-care/home management, therapeutic activities, therapeutic exercises, neuromuscular re-education  · Plan of Care Expires: 07/05/18  · Plan of Care Reviewed with: patient, daughter    This Plan of care has been discussed with the patient who was involved in its development and understands and is in agreement with the identified goals and treatment plan    GOALS:    Occupational Therapy Goals        Problem: Occupational Therapy Goal    Goal Priority Disciplines Outcome Interventions   Occupational Therapy Goal      OT, PT/OT Ongoing (interventions implemented as appropriate)    Description:  Goals to be met by: 6/15/2018    Patient will increase functional independence with ADLs by performing:    UE Dressing with Contact Guard Assistance.  LE Dressing with Minimal Assistance.  Grooming while standing at sink with Contact Guard Assistance.  Toileting from toilet with Contact Guard Assistance for hygiene and clothing management.   Sitting at edge of bed x 15 minutes with Contact Guard Assistance. Met 6/7  Revised: sitting EOB x15 with SBA for dynamic sitting balance.   Supine to sit with Minimal Assistance.  Toilet transfer to toilet with Minimal Assistance.                       Time Tracking:     OT Date of Treatment: 06/07/18  OT Start Time: 1000  OT Stop Time: 1032  OT Total Time (min): 32 min    Billable Minutes:Self Care/Home Management 15  Therapeutic Exercise 17    Shannon rabago OT  6/7/2018

## 2018-06-07 NOTE — PLAN OF CARE
SW had received message from Billy, Pt son regarding having his choices for SNF.     SW returned his call. He had given his choices to the CM, which are: [facility] or [facility]. Referrals have already been completed. New SW and CM to handle DC planning from here.    Lorri Stratton, KLEBER  Neurocritical Care   [facility]  [id number]

## 2018-06-07 NOTE — PLAN OF CARE
Problem: SLP Goal  Goal: SLP Goal  Speech Language Pathology Goals  Goals expected to be met by 6/13  1. Pt will tolerate puree diet with thin liquids and no overt s/s of aspiration.   2. Pt will tolerate soft solid trials to determine if /when appropriate for diet upgrade.  3. Pt will orient x4 with min cues, to improve functional memory.  4.  Pt will follow 2 step command with 90% accuracy, to improve receptive language skills.   5. Pt will respond to simple problem solving tasks with 70% accuracy, given mod cues, to improve functional problem solving skills.   6.  Pt will list 10 items in one minute on word fluency task, to improve thought organization skills.   7.  Further evaluate reading/writing/visual-spatial skills.         Goals due ton 6/12  1.Pt will participate in ongoing swallow assessment-met  2 pt will participate in sle -met   Outcome: Ongoing (interventions implemented as appropriate)    Pt seen for ongoing swallow assessment and cognitive-linguistic assessment. Continue to recommend pureed solids w thin liquids. ST to continue to follow according to POC.     FARAZ Vaughn.S., CCC-SLP  Speech Language Pathologist  [phone] - pager   6/7/2018

## 2018-06-07 NOTE — PT/OT/SLP PROGRESS
"Speech Language Pathology Treatment    Patient Name:  Olimpia Cardoza   MRN:  [id number]  Admitting Diagnosis: R MCA CVA    Recommendations:                 General Recommendations:  Dysphagia therapy, Speech/language therapy and Cognitive-linguistic therapy  Diet recommendations:  Puree, Liquid Diet Level: Thin   Aspiration Precautions: 1 bite/sip at a time, Alternating bites/sips, Eliminate distractions, Feed only when awake/alert, HOB to 90 degrees, Meds crushed in puree, Monitor for s/s of aspiration, Remain upright 30 minutes post meal, Small bites/sips and Strict aspiration precautions   General Precautions: Standard, aspiration, fall, seizure  Communication strategies:  provide increased time to answer    Subjective     Pt cooperative throughout ST session. Pt stated "about the same" referring to her cough prior to admission and now.        Pain/Comfort:  · Pain Rating 1: 10/10  · Location - Orientation 1:  (ribs)  · Pain Addressed 1: Nurse notified, Distraction  · Pain Rating Post-Intervention 1: 10/10    Objective:     Has the patient been evaluated by SLP for swallowing?   Yes  Keep patient NPO? No   Current Respiratory Status:        Pt seen bedside for swallowing and cognitive linguistic tx. Pt positioned upright in chair with appropriate positioning for po trials. Pt offered and accepted thin liquids via cup and 1/4 cracker. Pt consumed 1/4 cracker and cup sips thin liquids X6. Pt demonstrated 1 cough post cracker and 1 cough after large sip of sequential sips of thin liquids via cup sip. Pt with increased mastication time, however cleared trace oral residue with liquid wash and additional swallow. Pt reported she did not want any more cracker trials as pt was in severe pain in ribs. Nurse notified of 10/10 pain. Per pt report, pt recently received pain medications prior to session. Pt educated on current diet, s/s of aspiration, and aspiration precautions.   Pt able to write name and short sentence with no " difficulty. For clock drawing task, pt with poor organization and planning and decreased placement and spacing of numbers. Pt reported her clock did not look right. Upon redrawing clock, pt with same deficits noted above. Pt able compare two items with 80% accy independently increasing to 100% accy with min A. Pt contrasted two items with 60% accy independently increasing to 90% accy with min A. Pt generated 6 fruits/1 minute independently and 10 clothing items/1 minute with min A. Per sister report, pt has some memory deficits at baseline, however she has noticed recent changes in memory since recent stroke.   Pt educated on POC and role of SLP. Pt verbalized understanding.   Whiteboard updated.     Assessment:     Olimpia Cardoza is a 67 y.o. female with an SLP diagnosis of Dysphagia and Cognitive-Linguistic Impairment.      Goals:    SLP Goals        Problem: SLP Goal    Goal Priority Disciplines Outcome   SLP Goal     SLP Ongoing (interventions implemented as appropriate)   Description:  Speech Language Pathology Goals  Goals expected to be met by 6/13  1. Pt will tolerate puree diet with thin liquids and no overt s/s of aspiration.   2. Pt will tolerate soft solid trials to determine if /when appropriate for diet upgrade.  3. Pt will orient x4 with min cues, to improve functional memory.  4.  Pt will follow 2 step command with 90% accuracy, to improve receptive language skills.   5. Pt will respond to simple problem solving tasks with 70% accuracy, given mod cues, to improve functional problem solving skills.   6.  Pt will list 10 items in one minute on word fluency task, to improve thought organization skills.   7.  Further evaluate reading/writing/visual-spatial skills.         Goals due ton 6/12  1.Pt will participate in ongoing swallow assessment-met  2 pt will participate in sle -met                    Plan:     · Patient to be seen:  5 x/week   · Plan of Care expires:     · Plan of Care reviewed with:   patient   · SLP Follow-Up:  Yes       Discharge recommendations:  nursing facility, skilled       Time Tracking:     SLP Treatment Date:   06/07/18  Speech Start Time:  1147  Speech Stop Time:  1209     Speech Total Time (min):  22 min    Billable Minutes: Speech Therapy Individual 14 and Treatment Swallowing Dysfunction 8     Tiana Krishnan M.S., CCC-SLP  Speech Language Pathologist  [phone] - pager   6/7/2018      Tiana Krishnan, MS CCC-SLP  06/07/2018

## 2018-06-07 NOTE — PLAN OF CARE
Cm spoke to Dr. Rajan about the patient's possible acceptance to [facility]. Dr. Rajan said the patient looks appropriate but her heart rate is too high but if she stabilizes they could possibly accept tomorrow. Cm will continue to follow.

## 2018-06-08 PROBLEM — I48.91 A-FIB: Status: ACTIVE | Noted: 2018-06-08

## 2018-06-08 PROBLEM — J44.9 COPD (CHRONIC OBSTRUCTIVE PULMONARY DISEASE): Status: ACTIVE | Noted: 2018-06-08

## 2018-06-08 LAB
ALBUMIN SERPL BCP-MCNC: 2.3 G/DL
ALP SERPL-CCNC: 93 U/L
ALT SERPL W/O P-5'-P-CCNC: 12 U/L
ANION GAP SERPL CALC-SCNC: 8 MMOL/L
AST SERPL-CCNC: 13 U/L
BASOPHILS # BLD AUTO: 0.02 K/UL
BASOPHILS NFR BLD: 0.3 %
BILIRUB SERPL-MCNC: 0.4 MG/DL
BUN SERPL-MCNC: 15 MG/DL
CALCIUM SERPL-MCNC: 7.8 MG/DL
CHLORIDE SERPL-SCNC: 109 MMOL/L
CO2 SERPL-SCNC: 22 MMOL/L
CREAT SERPL-MCNC: 0.9 MG/DL
DIFFERENTIAL METHOD: ABNORMAL
EOSINOPHIL # BLD AUTO: 0 K/UL
EOSINOPHIL NFR BLD: 0.5 %
ERYTHROCYTE [DISTWIDTH] IN BLOOD BY AUTOMATED COUNT: 15.1 %
EST. GFR  (AFRICAN AMERICAN): >60 ML/MIN/1.73 M^2
EST. GFR  (NON AFRICAN AMERICAN): >60 ML/MIN/1.73 M^2
GLUCOSE SERPL-MCNC: 117 MG/DL
HCT VFR BLD AUTO: 24.8 %
HGB BLD-MCNC: 7.9 G/DL
IMM GRANULOCYTES # BLD AUTO: 0.05 K/UL
IMM GRANULOCYTES NFR BLD AUTO: 0.7 %
LYMPHOCYTES # BLD AUTO: 1.2 K/UL
LYMPHOCYTES NFR BLD: 16.2 %
MAGNESIUM SERPL-MCNC: 1.7 MG/DL
MCH RBC QN AUTO: 27.9 PG
MCHC RBC AUTO-ENTMCNC: 31.9 G/DL
MCV RBC AUTO: 88 FL
MONOCYTES # BLD AUTO: 0.6 K/UL
MONOCYTES NFR BLD: 8.3 %
NEUTROPHILS # BLD AUTO: 5.5 K/UL
NEUTROPHILS NFR BLD: 74 %
NRBC BLD-RTO: 0 /100 WBC
PHOSPHATE SERPL-MCNC: 2.1 MG/DL
PLATELET # BLD AUTO: 210 K/UL
PMV BLD AUTO: 9.9 FL
POCT GLUCOSE: 112 MG/DL (ref 70–110)
POCT GLUCOSE: 116 MG/DL (ref 70–110)
POTASSIUM SERPL-SCNC: 5.1 MMOL/L
PROT SERPL-MCNC: 5.8 G/DL
RBC # BLD AUTO: 2.83 M/UL
SODIUM SERPL-SCNC: 139 MMOL/L
WBC # BLD AUTO: 7.47 K/UL

## 2018-06-08 PROCEDURE — 63600175 PHARM REV CODE 636 W HCPCS: Performed by: STUDENT IN AN ORGANIZED HEALTH CARE EDUCATION/TRAINING PROGRAM

## 2018-06-08 PROCEDURE — 25000003 PHARM REV CODE 250: Performed by: STUDENT IN AN ORGANIZED HEALTH CARE EDUCATION/TRAINING PROGRAM

## 2018-06-08 PROCEDURE — 97110 THERAPEUTIC EXERCISES: CPT

## 2018-06-08 PROCEDURE — 84100 ASSAY OF PHOSPHORUS: CPT

## 2018-06-08 PROCEDURE — 99233 SBSQ HOSP IP/OBS HIGH 50: CPT | Mod: GC,,, | Performed by: PSYCHIATRY & NEUROLOGY

## 2018-06-08 PROCEDURE — 25000003 PHARM REV CODE 250: Performed by: PSYCHIATRY & NEUROLOGY

## 2018-06-08 PROCEDURE — 25000003 PHARM REV CODE 250: Performed by: NURSE PRACTITIONER

## 2018-06-08 PROCEDURE — 94761 N-INVAS EAR/PLS OXIMETRY MLT: CPT

## 2018-06-08 PROCEDURE — 93005 ELECTROCARDIOGRAM TRACING: CPT

## 2018-06-08 PROCEDURE — 20600001 HC STEP DOWN PRIVATE ROOM

## 2018-06-08 PROCEDURE — 80053 COMPREHEN METABOLIC PANEL: CPT

## 2018-06-08 PROCEDURE — 85025 COMPLETE CBC W/AUTO DIFF WBC: CPT

## 2018-06-08 PROCEDURE — 27000221 HC OXYGEN, UP TO 24 HOURS

## 2018-06-08 PROCEDURE — 92507 TX SP LANG VOICE COMM INDIV: CPT

## 2018-06-08 PROCEDURE — 92526 ORAL FUNCTION THERAPY: CPT

## 2018-06-08 PROCEDURE — 97535 SELF CARE MNGMENT TRAINING: CPT

## 2018-06-08 PROCEDURE — A4216 STERILE WATER/SALINE, 10 ML: HCPCS | Performed by: NURSE PRACTITIONER

## 2018-06-08 PROCEDURE — 93010 ELECTROCARDIOGRAM REPORT: CPT | Mod: ,,, | Performed by: INTERNAL MEDICINE

## 2018-06-08 PROCEDURE — 25000242 PHARM REV CODE 250 ALT 637 W/ HCPCS: Performed by: STUDENT IN AN ORGANIZED HEALTH CARE EDUCATION/TRAINING PROGRAM

## 2018-06-08 PROCEDURE — 25000003 PHARM REV CODE 250: Performed by: HOSPITALIST

## 2018-06-08 PROCEDURE — 83735 ASSAY OF MAGNESIUM: CPT

## 2018-06-08 PROCEDURE — 97116 GAIT TRAINING THERAPY: CPT

## 2018-06-08 PROCEDURE — 36415 COLL VENOUS BLD VENIPUNCTURE: CPT

## 2018-06-08 PROCEDURE — 94640 AIRWAY INHALATION TREATMENT: CPT

## 2018-06-08 RX ORDER — METOPROLOL TARTRATE 25 MG/1
25 TABLET, FILM COATED ORAL EVERY 8 HOURS
Status: DISCONTINUED | OUTPATIENT
Start: 2018-06-08 | End: 2018-06-09 | Stop reason: HOSPADM

## 2018-06-08 RX ORDER — FUROSEMIDE 20 MG/1
20 TABLET ORAL 2 TIMES DAILY
Status: DISCONTINUED | OUTPATIENT
Start: 2018-06-08 | End: 2018-06-09 | Stop reason: HOSPADM

## 2018-06-08 RX ORDER — ATORVASTATIN CALCIUM 20 MG/1
20 TABLET, FILM COATED ORAL DAILY
Qty: 90 TABLET | Refills: 3 | Status: ON HOLD | OUTPATIENT
Start: 2018-06-09 | End: 2018-06-14 | Stop reason: HOSPADM

## 2018-06-08 RX ORDER — BISACODYL 10 MG
10 SUPPOSITORY, RECTAL RECTAL ONCE
Status: COMPLETED | OUTPATIENT
Start: 2018-06-08 | End: 2018-06-08

## 2018-06-08 RX ORDER — HEPARIN SODIUM 5000 [USP'U]/ML
5000 INJECTION, SOLUTION INTRAVENOUS; SUBCUTANEOUS EVERY 8 HOURS
Status: DISCONTINUED | OUTPATIENT
Start: 2018-06-08 | End: 2018-06-09 | Stop reason: HOSPADM

## 2018-06-08 RX ORDER — METOPROLOL TARTRATE 25 MG/1
25 TABLET, FILM COATED ORAL EVERY 8 HOURS
Qty: 90 TABLET | Refills: 11 | Status: SHIPPED | OUTPATIENT
Start: 2018-06-08 | End: 2019-06-08

## 2018-06-08 RX ORDER — NAPROXEN SODIUM 220 MG/1
81 TABLET, FILM COATED ORAL DAILY
Status: DISCONTINUED | OUTPATIENT
Start: 2018-06-09 | End: 2018-06-09 | Stop reason: HOSPADM

## 2018-06-08 RX ADMIN — APIXABAN 5 MG: 5 TABLET, FILM COATED ORAL at 08:06

## 2018-06-08 RX ADMIN — BISACODYL 10 MG: 10 SUPPOSITORY RECTAL at 11:06

## 2018-06-08 RX ADMIN — IPRATROPIUM BROMIDE AND ALBUTEROL SULFATE 3 ML: .5; 3 SOLUTION RESPIRATORY (INHALATION) at 03:06

## 2018-06-08 RX ADMIN — LEVOTHYROXINE SODIUM 175 MCG: 150 TABLET ORAL at 03:06

## 2018-06-08 RX ADMIN — IPRATROPIUM BROMIDE AND ALBUTEROL SULFATE 3 ML: .5; 3 SOLUTION RESPIRATORY (INHALATION) at 08:06

## 2018-06-08 RX ADMIN — BACLOFEN 5 MG: 10 TABLET ORAL at 01:06

## 2018-06-08 RX ADMIN — IPRATROPIUM BROMIDE AND ALBUTEROL SULFATE 3 ML: .5; 3 SOLUTION RESPIRATORY (INHALATION) at 01:06

## 2018-06-08 RX ADMIN — ACETAMINOPHEN 650 MG: 325 TABLET ORAL at 08:06

## 2018-06-08 RX ADMIN — STANDARDIZED SENNA CONCENTRATE AND DOCUSATE SODIUM 2 TABLET: 8.6; 5 TABLET, FILM COATED ORAL at 08:06

## 2018-06-08 RX ADMIN — METOPROLOL TARTRATE 25 MG: 25 TABLET ORAL at 08:06

## 2018-06-08 RX ADMIN — BACLOFEN 5 MG: 10 TABLET ORAL at 05:06

## 2018-06-08 RX ADMIN — FUROSEMIDE 20 MG: 20 TABLET ORAL at 06:06

## 2018-06-08 RX ADMIN — BACLOFEN 5 MG: 10 TABLET ORAL at 10:06

## 2018-06-08 RX ADMIN — METOPROLOL TARTRATE 25 MG: 25 TABLET ORAL at 10:06

## 2018-06-08 RX ADMIN — BUSPIRONE HYDROCHLORIDE 10 MG: 10 TABLET ORAL at 08:06

## 2018-06-08 RX ADMIN — Medication 3 ML: at 03:06

## 2018-06-08 RX ADMIN — ACETAMINOPHEN 650 MG: 325 TABLET ORAL at 10:06

## 2018-06-08 RX ADMIN — HYDROCODONE BITARTRATE AND ACETAMINOPHEN 1 TABLET: 5; 325 TABLET ORAL at 03:06

## 2018-06-08 RX ADMIN — AMITRIPTYLINE HYDROCHLORIDE 50 MG: 50 TABLET, FILM COATED ORAL at 10:06

## 2018-06-08 RX ADMIN — BACLOFEN 5 MG: 10 TABLET ORAL at 08:06

## 2018-06-08 RX ADMIN — ATORVASTATIN CALCIUM 20 MG: 20 TABLET, FILM COATED ORAL at 08:06

## 2018-06-08 RX ADMIN — HEPARIN SODIUM 5000 UNITS: 5000 INJECTION, SOLUTION INTRAVENOUS; SUBCUTANEOUS at 10:06

## 2018-06-08 RX ADMIN — LIDOCAINE 1 PATCH: 50 PATCH TOPICAL at 03:06

## 2018-06-08 RX ADMIN — BUSPIRONE HYDROCHLORIDE 10 MG: 10 TABLET ORAL at 10:06

## 2018-06-08 RX ADMIN — RAMELTEON 8 MG: 8 TABLET, FILM COATED ORAL at 10:06

## 2018-06-08 NOTE — PT/OT/SLP PROGRESS
"Physical Therapy Treatment    Patient Name:  Olimpia Cardoza   MRN:  [id number]    Recommendations:     Discharge Recommendations:  nursing facility, skilled (short stay)   Discharge Equipment Recommendations: none   Barriers to discharge: Decreased caregiver support (24 hr supervision not available)  Recent Surgery: * No surgery found *      Plan:     During this hospitalization, patient to be seen 4 x/week to address the above listed problems via gait training, therapeutic activities, therapeutic exercises, neuromuscular re-education  · Plan of Care Expires:  07/06/18   Plan of Care Reviewed with: patient, son, daughter    Subjective     Communicated with RN (Carolyn) prior to session, premedicated for therapy.  Chief Complaint: intermittent muscle spasms  Patient comments/goals: "I get SOB and can't breathe.  At home I sleep on pillows, I am almost in a sitting position."  Pain/Comfort:  · Pain Rating 1: 10/10 (R ribs)  · Pain Addressed 1: Pre-medicate for activity, Reposition, Distraction, Cessation of Activity, Nurse notified  · Pain Rating Post-Intervention 1: 10/10      Objective:     Patient found with: bed alarm, telemetry, oxygen     General Precautions: Standard, aspiration, fall     Patient was found supine in bed in NAD.  She agreed to therapy.  Patient performed bed mobility with assistance d/t rib pain.  Gait training limited by SOB and oxygen desaturation.     Functional Mobility:  Bed Mobility:   · Supine to Sit: moderate assistance to elevate trunk  · Sit to Supine: moderate assistance to recline trunk      Sitting Balance at Edge of Bed: SBA with c/o dizziness  No LOB in sitting.   C/o dizziness with no s/s desaturation or orthostatic hypotension (/78)     Transfers:   · Sit <> Stand Transfer:  Min assistance with no AD   · Bed <> Chair Transfer: NT    Gait:  Gait x 5 feet moderate assistance with no AD, considerable instability and patient reaching for the wall  Gait x 30 feet min assistance " with RW and oxygen in tow, complaints of dizziness (without LOB) and SOB.    Vitals:  Activity  HR oxygen   Sitting   95% on 2L   Gait 128 79% on 2L   After 5 minute rest break 109 91% on 2L       Therapeutic Activities and Exercises:   PT educated patient and family on the importance of OOB and upright positioning. Unable to leave patient sitting up in chair d/t chest x-ray at the end of the session, f/b EKG.     At the end of the session, the patient was left supine with HOB elevated with all lines intact, call button in reach, bed alarm on, RN notified and EKG tech present.    AM-PAC 6 CLICK MOBILITY  Turning over in bed (including adjusting bedclothes, sheets and blankets)?: 2  Sitting down on and standing up from a chair with arms (e.g., wheelchair, bedside commode, etc.): 3  Moving from lying on back to sitting on the side of the bed?: 2  Moving to and from a bed to a chair (including a wheelchair)?: 2  Need to walk in hospital room?: 2  Climbing 3-5 steps with a railing?: 2  Total Score: 13     GOALS:    Physical Therapy Goals        Problem: Physical Therapy Goal    Goal Priority Disciplines Outcome Goal Variances Interventions   Physical Therapy Goal     PT/OT, PT Ongoing (interventions implemented as appropriate)     Description:    Goals to be met by 6/15/2018    1. Pt will perform rolling to the R and L with min assistance.   2. Pt will perform supine to sit from both sides of the bed with SBA using spinal precautions.  3. Pt will perform sit to supine with SBA using spinal precautions.  4. Pt will perform sit to stand transfers with CGA and no AD.    5. Pt will perform bed <> chair transfers with min assistance with or without AD.  6. Pt will perform gait x 100 feet with CGA using RW.                    Assessment:     Olimpia Cardoza is a 67 y.o. female admitted with a medical diagnosis of Embolic stroke involving right middle cerebral artery.  Patient participated in therapy despite pain.  Her  gait was limited by SOB and oxygen desaturation down to 79% on 2L.  She required 5 minute rest break to improve SPO2 >90%.  She experienced SOB with exertion prior to admit, but could walk household distances without difficulty.  She is now significantly limited and is not safe to return to her prior living arrangements.  She is expected to improve with additional therapy and OOB mobilization, but is expected to require post-acute therapy to achieve these goals.     She presents with the following impairments/functional limitations:  gait instability, impaired balance, impaired endurance, impaired cognition, impaired self care skills, impaired functional mobilty, decreased safety awareness, impaired cardiopulmonary response to activity, orthopedic precautions, pain.    Rehab Prognosis:  good; patient would benefit from acute skilled PT services to address these deficits and reach maximum level of function.          Time Tracking:     PT Received On: 06/08/18  PT Start Time: 1026     PT Stop Time: 1056  PT Total Time (min): 30 min     Billable Minutes: Gait Training 30    Treatment Type: Treatment  PT/PTA: PT         Gabby Chappell, PT  6/8/2018  [phone] (pager)

## 2018-06-08 NOTE — PLAN OF CARE
Cm spoke to Dr. Rajan at [facility] regarding possible acceptance. Dr. Rajan stated that the patient's Hr was still an issue as well as the H/H dropped. Cm explained that the plan was for discharge tomorrow if the patient's HR stabilizes and the H/H remains stable. Dr. Rajan stated we will see. Cm will continue to follow.

## 2018-06-08 NOTE — SUBJECTIVE & OBJECTIVE
Neurologic Chief Complaint: Right MCA embolic stroke      Subjective:     Interval History: Patient is seen for follow-up neurological assessment and treatment recommendations:    NAEON. No concerns for worsening NIHSS.    Telemetry reviewed / no Afib noted / Dcd NOAC.     HPI, Past Medical, Family, and Social History remains the same as documented in the initial encounter.     Review of Systems   Constitutional: Negative for fever.   Musculoskeletal: Positive for arthralgias and neck pain.        Back and rib pain   Neurological: Negative for speech difficulty and weakness.     Scheduled Meds:   albuterol-ipratropium  3 mL Nebulization Q8H    amitriptyline  50 mg Oral QHS    [START ON 6/9/2018] aspirin  81 mg Oral Daily    atorvastatin  20 mg Oral Daily    baclofen  5 mg Oral QID    busPIRone  10 mg Oral BID    levothyroxine  175 mcg Oral Daily    lidocaine  1 patch Transdermal Q24H    metoprolol tartrate  25 mg Oral Q8H    ramelteon  8 mg Oral QHS    senna-docusate 8.6-50 mg  2 tablet Oral BID    sodium chloride 0.9%  3 mL Intravenous Q8H     Continuous Infusions:    PRN Meds:acetaminophen, dextrose 50%, dextrose 50%, glucagon (human recombinant), glucose, HYDROcodone-acetaminophen, insulin aspart U-100, ondansetron    Objective:     Vital Signs (Most Recent):  Temp: 97.6 °F (36.4 °C) (06/08/18 1625)  Pulse: 98 (06/08/18 1625)  Resp: 18 (06/08/18 1625)  BP: 137/63 (06/08/18 1625)  SpO2: (!) 90 % (06/08/18 1625)  BP Location: Right arm    Vital Signs Range (Last 24H):  Temp:  [97.6 °F (36.4 °C)-98.9 °F (37.2 °C)]   Pulse:  []   Resp:  [17-20]   BP: (121-164)/(61-91)   SpO2:  [90 %-93 %]   BP Location: Right arm    Physical Exam   Constitutional: She is oriented to person, place, and time. She appears well-developed and well-nourished.   HENT:   Head: Normocephalic and atraumatic.   Eyes: EOM are normal. Pupils are equal, round, and reactive to light.   Neck:   Wearing c collar - MRI ordered to  clear   Cardiovascular: Normal rate.    Pulmonary/Chest: Effort normal.   Musculoskeletal: Normal range of motion.   Neurological: She is alert and oriented to person, place, and time.   Skin: Skin is warm.   Nursing note and vitals reviewed.      Neurological Exam:   LOC: alert  Attention Span: Good   Language: No aphasia  Articulation: No dysarthria  Orientation: Person, Place, Time   Visual Fields: Full  EOM (CN III, IV, VI): Full/intact  Facial Movement (CN VII): Symmetric facial expression    Motor: Arm left  Normal 5/5  Leg left  Normal 5/5  Arm right  Normal 5/5  Leg right Normal 5/5  Sensation: Intact to light touch, temperature and vibration  Tone: Normal tone throughout    Laboratory:  CMP:     Recent Labs  Lab 06/08/18 0434   CALCIUM 7.8*   ALBUMIN 2.3*   PROT 5.8*      K 5.1   CO2 22*      BUN 15   CREATININE 0.9   ALKPHOS 93   ALT 12   AST 13   BILITOT 0.4     CBC:     Recent Labs  Lab 06/08/18 0434   WBC 7.47   RBC 2.83*   HGB 7.9*   HCT 24.8*      MCV 88   MCH 27.9   MCHC 31.9*     Lipid Panel:     Recent Labs  Lab 06/04/18 2049   CHOL 109*   LDLCALC 51.8*   HDL 37*   TRIG 101     Coagulation:     Recent Labs  Lab 06/04/18 2049   INR 1.1   APTT <21.0     Platelet Aggregation Study: No results for input(s): PLTAGG, PLTAGINTERP, PLTAGREGLACO, ADPPLTAGGREG in the last 168 hours.  Hgb A1C:     Recent Labs  Lab 06/04/18 2049   HGBA1C 6.1*     TSH:     Recent Labs  Lab 06/04/18 2049   TSH <0.010*         Diagnostic Results:  CTA MP 6/4/18 - reviewed with Dr España in real time   Radiology read- Possible right frontal area of hypoattenuation with evaluation limited by motion artifact.  Additional left centrum semiovale area of hypoattenuation possibly a recent or remote infarction.    Mild luminal irregularity of the right proximal MCA as seen on series 5, image 1442.  No evidence of large vessel occlusion in the intracranial circulation.    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  The overall evaluation is difficult given motion within this region.  Outpatient repeat CTA of the head is suggested for further evaluation.    No hemodynamically significant stenosis in the neck vessels.     MRI:   Noncontrast MRI of the brain demonstrates no acute hemorrhage or infarct.    Mild chronic microvascular ischemic changes in the supratentorial white matter.    Echo     CONCLUSIONS     1 - Normal left ventricular systolic function (EF 65-70%).     2 - Indeterminate LV diastolic function.     3 - Normal right ventricular systolic function .     4 - Pulmonary hypertension. The estimated PA systolic pressure is 51 mmHg.     5 - Trivial tricuspid regurgitation.     6 - Moderate left atrial enlargement.     7 - Concentric remodeling.     8 - No wall motion abnormalities.

## 2018-06-08 NOTE — ASSESSMENT & PLAN NOTE
Stating well on 2L NC  - scheduled duo-nebs   - CXR right mild PE / No concerns for infectious process   - conservative management / active nebs / spirometry

## 2018-06-08 NOTE — PLAN OF CARE
[facility]     Department of Hospital Medicine     [address] [place] [address]     [phone] after hours  [phone] fax       SKILLED NURSING HOME ORDERS    06/08/2018    Admit to Nursing Home:   Skilled Bed                                                Diagnoses:  Active Hospital Problems    Diagnosis  POA    *Right MCA ischemic stroke, aborted by t-PA [I63.511]  Yes     Priority: 1 - High    Dyslipidemia [E78.5]  Yes     Priority: 2     Obesity due to excess calories [E66.09]  Yes     Priority: 2     Diabetes [E11.9]  Yes     Priority: 2      17 yrs      Essential hypertension [I10]  Yes     Priority: 2     COPD (chronic obstructive pulmonary disease) [J44.9]  Yes     Priority: 3     Other specified hypothyroidism [E03.8]  Unknown    Neck pain [M54.2]  Yes    Left-sided weakness [R53.1]  Unknown    Depression [F32.9]  Yes    Hepatomegaly [R16.0]  Yes     Found on Routine imaging        Resolved Hospital Problems    Diagnosis Date Resolved POA   No resolved problems to display.       Patient is homebound due to:  Arterial ischemic stroke, MCA, right, acute    Allergies:  Review of patient's allergies indicates:   Allergen Reactions    Codeine Palpitations       Vitals: As per facility protocol    Diet: Mechanical soft, No Rice, Chopped meat, Liquid Diet Level: Thin (NO STRAWS)     Acitivities: As per facility protocol    LABS:  As per facility protocol    Nursing Precautions:     - Aspiration precautions:             - Total assistance with meals            -  Upright 90 degrees befor during and after meals             -  Suction at bedside          - Fall precautions per nursing home protocol    CONSULTS:     Physical Therapy to evaluate and treat     Occupational Therapy to evaluate and treat     Speech Therapy  to evaluate and treat     Nutrition to evaluate and recommend diet    MISCELLANEOUS CARE: N                  Medications:    Olimpia Cardoza   Home Medication Instructions SUSAN:[id number]    Printed on:06/08/18 4959   Medication Information                      albuterol (PROVENTIL) 5 mg/mL nebulizer solution  Take 2.5 mg by nebulization 3 (three) times daily.              alendronate (FOSAMAX) 70 MG tablet  Take 70 mg by mouth every 7 days.             ALPRAZolam (XANAX) 0.25 MG tablet  Take 0.25 mg by mouth 2 (two) times daily as needed for Anxiety.             amitriptyline (ELAVIL) 50 MG tablet  Take 50 mg by mouth every evening.             ascorbic acid (VITAMIN C) 500 MG tablet  Take 500 mg by mouth once daily.             aspirin 81 MG Chew  Take 81 mg by mouth every evening. Two 81mg  Once a day             atorvastatin (LIPITOR) 20 MG tablet  Take 1 tablet (20 mg total) by mouth once daily.             baclofen (LIORESAL) 10 MG tablet  Take 10 mg by mouth 4 (four) times daily.             buPROPion (WELLBUTRIN XL) 300 MG 24 hr tablet  Take 300 mg by mouth once daily.             busPIRone (BUSPAR) 30 MG Tab  Take 30 mg by mouth 2 (two) times daily.             calcium carbonate (OS-CAREY) 600 mg (1,500 mg) Tab  Take 600 mg by mouth 2 (two) times daily with meals.             citalopram (CELEXA) 40 MG tablet  Take 40 mg by mouth once daily. 1/2 tablet twice daily             furosemide (LASIX) 20 MG tablet  Take 1 tablet (20 mg total) by mouth 2 (two) times daily.             glimepiride (AMARYL) 2 MG tablet  Take 2 mg by mouth before breakfast.             hydrOXYzine (ATARAX) 50 MG tablet  Take 50 mg by mouth 4 (four) times daily as needed.              ipratropium (ATROVENT HFA) 17 mcg/actuation inhaler  Inhale 2 puffs into the lungs 4 (four) times daily.             levothyroxine (SYNTHROID, LEVOTHROID) 175 MCG tablet  Take 175 mcg by mouth once daily.             meloxicam (MOBIC) 7.5 MG tablet  Take 7.5 mg by mouth once daily.             metFORMIN (GLUMETZA) 1000 MG (MOD) 24 hr tablet  Take 1,000 mg by mouth daily with  breakfast.             metoprolol tartrate (LOPRESSOR) 25 MG tablet  Take 1 tablet (25 mg total) by mouth every 8 (eight) hours.             pantoprazole (PROTONIX) 40 MG tablet  Take 40 mg by mouth once daily.             quetiapine (SEROQUEL) 100 MG Tab  Take 100 mg by mouth 2 (two) times daily.              QUEtiapine (SEROQUEL) 200 MG Tab  Take 200 mg by mouth 2 (two) times daily.             sertraline (ZOLOFT) 50 MG tablet  Take 50 mg by mouth once daily.             tiotropium (SPIRIVA) 18 mcg inhalation capsule  Inhale 18 mcg into the lungs once daily.                       _________________________________  Fito De Luna MD  06/08/2018

## 2018-06-08 NOTE — ASSESSMENT & PLAN NOTE
67 year old female with left sided weakness, gaze preference, and speech diffculties found down at home, intubated and sedated prior to transfer for potential thrombectomy after tpa treatment.     - CTA: negative for LVO   - MRI: negative for diffusion restrictions   - Tele: Reviewed / Tachycardia +, No concerns for Afib / RVR   - Echo: + LA enlargements     - Unclear atherothrombotic vs embolic aborted by t-PA    Antithrombotics for secondary stroke prevention: Dcd apixaban for negative tele for Afib on review / Continue ASA    Statins for secondary stroke prevention and hyperlipidemia, if present: Atorvastatin 40 mg OD    Aggressive risk factor modification: HTN, DM, HLD     Rehab efforts: PT/OT/SLP to evaluate and treat - Recommendations [facility]      Diagnostics ordered/pending: none    VTE prophylaxis: Heparin     BP parameters: Infarct: Post tPA, SBP <180

## 2018-06-08 NOTE — PLAN OF CARE
Problem: Physical Therapy Goal  Goal: Physical Therapy Goal    Goals to be met by 6/15/2018    1. Pt will perform rolling to the R and L with min assistance.   2. Pt will perform supine to sit from both sides of the bed with SBA using spinal precautions.  3. Pt will perform sit to supine with SBA using spinal precautions.  4. Pt will perform sit to stand transfers with CGA and no AD.    5. Pt will perform bed <> chair transfers with min assistance with or without AD.  6. Pt will perform gait x 100 feet with CGA using RW.     Outcome: Ongoing (interventions implemented as appropriate)  Patient participated well in therapy.  POC and goals remain appropriate.  Please refer to progress note for functional mobility.     Pt is safe to perform transfers and ambulate with the bathroom with nursing using 1 person assistance and RW (in room). Decreased SPO2 with exertion.      Gabby Chappell, PT  6/8/2018  [phone] (pager)\

## 2018-06-08 NOTE — PLAN OF CARE
Problem: SLP Goal  Goal: SLP Goal  Speech Language Pathology Goals  Goals expected to be met by 6/13  1. Pt will tolerate puree diet with thin liquids and no overt s/s of aspiration.   2. Pt will tolerate soft solid trials to determine if /when appropriate for diet upgrade.  3. Pt will orient x4 with min cues, to improve functional memory.  4.  Pt will follow 2 step command with 90% accuracy, to improve receptive language skills.   5. Pt will respond to simple problem solving tasks with 70% accuracy, given mod cues, to improve functional problem solving skills.   6.  Pt will list 10 items in one minute on word fluency task, to improve thought organization skills.   7.  Further evaluate reading/writing/visual-spatial skills.         Goals due ton 6/12  1.Pt will participate in ongoing swallow assessment-met  2 pt will participate in sle -met     Pt making progress toward goals. ST with new diet recommendations for Mechanical Soft / Thin Liquids given adherence to strict aspiration precautions.   Marcy Yuan M.S., CCC-SLP  Speech Language Pathologist  [phone]  06/08/2018

## 2018-06-08 NOTE — PROGRESS NOTES
[facility]  Vascular Neurology  Comprehensive Stroke Center  Progress Note    Assessment/Plan:     * Right MCA ischemic stroke, aborted by t-PA    67 year old female with left sided weakness, gaze preference, and speech diffculties found down at home, intubated and sedated prior to transfer for potential thrombectomy after tpa treatment.     - CTA: negative for LVO   - MRI: negative for diffusion restrictions   - Tele: Reviewed / Tachycardia +, No concerns for Afib / RVR   - Echo: + LA enlargements     - Unclear atherothrombotic vs embolic aborted by t-PA    Antithrombotics for secondary stroke prevention: Dcd apixaban for negative tele for Afib on review / Continue ASA    Statins for secondary stroke prevention and hyperlipidemia, if present: Atorvastatin 40 mg OD    Aggressive risk factor modification: HTN, DM, HLD     Rehab efforts: PT/OT/SLP to evaluate and treat - Recommendations [facility]      Diagnostics ordered/pending: none    VTE prophylaxis: Heparin     BP parameters: Infarct: Post tPA, SBP <180        Dyslipidemia    Takes fenofibrate at home  No statin on home meds    LDL 51  - Initiated atorvastatin 20 mg         Obesity due to excess calories    Stroke risk factor  counseling +          Essential hypertension    Stroke risk factor  SBP < 180 post tpa     - On BB         Diabetes    Stroke risk factor, SSI while inpatient  On metformin at home          COPD (chronic obstructive pulmonary disease)    Stating well on 2L NC  - scheduled duo-nebs   - pleural effusion right mild / likely cardiogenic / initiate lasix         Neck pain    Wearing c collar     MRI neck significantly motion limited but no abnormality seen  - F/u xray cervical spine negative  - ? Compression # in T3 / No active concerns   - F/u ortho op basis / No active management / conservative         Other specified hypothyroidism    Continue synthroid        Left-sided weakness    - resolved         Depression    -  continue elavil                        6/5/18 - improvement overnight, extubated. No noted weakness or speech difficulties   6/6/18 discussed HPI with patient's son who stated that when he found his mother down, she had BL weakness and was moving her eyes left and right. Patient denies having unilateral weakness after falling.   6/7-8: NAEON. No concerns for worsening NIHSS.     STROKE DOCUMENTATION   Acute Stroke Times   Last Known Normal Date: 06/04/18  Last Known Normal Time: 1700  Symptom Onset Date: 06/04/18  Symptom Onset Time: 1720  Stroke Team Called Date: 06/04/18  Stroke Team Arrival Date: 06/04/18  Stroke Team Arrival Time:  (arrived in ED at 8:07 pm, PA awaiting arrival)  CT Interpretation Time:  (initial ct read by phoebe SYKES )    NIH Scale:  1a. Level Of Consciousness: 0-->Alert: keenly responsive  1b. LOC Questions: 0-->Answers both questions correctly  1c. LOC Commands: 0-->Performs both tasks correctly  2. Best Gaze: 0-->Normal  3. Visual: 0-->No visual loss  4. Facial Palsy: 0-->Normal symmetrical movements  5a. Motor Arm, Left: 0-->No drift: limb holds 90 (or 45) degrees for full 10 secs  5b. Motor Arm, Right: 0-->No drift: limb holds 90 (or 45) degrees for full 10 secs  6a. Motor Leg, Left: 0-->No drift: leg holds 30 degree position for full 5 secs  6b. Motor Leg, Right: 0-->No drift: leg holds 30 degree position for full 5 secs  7. Limb Ataxia: 0-->Absent  8. Sensory: 0-->Normal: no sensory loss  9. Best Language: 0-->No aphasia: normal  10. Dysarthria: 0-->Normal  11. Extinction and Inattention (formerly Neglect): 0-->No abnormality  Total (NIH Stroke Scale): 0       Modified Rawlins Score: 1  [place] Coma Scale:    ABCD2 Score:    [id number] Score:   HAS -BLED Score:   ICH Score:   Hunt & Kothari Classification:      Hemorrhagic change of an Ischemic Stroke: Does this patient have an ischemic stroke with hemorrhagic changes? No     Neurologic Chief Complaint: Right MCA embolic stroke       Subjective:     Interval History: Patient is seen for follow-up neurological assessment and treatment recommendations:    NAEON. No concerns for worsening NIHSS.    Telemetry reviewed / no Afib noted / Dcd NOAC.     HPI, Past Medical, Family, and Social History remains the same as documented in the initial encounter.     Review of Systems   Constitutional: Negative for fever.   Musculoskeletal: Positive for arthralgias and neck pain.        Back and rib pain   Neurological: Negative for speech difficulty and weakness.     Scheduled Meds:   albuterol-ipratropium  3 mL Nebulization Q8H    amitriptyline  50 mg Oral QHS    [START ON 6/9/2018] aspirin  81 mg Oral Daily    atorvastatin  20 mg Oral Daily    baclofen  5 mg Oral QID    busPIRone  10 mg Oral BID    levothyroxine  175 mcg Oral Daily    lidocaine  1 patch Transdermal Q24H    metoprolol tartrate  25 mg Oral Q8H    ramelteon  8 mg Oral QHS    senna-docusate 8.6-50 mg  2 tablet Oral BID    sodium chloride 0.9%  3 mL Intravenous Q8H     Continuous Infusions:    PRN Meds:acetaminophen, dextrose 50%, dextrose 50%, glucagon (human recombinant), glucose, HYDROcodone-acetaminophen, insulin aspart U-100, ondansetron    Objective:     Vital Signs (Most Recent):  Temp: 97.6 °F (36.4 °C) (06/08/18 1625)  Pulse: 98 (06/08/18 1625)  Resp: 18 (06/08/18 1625)  BP: 137/63 (06/08/18 1625)  SpO2: (!) 90 % (06/08/18 1625)  BP Location: Right arm    Vital Signs Range (Last 24H):  Temp:  [97.6 °F (36.4 °C)-98.9 °F (37.2 °C)]   Pulse:  []   Resp:  [17-20]   BP: (121-164)/(61-91)   SpO2:  [90 %-93 %]   BP Location: Right arm    Physical Exam   Constitutional: She is oriented to person, place, and time. She appears well-developed and well-nourished.   HENT:   Head: Normocephalic and atraumatic.   Eyes: EOM are normal. Pupils are equal, round, and reactive to light.   Neck:   Wearing c collar - MRI ordered to clear   Cardiovascular: Normal rate.    Pulmonary/Chest:  Effort normal.   Musculoskeletal: Normal range of motion.   Neurological: She is alert and oriented to person, place, and time.   Skin: Skin is warm.   Nursing note and vitals reviewed.      Neurological Exam:   LOC: alert  Attention Span: Good   Language: No aphasia  Articulation: No dysarthria  Orientation: Person, Place, Time   Visual Fields: Full  EOM (CN III, IV, VI): Full/intact  Facial Movement (CN VII): Symmetric facial expression    Motor: Arm left  Normal 5/5  Leg left  Normal 5/5  Arm right  Normal 5/5  Leg right Normal 5/5  Sensation: Intact to light touch, temperature and vibration  Tone: Normal tone throughout    Laboratory:  CMP:     Recent Labs  Lab 06/08/18  0434   CALCIUM 7.8*   ALBUMIN 2.3*   PROT 5.8*      K 5.1   CO2 22*      BUN 15   CREATININE 0.9   ALKPHOS 93   ALT 12   AST 13   BILITOT 0.4     CBC:     Recent Labs  Lab 06/08/18 0434   WBC 7.47   RBC 2.83*   HGB 7.9*   HCT 24.8*      MCV 88   MCH 27.9   MCHC 31.9*     Lipid Panel:     Recent Labs  Lab 06/04/18 2049   CHOL 109*   LDLCALC 51.8*   HDL 37*   TRIG 101     Coagulation:     Recent Labs  Lab 06/04/18 2049   INR 1.1   APTT <21.0     Platelet Aggregation Study: No results for input(s): PLTAGG, PLTAGINTERP, PLTAGREGLACO, ADPPLTAGGREG in the last 168 hours.  Hgb A1C:     Recent Labs  Lab 06/04/18 2049   HGBA1C 6.1*     TSH:     Recent Labs  Lab 06/04/18 2049   TSH <0.010*         Diagnostic Results:  CTA MP 6/4/18 - reviewed with Dr España in real time   Radiology read- Possible right frontal area of hypoattenuation with evaluation limited by motion artifact.  Additional left centrum semiovale area of hypoattenuation possibly a recent or remote infarction.    Mild luminal irregularity of the right proximal MCA as seen on series 5, image 1442.  No evidence of large vessel occlusion in the intracranial circulation.    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  The overall evaluation is difficult given motion  within this region.  Outpatient repeat CTA of the head is suggested for further evaluation.    No hemodynamically significant stenosis in the neck vessels.     MRI:   Noncontrast MRI of the brain demonstrates no acute hemorrhage or infarct.    Mild chronic microvascular ischemic changes in the supratentorial white matter.    Echo     CONCLUSIONS     1 - Normal left ventricular systolic function (EF 65-70%).     2 - Indeterminate LV diastolic function.     3 - Normal right ventricular systolic function .     4 - Pulmonary hypertension. The estimated PA systolic pressure is 51 mmHg.     5 - Trivial tricuspid regurgitation.     6 - Moderate left atrial enlargement.     7 - Concentric remodeling.     8 - No wall motion abnormalities.       Fito De Luna MD  Comprehensive Stroke Center  Department of Vascular Neurology   [facility]

## 2018-06-08 NOTE — PLAN OF CARE
Problem: Occupational Therapy Goal  Goal: Occupational Therapy Goal  Goals to be met by: 6/15/2018    Patient will increase functional independence with ADLs by performing:    UE Dressing with Contact Guard Assistance.  LE Dressing with Minimal Assistance.  Grooming while standing at sink with Contact Guard Assistance. MEt 6/8  Revised Grooming while standing at sink with SBA.   Toileting from toilet with Contact Guard Assistance for hygiene and clothing management.   Sitting at edge of bed x 15 minutes with Contact Guard Assistance. Met 6/7  Revised: sitting EOB x15 with SBA for dynamic sitting balance.   Supine to sit with Minimal Assistance. ( CGA)  Revised: supine to sit with supervision.   Toilet transfer to toilet with Minimal Assistance.      Outcome: Ongoing (interventions implemented as appropriate)  Continue with POC.   Shannon rabago OT  6/8/2018

## 2018-06-08 NOTE — PLAN OF CARE
JOCY sent clinical updates to OS for review.    Samantha Molina, KLEBER  [facility]  Ext. [phone]

## 2018-06-08 NOTE — PT/OT/SLP PROGRESS
Occupational Therapy   Treatment    Name: Olimpia Cardoza  MRN: [id number]  Admitting Diagnosis:  Embolic stroke involving right middle cerebral artery       Recommendations:     Discharge Recommendations: nursing facility, skilled ( SNF)  Discharge Equipment Recommendations:  none  Barriers to discharge:  None    Subjective     Communicated with: RN prior to session.  Pt agreeable to participate with therapy. Two sons in room upon arrival however stepped out during session.     Pain/Comfort:  · Pain Rating 1:  (pain on R side of abdomen with movement-- did not rate)  · Pain Addressed 1: Pre-medicate for activity, Reposition, Cessation of Activity    Patients cultural, spiritual, Restorationist conflicts given the current situation: None reported    Objective:     Patient found with: bed alarm, telemetry, oxygen    General Precautions: Standard, aspiration, fall   Orthopedic Precautions:N/A   Braces: N/A     Occupational Performance:    Bed Mobility:    · Patient completed Rolling/Turning to Right with contact guard assistance  · Patient completed Supine to Sit with contact guard assistance     Functional Mobility/Transfers:  · Patient completed Sit <> Stand Transfer with contact guard assistance  with  rolling walker   · Patient completed Bed <> Chair Transfer using Stand Pivot technique with contact guard assistance with rolling walker  · Functional Mobility: Pt complete functional mobility from EOB < bedside sink < Chair with CGA and Rw for balance and safety; Pt demo's poor safety awareness during session requiring cues for safety     Activities of Daily Living:  · Grooming: contact guard assistance to wash face, apply chapstick, and wash hands while standing at sink; she required one sit down break between self care tasks 2/2 feeling tired   · UB Dressing: minimum assistance to nikki gown like jacket while seated EOB  · LB Dressing: minimum assistance to nikki B socks while seated EOB    Patient left up in chair with  all lines intact, call button in reach and RN notified    [facility] 6 Click:  [facility] Total Score: 19    Treatment & Education:  -Pt edu on OT role/POC  -Importance of OOB activity with staff assistance-- UIC during each meal   -Safety during functional t/f and mobility  - White board updated  - Multiple self care tasks completed-- as noted above   - Pt completed BUE/BLE AROM exercises while seated UIC including: 1x15 reps in shoulder flexion, elbow flexion/extension, chest press; hip flexion, knee extension, and ankle pumps. Pt tolerated well with min rest breaks between each exercise.   - Edu family on BUE HEP to complete 3x daily-- verbalized understanding   Education:    Assessment:     Olimpia Cardoza is a 67 y.o. female with a medical diagnosis of Embolic stroke involving right middle cerebral artery.  She presents alert and motivated to participate with therapy. She is progressing well with goals.  Performance deficits affecting function are weakness, impaired functional mobilty, impaired endurance, gait instability, decreased upper extremity function, decreased lower extremity function, impaired self care skills, impaired balance, visual deficits, orthopedic precautions, pain, decreased safety awareness.  Pt would benefit from  SNF    Rehab Prognosis:  Good; patient would benefit from acute skilled OT services to address these deficits and reach maximum level of function.       Plan:     Patient to be seen 4 x/week to address the above listed problems via self-care/home management, therapeutic activities, therapeutic exercises, neuromuscular re-education  · Plan of Care Expires: 07/05/18  · Plan of Care Reviewed with: patient, son, daughter    This Plan of care has been discussed with the patient who was involved in its development and understands and is in agreement with the identified goals and treatment plan    GOALS:    Occupational Therapy Goals        Problem: Occupational Therapy Goal    Goal Priority  Disciplines Outcome Interventions   Occupational Therapy Goal     OT, PT/OT Ongoing (interventions implemented as appropriate)    Description:  Goals to be met by: 6/15/2018    Patient will increase functional independence with ADLs by performing:    UE Dressing with Contact Guard Assistance.  LE Dressing with Minimal Assistance.  Grooming while standing at sink with Contact Guard Assistance. MEt 6/8  Revised Grooming while standing at sink with SBA.   Toileting from toilet with Contact Guard Assistance for hygiene and clothing management.   Sitting at edge of bed x 15 minutes with Contact Guard Assistance. Met 6/7  Revised: sitting EOB x15 with SBA for dynamic sitting balance.   Supine to sit with Minimal Assistance. ( CGA)  Revised: supine to sit with supervision.   Toilet transfer to toilet with Minimal Assistance.                        Time Tracking:     OT Date of Treatment: 06/08/18  OT Start Time: 1410  OT Stop Time: 1438  OT Total Time (min): 28 min    Billable Minutes:Self Care/Home Management 12  Therapeutic Exercise 16    Shannon rabago OT  6/8/2018

## 2018-06-08 NOTE — ASSESSMENT & PLAN NOTE
Wearing c collar     MRI neck significantly motion limited but no abnormality seen  - F/u xray cervical spine negative  - ? Compression # in T3  - F/u ortho op basis / No active management / conservative

## 2018-06-08 NOTE — PT/OT/SLP PROGRESS
"Speech Language Pathology Treatment    Patient Name:  Olipmia Cardoza   MRN:  [id number]  Admitting Diagnosis: Embolic stroke involving right middle cerebral artery    Recommendations:                 General Recommendations:  Dysphagia therapy, Speech/language therapy and Cognitive-linguistic therapy  Diet recommendations:  Mechanical soft, No Rice, Chopped meat, Liquid Diet Level: Thin (NO STRAWS)   Aspiration Precautions:   · 1 bite/sip at a time  · Alternating bites/sips  · Avoid talking while eating  · Check for pocketing/oral residue  · Lingual and/or finger sweep intermittently t/o meal  · Double swallow with each bite/sip  · Meds whole 1 at a time  · Monitor for s/s of aspiration  · No straws  · Remain upright 30 minutes post meal  · Small bites/sips and Strict aspiration precautions   · Finish meal with liquids to ensure full oral cavity clearance.   General Precautions: Standard, aspiration, fall, seizure  Communication strategies:  provide increased time to answer    Subjective     "My side hurts so bad when I have to cough". (MD entered room during session, Pt reported pain)  Daughter present t/o session.   Daughter with reports of increased congestion this day, and reported "She just has always had such bad lung issues".   Intermittent/inconsistent coughing noted t/o entire session.     Pain/Comfort:  · Pain Rating 1: 10/10  · Pain Rating Post-Intervention 1: 0/10    Objective:     Has the patient been evaluated by SLP for swallowing?   Yes  Keep patient NPO? No   Current Respiratory Status: nasal cannula      Cog-Ling Tx: Pt oriented x3; reported year to be "2019". Upon completion of session, Pt again stated year "2019". Daughter reported "yeah she keeps saying that).  2 step commands followed with 100% acc indep. 3 step commands followed with 80% acc indep, improving to 100% acc given min A.  Simple problem solving/reasoning (taks for Pt to provide 2 similarity/difference of basic items) completed with " "25% acc indep, improving to 100% given mod-max A. Pt consistently demonstrated difficulty providing 2nd similarity/difference of item. Decreased response appropriateness/accuracy also noted during this time evidenced by Pt reported "They both go" when given task stimuli of " car and truck" comparison.  Pt with overt coughing x2 during cog-ling portion of Tx with facial grimace and hand on stomach. MD aware.   Dysphagia Tx:   Pt tolerated puree tsp x3 and thin liquid trials via cup sips x4 and straw sips x5 with no overt s/s airway compromise. Regular solid trial via 1/2 cracker revealed mildly increased oral prep/mastication time with minimal lingual residue. Though given cue/education, Pt demonstrating lingual sweep indep.  Given Pt increased congestion noted this day, ST with recs for no straws and cup sips thin liquids only at this time. Pt and family member verbalized understanding. It should be noted that Pt with intermittent and inconsistent coughing t/o entire session, even prior to any PO trials during cog-ling portion of assessment and no increased cough frequency during PO trials. Pt appropriate for diet advancement to [facility] soft given adherence to strict aspiration precautions listed above.   ST provided skilled education re: ST POC, diet recs, strict aspiration precautions, education on lingual/finger sweep and rationale, no straws. Pt and family member verbalized understanding.   MD team updated of new diet recs during rounds.     Assessment:     Olimpia Cardoza is a 67 y.o. female with an SLP diagnosis of Dysphagia and Cognitive-Linguistic Impairment.  ST with new diet recs: Mechanical Soft / Thin liquids (NO STRAWS). Cont ST POC.     Goals:    SLP Goals        Problem: SLP Goal    Goal Priority Disciplines Outcome   SLP Goal     SLP Ongoing (interventions implemented as appropriate)   Description:  Speech Language Pathology Goals  Goals expected to be met by 6/13  1. Pt will tolerate puree diet with " thin liquids and no overt s/s of aspiration.   2. Pt will tolerate soft solid trials to determine if /when appropriate for diet upgrade.  3. Pt will orient x4 with min cues, to improve functional memory.  4.  Pt will follow 2 step command with 90% accuracy, to improve receptive language skills.   5. Pt will respond to simple problem solving tasks with 70% accuracy, given mod cues, to improve functional problem solving skills.   6.  Pt will list 10 items in one minute on word fluency task, to improve thought organization skills.   7.  Further evaluate reading/writing/visual-spatial skills.         Goals due ton 6/12  1.Pt will participate in ongoing swallow assessment-met  2 pt will participate in sle -met                    Plan:     · Patient to be seen:  5 x/week   · Plan of Care expires:     · Plan of Care reviewed with:  patient, daughter   · SLP Follow-Up:  Yes       Discharge recommendations:  nursing facility, skilled       Time Tracking:     SLP Treatment Date:   06/08/18  Speech Start Time:  0828  Speech Stop Time:  0845     Speech Total Time (min):  17 min    Billable Minutes: Speech Therapy Individual 9 and Treatment Swallowing Dysfunction 8    Marcy Yuan M.S., CCC-SLP  Speech Language Pathologist  [phone]  06/08/2018

## 2018-06-09 ENCOUNTER — HOSPITAL ENCOUNTER (INPATIENT)
Facility: HOSPITAL | Age: 68
LOS: 6 days | Discharge: HOME HEALTH | DRG: 057 | End: 2018-06-15
Attending: INTERNAL MEDICINE | Admitting: INTERNAL MEDICINE
Payer: MEDICARE

## 2018-06-09 VITALS
TEMPERATURE: 98 F | BODY MASS INDEX: 31.3 KG/M2 | OXYGEN SATURATION: 97 % | HEART RATE: 88 BPM | HEIGHT: 61 IN | RESPIRATION RATE: 18 BRPM | SYSTOLIC BLOOD PRESSURE: 129 MMHG | DIASTOLIC BLOOD PRESSURE: 68 MMHG | WEIGHT: 165.81 LBS

## 2018-06-09 DIAGNOSIS — I63.411 EMBOLIC STROKE INVOLVING RIGHT MIDDLE CEREBRAL ARTERY: ICD-10-CM

## 2018-06-09 DIAGNOSIS — I63.9 CVA (CEREBRAL VASCULAR ACCIDENT): ICD-10-CM

## 2018-06-09 DIAGNOSIS — I10 ESSENTIAL HYPERTENSION: Primary | ICD-10-CM

## 2018-06-09 DIAGNOSIS — I50.9 CHRONIC CONGESTIVE HEART FAILURE, UNSPECIFIED HEART FAILURE TYPE: ICD-10-CM

## 2018-06-09 DIAGNOSIS — J44.9 CHRONIC OBSTRUCTIVE PULMONARY DISEASE, UNSPECIFIED COPD TYPE: ICD-10-CM

## 2018-06-09 DIAGNOSIS — J96.11 CHRONIC RESPIRATORY FAILURE WITH HYPOXIA: ICD-10-CM

## 2018-06-09 DIAGNOSIS — R16.0 HEPATOMEGALY: ICD-10-CM

## 2018-06-09 DIAGNOSIS — E78.5 DYSLIPIDEMIA: ICD-10-CM

## 2018-06-09 DIAGNOSIS — S20.211D HEMATOMA OF RIGHT CHEST WALL, SUBSEQUENT ENCOUNTER: ICD-10-CM

## 2018-06-09 DIAGNOSIS — E03.8 OTHER SPECIFIED HYPOTHYROIDISM: ICD-10-CM

## 2018-06-09 DIAGNOSIS — F32.A DEPRESSION, UNSPECIFIED DEPRESSION TYPE: ICD-10-CM

## 2018-06-09 DIAGNOSIS — S22.41XD CLOSED FRACTURE OF MULTIPLE RIBS OF RIGHT SIDE WITH ROUTINE HEALING, SUBSEQUENT ENCOUNTER: ICD-10-CM

## 2018-06-09 DIAGNOSIS — E11.9 TYPE 2 DIABETES MELLITUS, WITHOUT LONG-TERM CURRENT USE OF INSULIN: ICD-10-CM

## 2018-06-09 LAB
ALBUMIN SERPL BCP-MCNC: 2.2 G/DL
ALP SERPL-CCNC: 95 U/L
ALT SERPL W/O P-5'-P-CCNC: 11 U/L
ANION GAP SERPL CALC-SCNC: 9 MMOL/L
AST SERPL-CCNC: 12 U/L
BASOPHILS # BLD AUTO: 0.02 K/UL
BASOPHILS NFR BLD: 0.3 %
BILIRUB SERPL-MCNC: 0.4 MG/DL
BUN SERPL-MCNC: 14 MG/DL
CALCIUM SERPL-MCNC: 7.7 MG/DL
CHLORIDE SERPL-SCNC: 106 MMOL/L
CO2 SERPL-SCNC: 25 MMOL/L
CREAT SERPL-MCNC: 0.9 MG/DL
DIFFERENTIAL METHOD: ABNORMAL
EOSINOPHIL # BLD AUTO: 0.1 K/UL
EOSINOPHIL NFR BLD: 1.7 %
ERYTHROCYTE [DISTWIDTH] IN BLOOD BY AUTOMATED COUNT: 15.2 %
EST. GFR  (AFRICAN AMERICAN): >60 ML/MIN/1.73 M^2
EST. GFR  (NON AFRICAN AMERICAN): >60 ML/MIN/1.73 M^2
GLUCOSE SERPL-MCNC: 124 MG/DL
HCT VFR BLD AUTO: 26.5 %
HGB BLD-MCNC: 8.5 G/DL
IMM GRANULOCYTES # BLD AUTO: 0.09 K/UL
IMM GRANULOCYTES NFR BLD AUTO: 1.5 %
LYMPHOCYTES # BLD AUTO: 1.3 K/UL
LYMPHOCYTES NFR BLD: 22.1 %
MAGNESIUM SERPL-MCNC: 1 MG/DL
MCH RBC QN AUTO: 28.1 PG
MCHC RBC AUTO-ENTMCNC: 32.1 G/DL
MCV RBC AUTO: 88 FL
MONOCYTES # BLD AUTO: 0.7 K/UL
MONOCYTES NFR BLD: 11.4 %
NEUTROPHILS # BLD AUTO: 3.8 K/UL
NEUTROPHILS NFR BLD: 63 %
NRBC BLD-RTO: 0 /100 WBC
PHOSPHATE SERPL-MCNC: 2.2 MG/DL
PLATELET # BLD AUTO: 244 K/UL
PMV BLD AUTO: 9.7 FL
POCT GLUCOSE: 128 MG/DL (ref 70–110)
POCT GLUCOSE: 137 MG/DL (ref 70–110)
POCT GLUCOSE: 150 MG/DL (ref 70–110)
POTASSIUM SERPL-SCNC: 4.5 MMOL/L
PROT SERPL-MCNC: 5.7 G/DL
RBC # BLD AUTO: 3.03 M/UL
SODIUM SERPL-SCNC: 140 MMOL/L
WBC # BLD AUTO: 5.97 K/UL

## 2018-06-09 PROCEDURE — 25000003 PHARM REV CODE 250: Performed by: HOSPITALIST

## 2018-06-09 PROCEDURE — 94640 AIRWAY INHALATION TREATMENT: CPT

## 2018-06-09 PROCEDURE — 63600175 PHARM REV CODE 636 W HCPCS: Performed by: PHYSICIAN ASSISTANT

## 2018-06-09 PROCEDURE — 36415 COLL VENOUS BLD VENIPUNCTURE: CPT

## 2018-06-09 PROCEDURE — 97116 GAIT TRAINING THERAPY: CPT

## 2018-06-09 PROCEDURE — 25000003 PHARM REV CODE 250: Performed by: PSYCHIATRY & NEUROLOGY

## 2018-06-09 PROCEDURE — 80053 COMPREHEN METABOLIC PANEL: CPT

## 2018-06-09 PROCEDURE — 25000003 PHARM REV CODE 250: Performed by: NURSE PRACTITIONER

## 2018-06-09 PROCEDURE — 25000242 PHARM REV CODE 250 ALT 637 W/ HCPCS: Performed by: PHYSICIAN ASSISTANT

## 2018-06-09 PROCEDURE — 27000221 HC OXYGEN, UP TO 24 HOURS

## 2018-06-09 PROCEDURE — A4216 STERILE WATER/SALINE, 10 ML: HCPCS | Performed by: NURSE PRACTITIONER

## 2018-06-09 PROCEDURE — 25000003 PHARM REV CODE 250: Performed by: STUDENT IN AN ORGANIZED HEALTH CARE EDUCATION/TRAINING PROGRAM

## 2018-06-09 PROCEDURE — 83735 ASSAY OF MAGNESIUM: CPT

## 2018-06-09 PROCEDURE — 85025 COMPLETE CBC W/AUTO DIFF WBC: CPT

## 2018-06-09 PROCEDURE — 94761 N-INVAS EAR/PLS OXIMETRY MLT: CPT

## 2018-06-09 PROCEDURE — 25000242 PHARM REV CODE 250 ALT 637 W/ HCPCS: Performed by: STUDENT IN AN ORGANIZED HEALTH CARE EDUCATION/TRAINING PROGRAM

## 2018-06-09 PROCEDURE — 84100 ASSAY OF PHOSPHORUS: CPT

## 2018-06-09 PROCEDURE — 63600175 PHARM REV CODE 636 W HCPCS: Performed by: STUDENT IN AN ORGANIZED HEALTH CARE EDUCATION/TRAINING PROGRAM

## 2018-06-09 PROCEDURE — 11000004 HC SNF PRIVATE

## 2018-06-09 PROCEDURE — 99233 SBSQ HOSP IP/OBS HIGH 50: CPT | Mod: GC,,, | Performed by: PSYCHIATRY & NEUROLOGY

## 2018-06-09 PROCEDURE — 25000003 PHARM REV CODE 250: Performed by: PHYSICIAN ASSISTANT

## 2018-06-09 RX ORDER — ATORVASTATIN CALCIUM 20 MG/1
20 TABLET, FILM COATED ORAL DAILY
Status: CANCELLED | OUTPATIENT
Start: 2018-06-10

## 2018-06-09 RX ORDER — SODIUM CHLORIDE 0.9 % (FLUSH) 0.9 %
3 SYRINGE (ML) INJECTION EVERY 8 HOURS
Status: DISCONTINUED | OUTPATIENT
Start: 2018-06-09 | End: 2018-06-13

## 2018-06-09 RX ORDER — ATORVASTATIN CALCIUM 20 MG/1
20 TABLET, FILM COATED ORAL DAILY
Status: DISCONTINUED | OUTPATIENT
Start: 2018-06-10 | End: 2018-06-15 | Stop reason: HOSPADM

## 2018-06-09 RX ORDER — GLUCAGON 1 MG
1 KIT INJECTION
Status: DISCONTINUED | OUTPATIENT
Start: 2018-06-09 | End: 2018-06-15 | Stop reason: HOSPADM

## 2018-06-09 RX ORDER — AMITRIPTYLINE HYDROCHLORIDE 50 MG/1
50 TABLET, FILM COATED ORAL NIGHTLY
Status: CANCELLED | OUTPATIENT
Start: 2018-06-09

## 2018-06-09 RX ORDER — SODIUM CHLORIDE 0.9 % (FLUSH) 0.9 %
3 SYRINGE (ML) INJECTION EVERY 8 HOURS
Status: CANCELLED | OUTPATIENT
Start: 2018-06-09

## 2018-06-09 RX ORDER — AMOXICILLIN 250 MG
1 CAPSULE ORAL 2 TIMES DAILY
Status: DISCONTINUED | OUTPATIENT
Start: 2018-06-09 | End: 2018-06-09

## 2018-06-09 RX ORDER — ACETAMINOPHEN 325 MG/1
650 TABLET ORAL EVERY 6 HOURS PRN
Status: CANCELLED | OUTPATIENT
Start: 2018-06-09

## 2018-06-09 RX ORDER — CALCIUM CARBONATE 200(500)MG
500 TABLET,CHEWABLE ORAL 2 TIMES DAILY PRN
Status: CANCELLED | OUTPATIENT
Start: 2018-06-09

## 2018-06-09 RX ORDER — HYDROCODONE BITARTRATE AND ACETAMINOPHEN 5; 325 MG/1; MG/1
1 TABLET ORAL EVERY 6 HOURS PRN
Status: DISCONTINUED | OUTPATIENT
Start: 2018-06-09 | End: 2018-06-15 | Stop reason: HOSPADM

## 2018-06-09 RX ORDER — INSULIN ASPART 100 [IU]/ML
1-10 INJECTION, SOLUTION INTRAVENOUS; SUBCUTANEOUS EVERY 6 HOURS PRN
Status: CANCELLED | OUTPATIENT
Start: 2018-06-09

## 2018-06-09 RX ORDER — CALCIUM CARBONATE 200(500)MG
500 TABLET,CHEWABLE ORAL 2 TIMES DAILY PRN
Status: DISCONTINUED | OUTPATIENT
Start: 2018-06-09 | End: 2018-06-15 | Stop reason: HOSPADM

## 2018-06-09 RX ORDER — AMOXICILLIN 250 MG
2 CAPSULE ORAL 2 TIMES DAILY
Status: CANCELLED | OUTPATIENT
Start: 2018-06-09

## 2018-06-09 RX ORDER — RAMELTEON 8 MG/1
8 TABLET ORAL NIGHTLY
Status: DISCONTINUED | OUTPATIENT
Start: 2018-06-09 | End: 2018-06-15 | Stop reason: HOSPADM

## 2018-06-09 RX ORDER — BUSPIRONE HYDROCHLORIDE 10 MG/1
10 TABLET ORAL 2 TIMES DAILY
Status: CANCELLED | OUTPATIENT
Start: 2018-06-09

## 2018-06-09 RX ORDER — METOPROLOL TARTRATE 25 MG/1
25 TABLET, FILM COATED ORAL EVERY 8 HOURS
Status: DISCONTINUED | OUTPATIENT
Start: 2018-06-09 | End: 2018-06-15 | Stop reason: HOSPADM

## 2018-06-09 RX ORDER — IBUPROFEN 200 MG
24 TABLET ORAL
Status: CANCELLED | OUTPATIENT
Start: 2018-06-09

## 2018-06-09 RX ORDER — HEPARIN SODIUM 5000 [USP'U]/ML
5000 INJECTION, SOLUTION INTRAVENOUS; SUBCUTANEOUS EVERY 8 HOURS
Status: CANCELLED | OUTPATIENT
Start: 2018-06-09

## 2018-06-09 RX ORDER — BUSPIRONE HYDROCHLORIDE 10 MG/1
10 TABLET ORAL 2 TIMES DAILY
Status: DISCONTINUED | OUTPATIENT
Start: 2018-06-09 | End: 2018-06-11

## 2018-06-09 RX ORDER — IBUPROFEN 200 MG
24 TABLET ORAL
Status: DISCONTINUED | OUTPATIENT
Start: 2018-06-09 | End: 2018-06-15 | Stop reason: HOSPADM

## 2018-06-09 RX ORDER — GLUCAGON 1 MG
1 KIT INJECTION
Status: CANCELLED | OUTPATIENT
Start: 2018-06-09

## 2018-06-09 RX ORDER — METOPROLOL TARTRATE 25 MG/1
25 TABLET, FILM COATED ORAL EVERY 8 HOURS
Status: CANCELLED | OUTPATIENT
Start: 2018-06-09

## 2018-06-09 RX ORDER — RAMELTEON 8 MG/1
8 TABLET ORAL NIGHTLY
Status: CANCELLED | OUTPATIENT
Start: 2018-06-09

## 2018-06-09 RX ORDER — IPRATROPIUM BROMIDE AND ALBUTEROL SULFATE 2.5; .5 MG/3ML; MG/3ML
3 SOLUTION RESPIRATORY (INHALATION) EVERY 8 HOURS
Status: DISCONTINUED | OUTPATIENT
Start: 2018-06-09 | End: 2018-06-15 | Stop reason: HOSPADM

## 2018-06-09 RX ORDER — HEPARIN SODIUM 5000 [USP'U]/ML
5000 INJECTION, SOLUTION INTRAVENOUS; SUBCUTANEOUS EVERY 8 HOURS
Status: DISCONTINUED | OUTPATIENT
Start: 2018-06-09 | End: 2018-06-15 | Stop reason: HOSPADM

## 2018-06-09 RX ORDER — RAMELTEON 8 MG/1
8 TABLET ORAL NIGHTLY PRN
Status: DISCONTINUED | OUTPATIENT
Start: 2018-06-09 | End: 2018-06-15 | Stop reason: HOSPADM

## 2018-06-09 RX ORDER — LIDOCAINE 50 MG/G
1 PATCH TOPICAL
Status: DISCONTINUED | OUTPATIENT
Start: 2018-06-10 | End: 2018-06-15 | Stop reason: HOSPADM

## 2018-06-09 RX ORDER — ACETAMINOPHEN 325 MG/1
650 TABLET ORAL EVERY 6 HOURS PRN
Status: DISCONTINUED | OUTPATIENT
Start: 2018-06-09 | End: 2018-06-09

## 2018-06-09 RX ORDER — IPRATROPIUM BROMIDE AND ALBUTEROL SULFATE 2.5; .5 MG/3ML; MG/3ML
3 SOLUTION RESPIRATORY (INHALATION) EVERY 8 HOURS
Status: CANCELLED | OUTPATIENT
Start: 2018-06-09

## 2018-06-09 RX ORDER — FUROSEMIDE 20 MG/1
20 TABLET ORAL 2 TIMES DAILY
Status: CANCELLED | OUTPATIENT
Start: 2018-06-09

## 2018-06-09 RX ORDER — NAPROXEN SODIUM 220 MG/1
81 TABLET, FILM COATED ORAL DAILY
Status: DISCONTINUED | OUTPATIENT
Start: 2018-06-10 | End: 2018-06-15 | Stop reason: HOSPADM

## 2018-06-09 RX ORDER — HYDROCODONE BITARTRATE AND ACETAMINOPHEN 5; 325 MG/1; MG/1
1 TABLET ORAL EVERY 6 HOURS PRN
Status: CANCELLED | OUTPATIENT
Start: 2018-06-09

## 2018-06-09 RX ORDER — ONDANSETRON 8 MG/1
8 TABLET, ORALLY DISINTEGRATING ORAL EVERY 8 HOURS PRN
Status: DISCONTINUED | OUTPATIENT
Start: 2018-06-09 | End: 2018-06-15 | Stop reason: HOSPADM

## 2018-06-09 RX ORDER — LIDOCAINE 50 MG/G
1 PATCH TOPICAL
Status: CANCELLED | OUTPATIENT
Start: 2018-06-10

## 2018-06-09 RX ORDER — AMOXICILLIN 250 MG
2 CAPSULE ORAL 2 TIMES DAILY
Status: DISCONTINUED | OUTPATIENT
Start: 2018-06-09 | End: 2018-06-10

## 2018-06-09 RX ORDER — NAPROXEN SODIUM 220 MG/1
81 TABLET, FILM COATED ORAL DAILY
Status: CANCELLED | OUTPATIENT
Start: 2018-06-10

## 2018-06-09 RX ORDER — AMOXICILLIN 250 MG
1 CAPSULE ORAL 2 TIMES DAILY
Status: CANCELLED | OUTPATIENT
Start: 2018-06-09

## 2018-06-09 RX ORDER — ONDANSETRON 8 MG/1
8 TABLET, ORALLY DISINTEGRATING ORAL EVERY 8 HOURS PRN
Status: CANCELLED | OUTPATIENT
Start: 2018-06-09

## 2018-06-09 RX ORDER — RAMELTEON 8 MG/1
8 TABLET ORAL NIGHTLY PRN
Status: CANCELLED | OUTPATIENT
Start: 2018-06-09

## 2018-06-09 RX ORDER — ACETAMINOPHEN 325 MG/1
650 TABLET ORAL EVERY 6 HOURS PRN
Status: DISCONTINUED | OUTPATIENT
Start: 2018-06-09 | End: 2018-06-15 | Stop reason: HOSPADM

## 2018-06-09 RX ORDER — INSULIN ASPART 100 [IU]/ML
1-10 INJECTION, SOLUTION INTRAVENOUS; SUBCUTANEOUS EVERY 6 HOURS PRN
Status: DISCONTINUED | OUTPATIENT
Start: 2018-06-09 | End: 2018-06-15 | Stop reason: HOSPADM

## 2018-06-09 RX ORDER — FUROSEMIDE 20 MG/1
20 TABLET ORAL 2 TIMES DAILY
Status: DISCONTINUED | OUTPATIENT
Start: 2018-06-09 | End: 2018-06-10

## 2018-06-09 RX ORDER — AMITRIPTYLINE HYDROCHLORIDE 50 MG/1
50 TABLET, FILM COATED ORAL NIGHTLY
Status: DISCONTINUED | OUTPATIENT
Start: 2018-06-09 | End: 2018-06-15 | Stop reason: HOSPADM

## 2018-06-09 RX ADMIN — HYDROCODONE BITARTRATE AND ACETAMINOPHEN 1 TABLET: 5; 325 TABLET ORAL at 11:06

## 2018-06-09 RX ADMIN — BACLOFEN 5 MG: 10 TABLET ORAL at 10:06

## 2018-06-09 RX ADMIN — BUSPIRONE HYDROCHLORIDE 10 MG: 10 TABLET ORAL at 09:06

## 2018-06-09 RX ADMIN — HEPARIN SODIUM 5000 UNITS: 5000 INJECTION, SOLUTION INTRAVENOUS; SUBCUTANEOUS at 05:06

## 2018-06-09 RX ADMIN — HEPARIN SODIUM 5000 UNITS: 5000 INJECTION, SOLUTION INTRAVENOUS; SUBCUTANEOUS at 02:06

## 2018-06-09 RX ADMIN — HEPARIN SODIUM 5000 UNITS: 5000 INJECTION, SOLUTION INTRAVENOUS; SUBCUTANEOUS at 09:06

## 2018-06-09 RX ADMIN — Medication 3 ML: at 02:06

## 2018-06-09 RX ADMIN — HYDROCODONE BITARTRATE AND ACETAMINOPHEN 1 TABLET: 5; 325 TABLET ORAL at 06:06

## 2018-06-09 RX ADMIN — RAMELTEON 8 MG: 8 TABLET, FILM COATED ORAL at 09:06

## 2018-06-09 RX ADMIN — METOPROLOL TARTRATE 25 MG: 25 TABLET ORAL at 09:06

## 2018-06-09 RX ADMIN — IPRATROPIUM BROMIDE AND ALBUTEROL SULFATE 3 ML: .5; 3 SOLUTION RESPIRATORY (INHALATION) at 10:06

## 2018-06-09 RX ADMIN — FUROSEMIDE 20 MG: 20 TABLET ORAL at 05:06

## 2018-06-09 RX ADMIN — FUROSEMIDE 20 MG: 20 TABLET ORAL at 10:06

## 2018-06-09 RX ADMIN — BACLOFEN 5 MG: 10 TABLET ORAL at 02:06

## 2018-06-09 RX ADMIN — IPRATROPIUM BROMIDE AND ALBUTEROL SULFATE 3 ML: .5; 3 SOLUTION RESPIRATORY (INHALATION) at 07:06

## 2018-06-09 RX ADMIN — ASPIRIN 81 MG CHEWABLE TABLET 81 MG: 81 TABLET CHEWABLE at 10:06

## 2018-06-09 RX ADMIN — Medication 3 ML: at 10:06

## 2018-06-09 RX ADMIN — LIDOCAINE 1 PATCH: 50 PATCH TOPICAL at 02:06

## 2018-06-09 RX ADMIN — METOPROLOL TARTRATE 25 MG: 25 TABLET ORAL at 02:06

## 2018-06-09 RX ADMIN — IPRATROPIUM BROMIDE AND ALBUTEROL SULFATE 3 ML: .5; 3 SOLUTION RESPIRATORY (INHALATION) at 12:06

## 2018-06-09 RX ADMIN — ATORVASTATIN CALCIUM 20 MG: 20 TABLET, FILM COATED ORAL at 10:06

## 2018-06-09 RX ADMIN — HYDROCODONE BITARTRATE AND ACETAMINOPHEN 1 TABLET: 5; 325 TABLET ORAL at 05:06

## 2018-06-09 RX ADMIN — BACLOFEN 5 MG: 10 TABLET ORAL at 09:06

## 2018-06-09 RX ADMIN — IPRATROPIUM BROMIDE AND ALBUTEROL SULFATE 3 ML: .5; 3 SOLUTION RESPIRATORY (INHALATION) at 04:06

## 2018-06-09 RX ADMIN — BUSPIRONE HYDROCHLORIDE 10 MG: 10 TABLET ORAL at 10:06

## 2018-06-09 RX ADMIN — METOPROLOL TARTRATE 25 MG: 25 TABLET ORAL at 05:06

## 2018-06-09 RX ADMIN — LEVOTHYROXINE SODIUM 175 MCG: 150 TABLET ORAL at 05:06

## 2018-06-09 RX ADMIN — AMITRIPTYLINE HYDROCHLORIDE 50 MG: 50 TABLET, FILM COATED ORAL at 09:06

## 2018-06-09 NOTE — ASSESSMENT & PLAN NOTE
67 year old female with left sided weakness, gaze preference, and speech diffculties found down at home, intubated and sedated prior to transfer for potential thrombectomy after tpa treatment.     - CTA: negative for LVO   - MRI: negative for diffusion restrictions   - Tele: Reviewed / Tachycardia +, No concerns for Afib / RVR   - Echo: + LA enlargements     - Unclear atherothrombotic vs embolic aborted by t-PA    Antithrombotics for secondary stroke prevention: Dcd apixaban for negative tele for Afib on review / Continue ASA     Statins for secondary stroke prevention and hyperlipidemia, if present: Atorvastatin 40 mg OD    Aggressive risk factor modification: HTN, DM, HLD     Rehab efforts: PT/OT/SLP to evaluate and treat - Recommendations [facility]      Diagnostics ordered/pending: none    VTE prophylaxis: Heparin     BP parameters: Infarct: Post tPA, SBP <180

## 2018-06-09 NOTE — PLAN OF CARE
Problem: Physical Therapy Goal  Goal: Physical Therapy Goal    Goals to be met by 6/15/2018    1. Pt will perform rolling to the R and L with min assistance. - MET  2. Pt will perform supine to sit from both sides of the bed with SBA using spinal precautions.  3. Pt will perform sit to supine with SBA using spinal precautions.  4. Pt will perform sit to stand transfers with CGA and no AD.  - MET  5. Pt will perform bed <> chair transfers with min assistance with or without AD. - MET  - Pt will perform bed <>C chair transfers with SBA  6. Pt will perform gait x 100 feet with CGA using RW.     Outcome: Ongoing (interventions implemented as appropriate)    Patient participated well in therapy.  POC and goals remain appropriate.  Please refer to progress note for functional mobility.     Pt is safe to perform transfers and gait with nursing using RW and oxygen in tow.  She requires assistance for oxygen management only.  Keep gait distances short d/t limited endurance.      Gabby Chappell, PT  6/9/2018  [phone] (pager)

## 2018-06-09 NOTE — PT/OT/SLP PROGRESS
"Physical Therapy Treatment    Patient Name:  Olimpia Cardoza   MRN:  [id number]    Recommendations:     Discharge Recommendations:  nursing facility, skilled (short stay)   Discharge Equipment Recommendations: none   Barriers to discharge: Decreased caregiver support (family not available 24 hr)      Plan:     During this hospitalization, patient to be seen 4 x/week to address the above listed problems via gait training, therapeutic activities, therapeutic exercises, neuromuscular re-education  · Plan of Care Expires:  07/06/18   Plan of Care Reviewed with: patient, son, daughter    Subjective     Communicated with RN prior to session.    Patient comments/goals: "Can we try walking to the bathroom.  I am not as SOB as yesterday"  Pain/Comfort:  · Pain Rating 1:  (R  side pain)  · Pain Addressed 1: Pre-medicate for activity  · Pain Rating Post-Intervention 1:  (R side pain)    Objective:     Patient found with: telemetry, oxygen, bed alarm     General Precautions: Standard, aspiration, fall, respiratory     Patient was found supine in bed with son at bedside.  She c/o R side pain and SOB but agreed to therapy.  Assistance required for bed mobility d/t R side chest/rib pain.  Pt ambulated to and from the bathroom, performed hand hygiene in standing, and ambulated in the pimentel with RW and oxygen in town.  5 minute rest break required d/t SOB between each activity. See below for details.     Functional Mobility:  Bed Mobility:   · Rolling Right: NT  · Rolling Left: NT  · Supine to Sit: min assistance to elevate trunk  · Sit to Supine: NT     Sitting Balance at Edge of Bed:  · Assistance Level Required: SBA     Transfers:   · Sit <> Stand Transfer:  SBA from bed x1, toilet x1, and chair x1   · Toilet transfer: SBA with RW     Gait:  Gait x 15 feet, 15 feet, 30 feet with contact guard assistance using RW, oxygen in tow.   · Gait limited by SOB, progressing to "dizziness"    Activity SPO2 on 2L HR   Rest  95% 105   Gait x 15 " feet 95% 115   Gait x 15 and stand 92% 106   Gait x 30 feet 91% 112   Rest x 5 minutes 95% 104   *5 minute rest break between each gait activity    Therapeutic Activities and Exercises:   PT educated patient and son on the importance of mobilization to improve endurance.  Educated patient to walk 3-4x/day with RW and nursing/PCT assist.  Communicated walking assistance and frequency with RN.     At the end of the session, the patient was left sitting up in chair with all lines intact, call bell in reach, VSS, son at bedside, and Rn notified.      AM-PAC 6 CLICK MOBILITY  Turning over in bed (including adjusting bedclothes, sheets and blankets)?: 4  Sitting down on and standing up from a chair with arms (e.g., wheelchair, bedside commode, etc.): 4  Moving from lying on back to sitting on the side of the bed?: 3  Moving to and from a bed to a chair (including a wheelchair)?: 3  Need to walk in hospital room?: 3  Climbing 3-5 steps with a railing?: 3  Total Score: 20     GOALS:    Physical Therapy Goals        Problem: Physical Therapy Goal    Goal Priority Disciplines Outcome Goal Variances Interventions   Physical Therapy Goal     PT/OT, PT Ongoing (interventions implemented as appropriate)     Description:    Goals to be met by 6/15/2018    1. Pt will perform rolling to the R and L with min assistance. - MET  2. Pt will perform supine to sit from both sides of the bed with SBA using spinal precautions.  3. Pt will perform sit to supine with SBA using spinal precautions.  4. Pt will perform sit to stand transfers with CGA and no AD.  - MET  5. Pt will perform bed <> chair transfers with min assistance with or without AD. - MET  - Pt will perform bed <>C chair transfers with SBA  6. Pt will perform gait x 100 feet with CGA using RW.                     Assessment:     Olimpia Cardoza is a 67 y.o. female admitted with a medical diagnosis of Embolic stroke involving right middle cerebral artery.  She continues to have  decreased endurance, but she was able to ambulate today with no oxygen desaturation on 2L.  She still requires additional skilled PT services to regain endurance and independence prior to returning home.  She is safe to ambulate short distances with nursing assist using RW.     She presents with the following impairments/functional limitations:  weakness, impaired functional mobilty, impaired cardiopulmonary response to activity, impaired endurance, gait instability, pain, impaired balance, impaired self care skills, orthopedic precautions.    Rehab Prognosis:  good; patient would benefit from acute skilled PT services to address these deficits and reach maximum level of function.      Recent Surgery: * No surgery found *        Time Tracking:     PT Received On: 06/09/18  PT Start Time: 0923     PT Stop Time: 0953  PT Total Time (min): 30 min     Billable Minutes: Gait Training 30    Treatment Type: Treatment  PT/PTA: PT         Gabby Chappell, PT  6/9/2018  [phone] (pager)

## 2018-06-09 NOTE — PLAN OF CARE
FRANCIS spoke with Nancy GUERRIER charge at [facility]. Patient is a possible transfer; Dr Rajan will review when she gets in. Will follow.    1:09pm - Patient has been approved for transfer to [facility].  Transportation arranged with [employer] wheelchair with ETA 3pm. Nurse notified and will call report to [phone].

## 2018-06-09 NOTE — SUBJECTIVE & OBJECTIVE
Neurologic Chief Complaint: Right MCA embolic stroke      Subjective:     Interval History: Patient is seen for follow-up neurological assessment and treatment recommendations:    rate better controlled. Back and rib pain remains- with hematoma and ecchymosis, improved with warm compresses    HPI, Past Medical, Family, and Social History remains the same as documented in the initial encounter.     Review of Systems   Constitutional: Negative for fever.   Musculoskeletal: Positive for arthralgias and neck pain.        Back and rib pain   Skin: Positive for color change.   Neurological: Negative for speech difficulty and weakness.     Scheduled Meds:   albuterol-ipratropium  3 mL Nebulization Q8H    amitriptyline  50 mg Oral QHS    aspirin  81 mg Oral Daily    atorvastatin  20 mg Oral Daily    baclofen  5 mg Oral QID    busPIRone  10 mg Oral BID    furosemide  20 mg Oral BID    heparin (porcine)  5,000 Units Subcutaneous Q8H    levothyroxine  175 mcg Oral Daily    lidocaine  1 patch Transdermal Q24H    metoprolol tartrate  25 mg Oral Q8H    ramelteon  8 mg Oral QHS    senna-docusate 8.6-50 mg  2 tablet Oral BID    sodium chloride 0.9%  3 mL Intravenous Q8H     Continuous Infusions:    PRN Meds:acetaminophen, dextrose 50%, dextrose 50%, glucagon (human recombinant), glucose, HYDROcodone-acetaminophen, insulin aspart U-100, ondansetron    Objective:     Vital Signs (Most Recent):  Temp: 98.3 °F (36.8 °C) (06/09/18 1109)  Pulse: 88 (06/09/18 1109)  Resp: 18 (06/09/18 1109)  BP: 129/68 (06/09/18 1109)  SpO2: 97 % (06/09/18 1109)  BP Location: Right arm    Vital Signs Range (Last 24H):  Temp:  [97.6 °F (36.4 °C)-99.1 °F (37.3 °C)]   Pulse:  []   Resp:  [16-22]   BP: (118-139)/(59-78)   SpO2:  [90 %-97 %]   BP Location: Right arm    Physical Exam   Constitutional: She is oriented to person, place, and time. She appears well-developed and well-nourished.   HENT:   Head: Normocephalic and atraumatic.    Eyes: EOM are normal. Pupils are equal, round, and reactive to light.   Cardiovascular: Normal rate.    Pulmonary/Chest: Effort normal.   Neurological: She is alert and oriented to person, place, and time.   Skin:   Ecchymosis and hematoma of right lateral back. Tender to palpation   Psychiatric: She has a normal mood and affect. Thought content normal.   Nursing note and vitals reviewed.      Neurological Exam:   LOC: alert  Attention Span: Good   Language: No aphasia  Articulation: No dysarthria  Orientation: Person, Place, Time   Visual Fields: Full  EOM (CN III, IV, VI): Full/intact  Facial Movement (CN VII): Symmetric facial expression    Motor: Arm left  Normal 5/5  Leg left  Normal 5/5  Arm right  Normal 5/5  Leg right Normal 5/5  Sensation: Intact to light touch, temperature and vibration  Tone: Normal tone throughout    Laboratory:  CMP:     Recent Labs  Lab 06/09/18 0419   CALCIUM 7.7*   ALBUMIN 2.2*   PROT 5.7*      K 4.5   CO2 25      BUN 14   CREATININE 0.9   ALKPHOS 95   ALT 11   AST 12   BILITOT 0.4     CBC:     Recent Labs  Lab 06/09/18 0419   WBC 5.97   RBC 3.03*   HGB 8.5*   HCT 26.5*      MCV 88   MCH 28.1   MCHC 32.1     Lipid Panel:     Recent Labs  Lab 06/04/18 2049   CHOL 109*   LDLCALC 51.8*   HDL 37*   TRIG 101     Coagulation:     Recent Labs  Lab 06/04/18 2049   INR 1.1   APTT <21.0     Platelet Aggregation Study: No results for input(s): PLTAGG, PLTAGINTERP, PLTAGREGLACO, ADPPLTAGGREG in the last 168 hours.  Hgb A1C:     Recent Labs  Lab 06/04/18 2049   HGBA1C 6.1*     TSH:     Recent Labs  Lab 06/04/18 2049   TSH <0.010*         Diagnostic Results:  CTA MP 6/4/18 - reviewed with Dr España in real time   Radiology read- Possible right frontal area of hypoattenuation with evaluation limited by motion artifact.  Additional left centrum semiovale area of hypoattenuation possibly a recent or remote infarction.    Mild luminal irregularity of the right proximal MCA  as seen on series 5, image 1442.  No evidence of large vessel occlusion in the intracranial circulation.    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  The overall evaluation is difficult given motion within this region.  Outpatient repeat CTA of the head is suggested for further evaluation.    No hemodynamically significant stenosis in the neck vessels.     MRI:   Noncontrast MRI of the brain demonstrates no acute hemorrhage or infarct.    Mild chronic microvascular ischemic changes in the supratentorial white matter.    Echo   CONCLUSIONS     1 - Normal left ventricular systolic function (EF 65-70%).     2 - Indeterminate LV diastolic function.     3 - Normal right ventricular systolic function .     4 - Pulmonary hypertension. The estimated PA systolic pressure is 51 mmHg.     5 - Trivial tricuspid regurgitation.     6 - Moderate left atrial enlargement.     7 - Concentric remodeling.     8 - No wall motion abnormalities.

## 2018-06-09 NOTE — PROGRESS NOTES
[facility]  Vascular Neurology  Comprehensive Stroke Center  Progress Note    Assessment/Plan:     * Right MCA ischemic stroke, aborted by t-PA    67 year old female with left sided weakness, gaze preference, and speech diffculties found down at home, intubated and sedated prior to transfer for potential thrombectomy after tpa treatment.     - CTA: negative for LVO   - MRI: negative for diffusion restrictions   - Tele: Reviewed / Tachycardia +, No concerns for Afib / RVR   - Echo: + LA enlargements     - Unclear atherothrombotic vs embolic aborted by t-PA    Antithrombotics for secondary stroke prevention: Dcd apixaban for negative tele for Afib on review / Continue ASA     Statins for secondary stroke prevention and hyperlipidemia, if present: Atorvastatin 40 mg OD    Aggressive risk factor modification: HTN, DM, HLD     Rehab efforts: PT/OT/SLP to evaluate and treat - Recommendations [facility]      Diagnostics ordered/pending: none    VTE prophylaxis: Heparin     BP parameters: Infarct: Post tPA, SBP <180        Neck pain    Wearing c collar     MRI neck significantly motion limited but no abnormality seen  - F/u xray cervical spine negative  - ? Compression # in T3  - F/u ortho op basis / No active management / conservative         Other specified hypothyroidism    Continue synthroid        COPD (chronic obstructive pulmonary disease)    Stating well on 2L NC  - scheduled duo-nebs         Left-sided weakness    - resolved         Dyslipidemia    Takes fenofibrate at home  No statin on home meds    LDL 51  - Initiated atorvastatin 20 mg         Obesity due to excess calories    Stroke risk factor  counseling           Depression    - continue elavil         Essential hypertension    Stroke risk factor  SBP < 180 post tpa     - On BB         Diabetes    Stroke risk factor, SSI while inpatient  On metformin at home          Hepatomegaly    Outpatient follow up              6/5/18 - improvement  overnight, extubated. No noted weakness or speech difficulties   6/6/18 discussed HPI with patient's son who stated that when he found his mother down, she had BL weakness and was moving her eyes left and right. Patient denies having unilateral weakness after falling.   6/7-8: NAEON. No concerns for worsening NIHSS.   6/9/18 - rate better controlled. Back and rib pain remains- with hematoma and ecchymosis, improved with warm compresses    STROKE DOCUMENTATION   Acute Stroke Times   Last Known Normal Date: 06/04/18  Last Known Normal Time: 1700  Symptom Onset Date: 06/04/18  Symptom Onset Time: 1720  Stroke Team Called Date: 06/04/18  Stroke Team Arrival Date: 06/04/18  Stroke Team Arrival Time:  (arrived in ED at 8:07 pm, PA awaiting arrival)  CT Interpretation Time:  (initial ct read by telemed MD )    NIH Scale:  1a. Level Of Consciousness: 0-->Alert: keenly responsive  1b. LOC Questions: 0-->Answers both questions correctly  1c. LOC Commands: 0-->Performs both tasks correctly  2. Best Gaze: 0-->Normal  3. Visual: 0-->No visual loss  4. Facial Palsy: 0-->Normal symmetrical movements  5a. Motor Arm, Left: 0-->No drift: limb holds 90 (or 45) degrees for full 10 secs  5b. Motor Arm, Right: 0-->No drift: limb holds 90 (or 45) degrees for full 10 secs  6a. Motor Leg, Left: 0-->No drift: leg holds 30 degree position for full 5 secs  6b. Motor Leg, Right: 0-->No drift: leg holds 30 degree position for full 5 secs  7. Limb Ataxia: 0-->Absent  8. Sensory: 0-->Normal: no sensory loss  9. Best Language: 0-->No aphasia: normal  10. Dysarthria: 0-->Normal  11. Extinction and Inattention (formerly Neglect): 0-->No abnormality  Total (NIH Stroke Scale): 0       Modified Pitt Score: 1  Forrest Coma Scale:    ABCD2 Score:    [id number] Score:   HAS -BLED Score:   ICH Score:   Hunt & Kothari Classification:      Hemorrhagic change of an Ischemic Stroke: Does this patient have an ischemic stroke with hemorrhagic changes? No      Neurologic Chief Complaint: Right MCA embolic stroke      Subjective:     Interval History: Patient is seen for follow-up neurological assessment and treatment recommendations:    rate better controlled. Back and rib pain remains- with hematoma and ecchymosis, improved with warm compresses    HPI, Past Medical, Family, and Social History remains the same as documented in the initial encounter.     Review of Systems   Constitutional: Negative for fever.   Musculoskeletal: Positive for arthralgias and neck pain.        Back and rib pain   Skin: Positive for color change.   Neurological: Negative for speech difficulty and weakness.     Scheduled Meds:   albuterol-ipratropium  3 mL Nebulization Q8H    amitriptyline  50 mg Oral QHS    aspirin  81 mg Oral Daily    atorvastatin  20 mg Oral Daily    baclofen  5 mg Oral QID    busPIRone  10 mg Oral BID    furosemide  20 mg Oral BID    heparin (porcine)  5,000 Units Subcutaneous Q8H    levothyroxine  175 mcg Oral Daily    lidocaine  1 patch Transdermal Q24H    metoprolol tartrate  25 mg Oral Q8H    ramelteon  8 mg Oral QHS    senna-docusate 8.6-50 mg  2 tablet Oral BID    sodium chloride 0.9%  3 mL Intravenous Q8H     Continuous Infusions:    PRN Meds:acetaminophen, dextrose 50%, dextrose 50%, glucagon (human recombinant), glucose, HYDROcodone-acetaminophen, insulin aspart U-100, ondansetron    Objective:     Vital Signs (Most Recent):  Temp: 98.3 °F (36.8 °C) (06/09/18 1109)  Pulse: 88 (06/09/18 1109)  Resp: 18 (06/09/18 1109)  BP: 129/68 (06/09/18 1109)  SpO2: 97 % (06/09/18 1109)  BP Location: Right arm    Vital Signs Range (Last 24H):  Temp:  [97.6 °F (36.4 °C)-99.1 °F (37.3 °C)]   Pulse:  []   Resp:  [16-22]   BP: (118-139)/(59-78)   SpO2:  [90 %-97 %]   BP Location: Right arm    Physical Exam   Constitutional: She is oriented to person, place, and time. She appears well-developed and well-nourished.   HENT:   Head: Normocephalic and atraumatic.    Eyes: EOM are normal. Pupils are equal, round, and reactive to light.   Cardiovascular: Normal rate.    Pulmonary/Chest: Effort normal.   Neurological: She is alert and oriented to person, place, and time.   Skin:   Ecchymosis and hematoma of right lateral back. Tender to palpation   Psychiatric: She has a normal mood and affect. Thought content normal.   Nursing note and vitals reviewed.      Neurological Exam:   LOC: alert  Attention Span: Good   Language: No aphasia  Articulation: No dysarthria  Orientation: Person, Place, Time   Visual Fields: Full  EOM (CN III, IV, VI): Full/intact  Facial Movement (CN VII): Symmetric facial expression    Motor: Arm left  Normal 5/5  Leg left  Normal 5/5  Arm right  Normal 5/5  Leg right Normal 5/5  Sensation: Intact to light touch, temperature and vibration  Tone: Normal tone throughout    Laboratory:  CMP:     Recent Labs  Lab 06/09/18 0419   CALCIUM 7.7*   ALBUMIN 2.2*   PROT 5.7*      K 4.5   CO2 25      BUN 14   CREATININE 0.9   ALKPHOS 95   ALT 11   AST 12   BILITOT 0.4     CBC:     Recent Labs  Lab 06/09/18 0419   WBC 5.97   RBC 3.03*   HGB 8.5*   HCT 26.5*      MCV 88   MCH 28.1   MCHC 32.1     Lipid Panel:     Recent Labs  Lab 06/04/18 2049   CHOL 109*   LDLCALC 51.8*   HDL 37*   TRIG 101     Coagulation:     Recent Labs  Lab 06/04/18 2049   INR 1.1   APTT <21.0     Platelet Aggregation Study: No results for input(s): PLTAGG, PLTAGINTERP, PLTAGREGLACO, ADPPLTAGGREG in the last 168 hours.  Hgb A1C:     Recent Labs  Lab 06/04/18 2049   HGBA1C 6.1*     TSH:     Recent Labs  Lab 06/04/18 2049   TSH <0.010*         Diagnostic Results:  CTA MP 6/4/18 - reviewed with Dr España in real time   Radiology read- Possible right frontal area of hypoattenuation with evaluation limited by motion artifact.  Additional left centrum semiovale area of hypoattenuation possibly a recent or remote infarction.    Mild luminal irregularity of the right proximal MCA  as seen on series 5, image 1442.  No evidence of large vessel occlusion in the intracranial circulation.    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  The overall evaluation is difficult given motion within this region.  Outpatient repeat CTA of the head is suggested for further evaluation.    No hemodynamically significant stenosis in the neck vessels.     MRI:   Noncontrast MRI of the brain demonstrates no acute hemorrhage or infarct.    Mild chronic microvascular ischemic changes in the supratentorial white matter.    Echo   CONCLUSIONS     1 - Normal left ventricular systolic function (EF 65-70%).     2 - Indeterminate LV diastolic function.     3 - Normal right ventricular systolic function .     4 - Pulmonary hypertension. The estimated PA systolic pressure is 51 mmHg.     5 - Trivial tricuspid regurgitation.     6 - Moderate left atrial enlargement.     7 - Concentric remodeling.     8 - No wall motion abnormalities.       XAVI Cunha  Comprehensive Stroke Center  Department of Vascular Neurology   [facility]

## 2018-06-10 PROBLEM — S20.219A HEMATOMA, CHEST WALL: Status: ACTIVE | Noted: 2018-06-10

## 2018-06-10 PROBLEM — D64.9 ANEMIA: Status: ACTIVE | Noted: 2018-06-10

## 2018-06-10 PROBLEM — I50.9 CHRONIC CONGESTIVE HEART FAILURE: Status: ACTIVE | Noted: 2018-06-10

## 2018-06-10 PROBLEM — J96.11 CHRONIC RESPIRATORY FAILURE WITH HYPOXIA: Status: ACTIVE | Noted: 2018-06-10

## 2018-06-10 PROBLEM — S22.31XA RIGHT RIB FRACTURE: Status: ACTIVE | Noted: 2018-06-10

## 2018-06-10 LAB
POCT GLUCOSE: 113 MG/DL (ref 70–110)
POCT GLUCOSE: 124 MG/DL (ref 70–110)
POCT GLUCOSE: 167 MG/DL (ref 70–110)

## 2018-06-10 PROCEDURE — 94640 AIRWAY INHALATION TREATMENT: CPT

## 2018-06-10 PROCEDURE — 27000221 HC OXYGEN, UP TO 24 HOURS

## 2018-06-10 PROCEDURE — 94761 N-INVAS EAR/PLS OXIMETRY MLT: CPT

## 2018-06-10 PROCEDURE — 97110 THERAPEUTIC EXERCISES: CPT

## 2018-06-10 PROCEDURE — 97116 GAIT TRAINING THERAPY: CPT

## 2018-06-10 PROCEDURE — 11000004 HC SNF PRIVATE

## 2018-06-10 PROCEDURE — 25000003 PHARM REV CODE 250: Performed by: INTERNAL MEDICINE

## 2018-06-10 PROCEDURE — 94664 DEMO&/EVAL PT USE INHALER: CPT

## 2018-06-10 PROCEDURE — A4216 STERILE WATER/SALINE, 10 ML: HCPCS | Performed by: PHYSICIAN ASSISTANT

## 2018-06-10 PROCEDURE — 25000242 PHARM REV CODE 250 ALT 637 W/ HCPCS: Performed by: PHYSICIAN ASSISTANT

## 2018-06-10 PROCEDURE — 63600175 PHARM REV CODE 636 W HCPCS: Performed by: PHYSICIAN ASSISTANT

## 2018-06-10 PROCEDURE — 99305 1ST NF CARE MODERATE MDM 35: CPT | Mod: AI,,, | Performed by: INTERNAL MEDICINE

## 2018-06-10 PROCEDURE — 97161 PT EVAL LOW COMPLEX 20 MIN: CPT

## 2018-06-10 PROCEDURE — 25000003 PHARM REV CODE 250: Performed by: PHYSICIAN ASSISTANT

## 2018-06-10 PROCEDURE — 97165 OT EVAL LOW COMPLEX 30 MIN: CPT

## 2018-06-10 PROCEDURE — 97530 THERAPEUTIC ACTIVITIES: CPT

## 2018-06-10 PROCEDURE — 97535 SELF CARE MNGMENT TRAINING: CPT

## 2018-06-10 PROCEDURE — 27000646 HC AEROBIKA DEVICE

## 2018-06-10 RX ORDER — GUAIFENESIN 600 MG/1
600 TABLET, EXTENDED RELEASE ORAL 2 TIMES DAILY
Status: DISCONTINUED | OUTPATIENT
Start: 2018-06-10 | End: 2018-06-11

## 2018-06-10 RX ORDER — HYDROXYZINE HYDROCHLORIDE 10 MG/1
10 TABLET, FILM COATED ORAL 4 TIMES DAILY PRN
Status: DISCONTINUED | OUTPATIENT
Start: 2018-06-10 | End: 2018-06-15 | Stop reason: HOSPADM

## 2018-06-10 RX ORDER — TIOTROPIUM BROMIDE 18 UG/1
1 CAPSULE ORAL; RESPIRATORY (INHALATION) DAILY
Status: DISCONTINUED | OUTPATIENT
Start: 2018-06-11 | End: 2018-06-15 | Stop reason: HOSPADM

## 2018-06-10 RX ORDER — FUROSEMIDE 20 MG/1
20 TABLET ORAL DAILY
Status: DISCONTINUED | OUTPATIENT
Start: 2018-06-11 | End: 2018-06-13

## 2018-06-10 RX ORDER — AMOXICILLIN 250 MG
1 CAPSULE ORAL 2 TIMES DAILY
Status: DISCONTINUED | OUTPATIENT
Start: 2018-06-10 | End: 2018-06-13

## 2018-06-10 RX ADMIN — AMITRIPTYLINE HYDROCHLORIDE 50 MG: 50 TABLET, FILM COATED ORAL at 08:06

## 2018-06-10 RX ADMIN — METOPROLOL TARTRATE 25 MG: 25 TABLET ORAL at 09:06

## 2018-06-10 RX ADMIN — GUAIFENESIN 600 MG: 600 TABLET, EXTENDED RELEASE ORAL at 07:06

## 2018-06-10 RX ADMIN — BUSPIRONE HYDROCHLORIDE 10 MG: 10 TABLET ORAL at 08:06

## 2018-06-10 RX ADMIN — BUSPIRONE HYDROCHLORIDE 10 MG: 10 TABLET ORAL at 09:06

## 2018-06-10 RX ADMIN — LIDOCAINE 1 PATCH: 50 PATCH TOPICAL at 02:06

## 2018-06-10 RX ADMIN — BACLOFEN 5 MG: 10 TABLET ORAL at 05:06

## 2018-06-10 RX ADMIN — HYDROCODONE BITARTRATE AND ACETAMINOPHEN 1 TABLET: 5; 325 TABLET ORAL at 07:06

## 2018-06-10 RX ADMIN — RAMELTEON 8 MG: 8 TABLET, FILM COATED ORAL at 08:06

## 2018-06-10 RX ADMIN — STANDARDIZED SENNA CONCENTRATE AND DOCUSATE SODIUM 2 TABLET: 8.6; 5 TABLET, FILM COATED ORAL at 09:06

## 2018-06-10 RX ADMIN — IPRATROPIUM BROMIDE AND ALBUTEROL SULFATE 3 ML: .5; 3 SOLUTION RESPIRATORY (INHALATION) at 07:06

## 2018-06-10 RX ADMIN — ASPIRIN 81 MG CHEWABLE TABLET 81 MG: 81 TABLET CHEWABLE at 09:06

## 2018-06-10 RX ADMIN — HEPARIN SODIUM 5000 UNITS: 5000 INJECTION, SOLUTION INTRAVENOUS; SUBCUTANEOUS at 02:06

## 2018-06-10 RX ADMIN — BACLOFEN 5 MG: 10 TABLET ORAL at 09:06

## 2018-06-10 RX ADMIN — FUROSEMIDE 20 MG: 20 TABLET ORAL at 09:06

## 2018-06-10 RX ADMIN — HEPARIN SODIUM 5000 UNITS: 5000 INJECTION, SOLUTION INTRAVENOUS; SUBCUTANEOUS at 05:06

## 2018-06-10 RX ADMIN — METOPROLOL TARTRATE 25 MG: 25 TABLET ORAL at 05:06

## 2018-06-10 RX ADMIN — LEVOTHYROXINE SODIUM 175 MCG: 100 TABLET ORAL at 05:06

## 2018-06-10 RX ADMIN — BACLOFEN 5 MG: 10 TABLET ORAL at 02:06

## 2018-06-10 RX ADMIN — BACLOFEN 5 MG: 10 TABLET ORAL at 08:06

## 2018-06-10 RX ADMIN — HEPARIN SODIUM 5000 UNITS: 5000 INJECTION, SOLUTION INTRAVENOUS; SUBCUTANEOUS at 09:06

## 2018-06-10 RX ADMIN — HYDROCODONE BITARTRATE AND ACETAMINOPHEN 1 TABLET: 5; 325 TABLET ORAL at 05:06

## 2018-06-10 RX ADMIN — METOPROLOL TARTRATE 25 MG: 25 TABLET ORAL at 02:06

## 2018-06-10 RX ADMIN — IPRATROPIUM BROMIDE AND ALBUTEROL SULFATE 3 ML: .5; 3 SOLUTION RESPIRATORY (INHALATION) at 03:06

## 2018-06-10 RX ADMIN — ATORVASTATIN CALCIUM 20 MG: 20 TABLET, FILM COATED ORAL at 09:06

## 2018-06-10 NOTE — PLAN OF CARE
Problem: Patient Care Overview  Goal: Plan of Care Review  Outcome: Ongoing (interventions implemented as appropriate)   06/10/18 0215   Coping/Psychosocial   Plan Of Care Reviewed With patient;son       Problem: ARDS (Acute Resp Distress Syndrome) (Adult)  Intervention: Optimize Oxygenation/Ventilation/Perfusion   06/10/18 0215   Respiratory Interventions   Airway/Ventilation Management airway patency maintained;calming measures promoted     Intervention: Position to Optimize Ventilation   06/10/18 0215   Positioning   Body Position ambulate to bathroom;positioned/repositioned independently     Intervention: Provide Preventive/Supportive Care   06/10/18 0215   Activity   Activity Type activity adjusted per tolerance;bedrest with bathroom privileges;ROM, active encouraged;dorsiflexion, plantar flexion encouraged;sitting, edge of bed;stand at bedside;up in chair     Intervention: Prevent/Manage DVT/VTE Risk   06/10/18 0215   OTHER   VTE Required Core Measure Pharmacological prophylaxis initiated/maintained   Minimize Embolism Risk   VTE Prevention/Management bleeding precautions maintained;bleeding risk assessed;ambulation promoted;bleeding risk factor(s) identified, provider notified;dorsiflexion/plantar flexion performed;fluids promoted;ROM (active) performed     Intervention: Support/Optimize Psychosocial Response to Illness   06/10/18 0215   Coping/Psychosocial Interventions   Supportive Measures active listening utilized;counseling provided;verbalization of feelings encouraged;relaxation techniques promoted       Goal: Signs and Symptoms of Listed Potential Problems Will be Absent, Minimized or Managed (ARDS)  Signs and symptoms of listed potential problems will be absent, minimized or managed by discharge/transition of care (reference ARDS (Acute Resp Distress Syndrome) (Adult) CPG).  Outcome: Ongoing (interventions implemented as appropriate)   06/10/18 0215   ARDS (Acute Resp Distress Syndrome)   Problems  Assessed (Acute Respiratory Distress Syndrome) all   Problems Present (Acute Respiratory Distress Syndrome) situational response

## 2018-06-10 NOTE — ASSESSMENT & PLAN NOTE
Improved  Continue therapy with ASA, atorvastatin  -continue PT/OT to increase ambulation, ADL performance and endurance  -continue heparin for DVT prophylaxis  -continue fall precautions  -continue senokot-s to prevent constipation; hold for frequent or loose stooling  F/U with vascular neurology as scheduled

## 2018-06-10 NOTE — PLAN OF CARE
Problem: Occupational Therapy Goal  Goal: Occupational Therapy Goal  Goals to be met by: 8 days     Patient will increase functional independence with ADLs by performing:    UE Dressing with Modified [place].  LE Dressing with Modified [place].  Grooming while standing at sink with Supervision.  Toileting from toilet with Modified [place] for hygiene and clothing management.   Bathing from  shower chair/bench with Supervision.  Supine to sit with Modified Love /c HOB flat and no handrails.  Stand pivot transfers with Modified [place].  Toilet transfer to toilet with Modified [place].  Upper extremity exercise program x 15 reps per handout, with independence.  Patient will complete a functional dynamic standing activity x 10 min with S in order to complete a functional household task.     Outcome: Ongoing (interventions implemented as appropriate)  Patient's goals are set.   SANAZ Ziegler  6/10/2018

## 2018-06-10 NOTE — ASSESSMENT & PLAN NOTE
Improved control with metoprolol which was started for tachycardia  Repeat EKG if HR consistently elevated  -will continue to monitor and adjust regimen as necessary

## 2018-06-10 NOTE — PT/OT/SLP EVAL
Occupational Therapy  Evaluation/Treatment    Olimpia Cardoza   MRN: [id number]   Admitting Diagnosis: CVA (cerebral vascular accident)     OT Date of Treatment: 06/10/18   OT Start Time: 0935  OT Stop Time: 1020  OT Total Time (min): 45 min    Billable Minutes:  Evaluation 15  Self Care/Home Management 30    Diagnosis: CVA (cerebral vascular accident)    Past Medical History:   Diagnosis Date    A-fib 6/7/2018    CVA (cerebral vascular accident) 6/4/2018    Depression 2/24/2015    Diabetes mellitus     Emphysema 2/24/2015    Hx smoking; home oxygen 2 l for past 4 years    Encounter for blood transfusion     Hepatomegaly 2/24/2015    Found on Routine imaging    HTN (hypertension) 2/24/2015    Hyperlipidemia     Other specified hypothyroidism 6/5/2018    Sleep apnea 2/24/2015      Past Surgical History:   Procedure Laterality Date    CARDIAC CATHETERIZATION      CHOLECYSTECTOMY      dislocated hip      traction    HEMORRHOID SURGERY      HERNIA REPAIR      with mesh    NISSEN FUNDOPLICATION      THYROID SURGERY           General Precautions: Standard, aspiration, fall, respiratory  Orthopedic Precautions: N/A  Braces: N/A          Patient History:  Lives With: child(julee), adult (2 sons)  Living Arrangements: house  Home Accessibility: stairs to enter home  Number of Stairs to Enter Home: 1 (threshold)  Living Environment Comment: Patient lives with two sons in a 1  with 1 threshold. Patient has a tub/shower combo with chair. Patient has a rollator, RW, SW, and a w/c. Patient has O2 at home 2 L. Patient does not drive. Patient sister can also assist patient.   Equipment Currently Used at Home: rollator, walker, standard, wheelchair, walker, rolling, oxygen, shower chair    Prior level of function:   Bed Mobility/Transfers: needs device  Grooming: independent  Bathing: needs device  Upper Body Dressing: independent  Lower Body Dressing: independent  Toileting: independent        Dominant hand:  right    Subjective:  Communicated with patient prior to session.    Chief Complaint: pain   Patient/Family stated goals:  To go home    Pain/Comfort  Pain Rating 1: 10/10  Location - Side 1: Right  Location - Orientation 1: posterior  Location 1: rib(s)  Pain Addressed 1: Pre-medicate for activity, Reposition, Distraction  Pain Rating Post-Intervention 1: 10/10    Objective:   Patient found with: telemetry, oxygen, bed alarm    Cognitive Exam:  Oriented to: Person, Place, Time and Situation  Follows Commands/attention: Follows multistep  commands  Communication: clear/fluent  Memory:  No Deficits noted  Safety awareness/insight to disability: intact  Coping skills/emotional control: Appropriate to situation    Visual/perceptual:  Intact    Physical Exam:  Postural examination/scapula alignment: -       Rounded shoulders  Skin integrity: Bruising   Edema: None noted      Sensation:      -       Intact    Upper Extremity Range of Motion:  Right Upper Extremity: WFL  Left Upper Extremity: WFL    Upper Extremity Strength:  Right Upper Extremity: WFL  Left Upper Extremity: WFL   Strength: WFL    Fine motor coordination:      -       Intact    Gross motor coordination: WFL    Functional Status:  MDS G  Bed Mobility Functional Status: SBA supine>sit with HOB elevated  Transfer Functional Status: S bed>w/c stand pivot; w/c<>toilet using grab bar with SBA  Dressing Functional Status: SBA [place] and donning front gown. Donning back gown; Donning pants and socks with SBA  Eating Functional Status: mod(I)  Toilet Use Functional Status: CGA  Personal Hygiene Functional Status: S oral care and washing face sitting EOB  Bathing Functional Status: S spongebath EOB  Eval Only: Number of U/E limb <4/5 MMT: 0    MDS GG  Eating Performance: Independent.  Oral Hygiene Performance: Supervision or touching assistance.  Toileting Hygiene Performance: Partial/moderate assistance.  Lying to sitting on side of bed Performance:  Supervision or touching assistance.  Sit to Stand Performance: Supervision or touching assistance.  Chair/bed-to-chair transfer Performance: Supervision or touching assistance.  Toilet transfer Performance: Supervision or touching assistance.  Toilet transfer Goal: Independent.    [facility] 6 Click:  AMPA Total Score: 19    Additional Treatment:  Safety, ADL retraining, functional mobility training,. Discharge planning    Patient left up in chair with call button in reach, sons present and all needs met.     Assessment:  Olimpia Cardoza is a 67 y.o. female with a medical diagnosis of CVA (cerebral vascular accident) and presents with the deficits listed below. Patient will benefit from skilled OT services to achieve maximal independence. Low complexity evaluation due to decreased self care performance and decreased functional mobility.    Rehab identified problem list/impairments: weakness, impaired endurance, impaired self care skills, gait instability, impaired functional mobilty, impaired balance, pain    Rehab potential is good    Activity tolerance: Good    Discharge recommendations: home with home health     Barriers to discharge: None     Equipment recommendations: bedside commode     GOALS:    Occupational Therapy Goals        Problem: Occupational Therapy Goal    Goal Priority Disciplines Outcome Interventions   Occupational Therapy Goal     OT, PT/OT Ongoing (interventions implemented as appropriate)    Description:  Goals to be met by: 8 days     Patient will increase functional independence with ADLs by performing:    UE Dressing with Modified [place].  LE Dressing with Modified [place].  Grooming while standing at sink with Supervision.  Toileting from toilet with Modified [place] for hygiene and clothing management.   Bathing from  shower chair/bench with Supervision.  Supine to sit with Modified [place] /c HOB flat and no handrails.  Stand pivot transfers with Modified  [place].  Toilet transfer to toilet with Modified [place].  Upper extremity exercise program x 15 reps per handout, with independence.  Patient will complete a functional dynamic standing activity x 10 min with S in order to complete a functional household task.                       PLAN: Patient to be seen 5 x/week to address the above listed problems via self-care/home management, therapeutic activities, therapeutic exercises  Plan of Care expires: 07/10/18  Plan of Care reviewed with: patient, son    SANAZ Ziegler  06/10/2018

## 2018-06-10 NOTE — ASSESSMENT & PLAN NOTE
Hemoglobin A1C   Date Value Ref Range Status   06/04/2018 6.1 (H) 4.0 - 5.6 % Final     Comment:   07/14/2016 6.4 (H) 4.5 - 6.2 % Final     Comment:     Metformin and amaryl chronically at home; controlled  Glucoses stable when admitted on a regular diet.  Continue therapy with glucose monitoring AC and HS.  Change to diabetic diet if glucoses elevated.  Hyperglycemia to be treated with SSNI prn.  Glucose goal is < 180mg/dl and avoidance of hypoglycemia.  Will continue to monitor and adjust regimen as necessary to achieve goals.

## 2018-06-10 NOTE — ASSESSMENT & PLAN NOTE
Patient interactive and cooperative currently  Continue chronic therapy with amitriptyline, buspirone  Wellbutrin, zoloft and quetiapine also on chronic list in EPIC which will need to be confirmed with patient's pharmacy before resuming.   Continue ramelteon nightly for insomnia

## 2018-06-10 NOTE — HPI
Chief Complaint/Reason for Admission: CVA (cerebral vascular accident)    History of Present Illness:  Patient is a 67 y.o. female with HTN, HLP, DM2, COPD who presents to SNF after hospitalization for acute stroke requiring tPA.  She remains with minimal left sided weakness. She was wearing a cervical collar due to neck pain with negative x-rays and a poor cervical spine MRI with no gross abnormalities which was discontinued prior to discharge.  She has extensive bruising and has history of a fall prior to being found unresponsive.  Rib fractures present on imaging.  She complains of a cough with thick sputum. She has a chronic cough at home with green sputum but not as milky like currently.  She has chronic MUÑOZ which is stable and wears oxygen at home. The patient has been admitted to SNF for ongoing PT/OT due to insufficient progress to go home safely from the hospital.

## 2018-06-10 NOTE — ASSESSMENT & PLAN NOTE
Documented in record; Echo with nl EF but indeterminate diastolic function  Continue therapy with lasix as she takes daily  Will weigh daily to monitor and adjust regimen as necessary

## 2018-06-10 NOTE — ASSESSMENT & PLAN NOTE
Suspect some blood loss with hematoma and extensive bruising  Continue to monitor with twice weekly labs

## 2018-06-10 NOTE — ASSESSMENT & PLAN NOTE
Tender to patient and remains in marked border  Continue hydrocodone as needed for pain  Will continue to monitor

## 2018-06-10 NOTE — PT/OT/SLP EVAL
PhysicalTherapy   Evaluation and treatment    Olimpia Cardoza   MRN: [id number]     PT Received On: 06/10/18  PT Start Time: 1030     PT Stop Time: 1115    PT Total Time (min): 45 min       Billable Minutes:  Evaluation 1, Gait Training 15, Therapeutic Activity 15 and Therapeutic Exercise 8= E + 38    Diagnosis: CVA (cerebral vascular accident)  Past Medical History:   Diagnosis Date    A-fib 6/7/2018    CVA (cerebral vascular accident) 6/4/2018    Depression 2/24/2015    Diabetes mellitus     Emphysema 2/24/2015    Hx smoking; home oxygen 2 l for past 4 years    Encounter for blood transfusion     Hepatomegaly 2/24/2015    Found on Routine imaging    HTN (hypertension) 2/24/2015    Hyperlipidemia     Other specified hypothyroidism 6/5/2018    Sleep apnea 2/24/2015      Past Surgical History:   Procedure Laterality Date    CARDIAC CATHETERIZATION      CHOLECYSTECTOMY      dislocated hip      traction    HEMORRHOID SURGERY      HERNIA REPAIR      with mesh    NISSEN FUNDOPLICATION      THYROID SURGERY           General Precautions: Standard, aspiration, fall, respiratory  Orthopedic Precautions: N/A   Braces: N/A    Do you have any cultural, spiritual, Catholic conflicts, given your current situation?: none    Patient History:  Lives With: child(julee), adult (3 sons)  Living Arrangements: house  Home Accessibility:  (no concerns)  Living Environment Comment: Lives with three sons. One of them is always with her, per pt report. Was using a rollator at all times but sons want her to begin using a rolling walker because she cannot clasp the brakes shut on the rollator.  Pt needed supervision for showering and use of restroom. Wears home O2 (2L), does not drive, likes to agnes and do other arts and crafts. Owns a shower chair (cannot use bath bench because it's a sliding shower door with bath tub.  Owns a rolling walker also.   Equipment Currently Used at Home: walker, rolling, rollator, shower chair,  "oxygen  DME owned (not currently used): none    Previous Level of Function:  Ambulation Skills: needs device  Transfer Skills: needs device  ADL Skills: needs device  Work/Leisure Activity: needs device    Subjective:  Communicated with OT prior to session.  Pt was agreeable to PT services.   Chief Complaint: Dizziness intermittently likely due to antidepressants.  Patient goals: " To get back to walking and to be as independent as I can."    Pain/Comfort  Pain Rating 1: 10/10  Location - Side 1: Right  Location - Orientation 1: posterior  Location 1: rib(s)  Pain Addressed 1: Pre-medicate for activity  Pain Rating Post-Intervention 1: 8/10    Objective:  Patient found seated in wheelchair.      Cognitive Exam:  Oriented to: Person, Place, Time and Situation  Follows Commands/attention: Follows multistep  commands  Communication: clear/fluent  Safety awareness/insight to disability: intact    Physical Exam:  Postural examination/scapula alignment: -       Rounded shoulders    Skin integrity: Visible skin intact and Bruising of nape of neck (likely due to fall)  Edema: Mild BLE    Sensation:   -       Intact  light/touch BLEs    Upper Extremity Range of Motion:  Right Upper Extremity: WNL  Left Upper Extremity: WNL    Upper Extremity Strength:  Right Upper Extremity: WNL  Left Upper Extremity: WNL    Lower Extremity Range of Motion:  Right Lower Extremity: WNL  Left Lower Extremity: WNL    Lower Extremity Strength:  Right Lower Extremity: WNL  Left Lower Extremity: WNL     Fine motor coordination:  -       Intact  Left hand, finger to nose, Right hand, finger to nose, Left hand thumb/finger opposition skills, Right hand thumb/finger opposition skills, RLE heel shin and LLE heel shin   No clonus noted on either LE.     Gross motor coordination: WFL    Functional Status:  MDS G  Bed Mobility Functional Status: CGA-Min (A)  Transfer Functional Status: CGA-Min (A)  Walk in Room Functional Status: CGA-Min (A)  Walk in " Corridor Functional Status: CGA-Min (A)  Locomotion on Unit Functional Status: CGA-Min (A) (via ambulation)  Eval Only: Number of L/E limb <4/5 MMT: 0    MDS GG  Sit to lying Performance: Supervision or touching assistance.  Lying to sitting on side of bed Performance: Partial/moderate assistance., See goal below  Lying to sitting on side of bed Goal: Supervision or touching assistance.  Sit to Stand Performance: Supervision or touching assistance.  Chair/bed-to-chair transfer Performance: Supervision or touching assistance.  Does the resident walk?: Yes --> Continue to Walk 50 feet with two turns assessment  Walk 50 feet with two turns Performance: Supervision or touching assistance.  Walk 150 feet Performance: Supervision or touching assistance.  Does the resident use a wheelchair/scooter?: No --> Skip to Self Care    AM-PAC 6 CLICK MOBILITY  Total Score:19    Bed Mobility:  Sit>Supine: SBA onto flat surface (mat)  Supine>Sit: Min A needed due to pain in R rib. (Reported dizziness upon supine>sit with diaphoresis noted as well)    Transfers:  Sit<>Stand: SBA-CGA  Stand Pivot Transfer: SBA-CGA    Gait:  Ambulated 120 feet with use of a rolling walker, SBA-CGA with step-to gait pattern initially. With cueing, she was able to perform step-through gait pattern. Needed cues for standing upright with proper posture.     Vitals:   Pre- gait: 95%, 104bpm  Post-gait: 98%, 96bpm     10 meter (6 meter) Walk Test:   Patient Instructions: Pt is instructed to walk a set distance (6 meters or 10 meters). Distance covered is divided by the time it took to walk that distance. Calculate Average of 3 trials. Assistive device can be used but must be kept consistent. If patient requires assistance to walk this test is not appropriate.    Gait Speed = 0.34 m/s with rolling walker assistive device at preferred speed    Interpretation:   Small meaningful change=.05 m/s  Substantial meaningful change=.13 m/s    < 0.4 m/s were more likely  to be household ambulators  0.4 - 0.8 m/s limited community ambulators  > 0.8 m/s were community ambulators    Advanced Gait:  Curb Step: CGA with use of a rolling walker (with proper technique noted), 4 inch curb step    Wheelchair Mobility:  Patient propels w/c 100 feet with BUE use and supervision     Therex:  Seated- hip flexion, long arc quads, ankle pumps with knee extension x 20 reps BLE      Balance:  Was able to take two steps forward, backward, L/R with BUE support on PT, unilateral UE support, and no UE support with CGA throughout.    Patient left up in chair with call button in reach.    Assessment:  Olimpia Cardoza is a 67 y.o. female with a medical diagnosis of CVA (cerebral vascular accident). Ms. Cardoza's presentation is stable at this time, denoting a low complexity evaluation.  She will make a full recovery, as she is already ambulating 120 feet with SBA-CGA using a rolling walker with step-through gait pattern. She tolerated a full therapy session and was able to take few steps in all directions with no assistive device, contact guard assistance required throughout. During her SNF stay, we will be addressing her balance, gait mechanics, and possibly gait without an assistive device, if indicated/appropriate.    Rehab identified problem list/impairments: impaired endurance, gait instability, impaired balance, pain, impaired functional mobilty    Rehab potential is good.    Activity tolerance: Good    Discharge recommendations: home health PT     Barriers to discharge: None    Equipment recommendations: bedside commode     GOALS:    Physical Therapy Goals        Problem: Physical Therapy Goal    Goal Priority Disciplines Outcome Goal Variances Interventions   Physical Therapy Goal     PT/OT, PT Ongoing (interventions implemented as appropriate)     Description:  Goals to be met by: 8 days     Patient will increase functional independence with mobility by performin. Supine to sit (HOB>30 degrees  due to rib pain) with supervision.  2. Sit to supine (HOB>30 degrees due to rib pain) with supervision.  3. Sit to stand transfer with Supervision  4. Bed to chair transfer with Supervision using Rolling Walker  5. Gait  x 300 feet with Supervision using Rolling Walker with reciprocal gait pattern.   6. Ascend/descend 4 stairs with bilateral Handrails Stand-by to denote sufficient strength for stair negotiation in the community.  7. Ascend/Descend 4 inch curb step with Stand-by Assistance using Rolling Walker.  8. Stand for 5 minutes with Supervision using Rolling Walker and/or UE support on stable surface while performing an activity.  9. Lower extremity exercise program x 20 reps per handout, with independence.  10. Pt will improve gait speed, to denote a lower fall risk and improvement in balance, from 0.34 m/s to 0.47m/s with use of a rolling walker.                         PLAN:    Patient to be seen 5 x/week  to address the above listed problems via    Plan of Care Expires: 07/10/18    Jeannie Li, PT 6/10/2018

## 2018-06-10 NOTE — H&P
[facility] [place] [facility] - Skilled Nursing Care  Department of [facility] Medicine  History & Physical    Patient Name: Olimpia Cardoza  MRN: [id number]  Code Status: Full Code  Admission Date: 6/9/2018  Attending Physician: Allison Rajan MD   Primary Care Provider: Shad Burkett MD    Subjective:     Principal Problem:Embolic stroke involving right middle cerebral artery       HPI: Chief Complaint/Reason for Admission: CVA (cerebral vascular accident)    History of Present Illness:  Patient is a 67 y.o. female with HTN, HLP, DM2, COPD who presents to SNF after hospitalization for acute stroke requiring tPA.  She remains with minimal left sided weakness. She was wearing a cervical collar due to neck pain with negative x-rays and a poor cervical spine MRI with no gross abnormalities which was discontinued prior to discharge.  She has extensive bruising and has history of a fall prior to being found unresponsive.  Rib fractures present on imaging.  She complains of a cough with thick sputum. She has a chronic cough at home with green sputum but not as milky like currently.  She has chronic MUÑOZ which is stable and wears oxygen at home.     The patient has been admitted to SNF for ongoing PT/OT due to insufficient progress to go home safely from the hospital.    Records from last hospital stay reviewed and summarized above.     Past Medical History:   Diagnosis Date    A-fib 6/7/2018    CVA (cerebral vascular accident) 6/4/2018    Depression 2/24/2015    Diabetes mellitus     Emphysema 2/24/2015    Hx smoking; home oxygen 2 l for past 4 years    Encounter for blood transfusion     Hepatomegaly 2/24/2015    Found on Routine imaging    HTN (hypertension) 2/24/2015    Hyperlipidemia     Other specified hypothyroidism 6/5/2018    Sleep apnea 2/24/2015       Past Surgical History:   Procedure Laterality Date    CARDIAC CATHETERIZATION      CHOLECYSTECTOMY      dislocated hip      traction    HEMORRHOID SURGERY      HERNIA  REPAIR      with mesh    NISSEN FUNDOPLICATION      THYROID SURGERY         Review of patient's allergies indicates:   Allergen Reactions    Codeine Palpitations       No current facility-administered medications on file prior to encounter.      Current Outpatient Prescriptions on File Prior to Encounter   Medication Sig    albuterol (PROVENTIL) 5 mg/mL nebulizer solution Take 2.5 mg by nebulization 3 (three) times daily.     alendronate (FOSAMAX) 70 MG tablet Take 70 mg by mouth every 7 days.    ALPRAZolam (XANAX) 0.25 MG tablet Take 0.25 mg by mouth 2 (two) times daily as needed for Anxiety.    amitriptyline (ELAVIL) 50 MG tablet Take 50 mg by mouth every evening.    ascorbic acid (VITAMIN C) 500 MG tablet Take 500 mg by mouth once daily.    aspirin 81 MG Chew Take 81 mg by mouth every evening. Two 81mg  Once a day    atorvastatin (LIPITOR) 20 MG tablet Take 1 tablet (20 mg total) by mouth once daily.    baclofen (LIORESAL) 10 MG tablet Take 10 mg by mouth 4 (four) times daily.    buPROPion (WELLBUTRIN XL) 300 MG 24 hr tablet Take 300 mg by mouth once daily.    busPIRone (BUSPAR) 30 MG Tab Take 30 mg by mouth 2 (two) times daily.    calcium carbonate (OS-CAREY) 600 mg (1,500 mg) Tab Take 600 mg by mouth 2 (two) times daily with meals.    citalopram (CELEXA) 40 MG tablet Take 40 mg by mouth once daily. 1/2 tablet twice daily    furosemide (LASIX) 20 MG tablet Take 1 tablet (20 mg total) by mouth 2 (two) times daily. (Patient taking differently: Take 20 mg by mouth once daily. )    glimepiride (AMARYL) 2 MG tablet Take 2 mg by mouth before breakfast.    hydrOXYzine (ATARAX) 50 MG tablet Take 50 mg by mouth 4 (four) times daily as needed.     ipratropium (ATROVENT HFA) 17 mcg/actuation inhaler Inhale 2 puffs into the lungs 4 (four) times daily.    levothyroxine (SYNTHROID, LEVOTHROID) 175 MCG tablet Take 175 mcg by mouth once daily.    meloxicam (MOBIC) 7.5 MG tablet Take 7.5 mg by mouth once  daily.    metFORMIN (GLUMETZA) 1000 MG (MOD) 24 hr tablet Take 1,000 mg by mouth daily with breakfast.    metoprolol tartrate (LOPRESSOR) 25 MG tablet Take 1 tablet (25 mg total) by mouth every 8 (eight) hours.    pantoprazole (PROTONIX) 40 MG tablet Take 40 mg by mouth once daily.    quetiapine (SEROQUEL) 100 MG Tab Take 100 mg by mouth 2 (two) times daily.     QUEtiapine (SEROQUEL) 200 MG Tab Take 200 mg by mouth 2 (two) times daily.    sertraline (ZOLOFT) 50 MG tablet Take 50 mg by mouth once daily.    tiotropium (SPIRIVA) 18 mcg inhalation capsule Inhale 18 mcg into the lungs once daily.     Family History     Problem Relation (Age of Onset)    Coronary artery disease Father, Brother    Emphysema Mother    Lung cancer Mother    No Known Problems Sister, Sister, Sister, Son, Son, Son, Maternal Grandmother, Maternal Grandfather, Paternal Grandmother, Paternal Grandfather, Maternal Aunt, Maternal Uncle, Paternal Aunt, Paternal Uncle    SIDS Daughter    Stroke Brother        Social History Main Topics    Smoking status: Former Smoker     Packs/day: 3.00     Years: 47.00     Quit date: 3/21/2012    Smokeless tobacco: Not on file    Alcohol use No    Drug use: No    Sexual activity: Not on file     Review of Systems   Constitutional: no fever or chills  Eyes: no visual changes  ENT: no nasal congestion or sore throat  Respiratory: + cough; no shortness of breath  Cardiovascular: no chest pain or palpitations  Gastrointestinal: no nausea or vomiting, no abdominal pain; last BM: 6/9  Genitourinary: no hematuria or dysuria  Integument/Breast: no rash or pruritis  Hematologic/Lymphatic: no easy bruising or lymphadenopathy  Allergy/Immunology: no postnasal drip  Musculoskeletal: + rib pain  Neurological: no seizures or tremors  Behavioral/Psych: no auditory or visual hallucinations  Endocrine: no heat or cold intolerance        Objective:     Vital Signs (Most Recent):  Temp: 97.6 °F (36.4 °C) (06/10/18  0830)  Pulse: 77 (06/10/18 1549)  Resp: 16 (06/10/18 1549)  BP: 110/70 (06/10/18 1408)  SpO2: 100 % (06/10/18 1549) Vital Signs (24h Range):  Temp:  [97.6 °F (36.4 °C)-98.1 °F (36.7 °C)] 97.6 °F (36.4 °C)  Pulse:  [] 77  Resp:  [16-20] 16  SpO2:  [92 %-100 %] 100 %  BP: (110-159)/(64-79) 110/70     Weight: 74.7 kg (164 lb 10.9 oz)  Body mass index is 31.12 kg/m².    Physical Exam  General: well developed, well nourished, no distress, seated in recliner  HENT: Head:normocephalic, atraumatic. Ears:bilateral external ear canals normal. Nose: Nares normal. Septum midline. Mucosa normal. Throat: lips, mucosa, and tongue normal and no throat erythema.  Eyes: conjunctivae/corneas clear.  EOM normal.  Neck: supple, symmetrical, trachea midline.  Lungs:  clear to auscultation bilaterally and normal respiratory effort  Cardiovascular: Heart: RRR with ectopy, no murmur, click, rub or gallop. Chest Wall: + tenderness to right and back. Extremities: no cyanosis, no edema, no clubbing. Pulses: 2+ and symmetric.  Abdomen/Rectal: Abdomen: soft, non-tender non-distended; bowel sounds normal; no masses,  no organomegaly.   Skin: extensive bruising to BUE, right> left chest; hematoma to below right costal margin  Musculoskeletal:no clubbing, cyanosis  Lymph Nodes: No cervical or supraclavicular adenopathy  Neurologic: Normal strength and tone. Hand  4/5 on left  Psych/Behavioral:  Alert and oriented, appropriate affect.    Significant Labs:     Recent Labs  Lab 06/07/18  0558 06/08/18  0434 06/09/18  0419   WBC 8.13 7.47 5.97   HGB 8.9* 7.9* 8.5*   HCT 28.1* 24.8* 26.5*    210 244         Recent Labs  Lab 06/07/18  0558 06/08/18  0434 06/09/18  0419    139 140   K 4.9 5.1 4.5   * 109 106   CO2 20* 22* 25   BUN 16 15 14   CREATININE 1.0 0.9 0.9   CALCIUM 7.6* 7.8* 7.7*   PROT 6.1 5.8* 5.7*   BILITOT 0.4 0.4 0.4   ALKPHOS 105 93 95   ALT 13 12 11   AST 14 13 12         Significant Imaging:     CXR 6/8/2018 1  view  FINDINGS:  Mild cardiomegaly.  Opacification at the right lung base probably a combination of pleural effusion and volume loss.  The left lung is clear.  No extrapulmonary air identified    Assessment/Plan:     * Right MCA ischemic stroke, aborted by t-PA    Improved  Continue therapy with ASA, atorvastatin  -continue PT/OT to increase ambulation, ADL performance and endurance  -continue heparin for DVT prophylaxis  -continue fall precautions  -continue senokot-s to prevent constipation; hold for frequent or loose stooling  F/U with vascular neurology as scheduled          Right rib fracture    documented by ICU team likely from previous facility at [facility]  Continue baclofen and lidoderm patches to treat pain        Anemia    Suspect some blood loss with hematoma and extensive bruising  Continue to monitor with twice weekly labs          Chronic congestive heart failure    Documented in record; Echo with nl EF but indeterminate diastolic function  Continue therapy with lasix as she takes daily  Will weigh daily to monitor and adjust regimen as necessary        Chronic respiratory failure with hypoxia    Continue supplemental oxygen as she uses at home.  Monitor pulse ox.   Minimize oxygen flow rate due to COPD.        Hematoma, chest wall    Tender to patient and remains in marked border  Continue hydrocodone as needed for pain  Will continue to monitor        Other specified hypothyroidism    Hx of thyroid surgery  Chronic and stable  Continue therapy with levothyroxine  F/U with PCP for ongoing monitoring and adjustment of levothyroxine dose as indicated          COPD (chronic obstructive pulmonary disease)    Increased cough with stable dyspnea on exertion  Continue albuterol nebs TID as she takes at home  Consider change to xopenex if tachycardia occurs  Start acapella and guaifenesin to treat cough  Resume spiriva therapy  Patient afebrile with normal WBC; proceed to repeat CXR if cough does  not improve        Dyslipidemia    Continue therapy with low fat diet and atorvastatin        Depression    Patient interactive and cooperative currently  Continue chronic therapy with amitriptyline, buspirone  Wellbutrin, zoloft and quetiapine also on chronic list in EPIC which will need to be confirmed with patient's pharmacy before resuming.   Continue ramelteon nightly for insomnia        Essential hypertension    Improved control with metoprolol which was started for tachycardia  Repeat EKG if HR consistently elevated  -will continue to monitor and adjust regimen as necessary        Type 2 diabetes mellitus, without long-term current use of insulin    Hemoglobin A1C   Date Value Ref Range Status   06/04/2018 6.1 (H) 4.0 - 5.6 % Final     Comment:   07/14/2016 6.4 (H) 4.5 - 6.2 % Final     Comment:     Metformin and amaryl chronically at home; controlled  Glucoses stable when admitted on a regular diet.  Continue therapy with glucose monitoring AC and HS.  Change to diabetic diet if glucoses elevated.  Hyperglycemia to be treated with SSNI prn.  Glucose goal is < 180mg/dl and avoidance of hypoglycemia.  Will continue to monitor and adjust regimen as necessary to achieve goals.            I certify that SNF services are required to be given on an inpatient basis because Olimpia Cardoza's needs for skilled nursing care and/or skilled rehabilitation are required on a daily basis and such services can only practically be provided in a skilled nursing facility setting and are for an ongoing condition for which she received inpatient care in the hospital.     No future appointments.  Patient will need to f/u with vascular neurology as per their preference.    Patient's care plan and discharge planning will be discussed by the SNF team in IDT meeting weekly. Medications to be reviewed and discussed with the SNF unit clinical pharmacist.  Allison Rajan MD  Department of Hospital Medicine  [facility] [place] [facility] - Skilled Nursing  Care

## 2018-06-10 NOTE — ASSESSMENT & PLAN NOTE
documented by ICU team likely from previous facility at [facility]  Continue baclofen and lidoderm patches to treat pain

## 2018-06-10 NOTE — ASSESSMENT & PLAN NOTE
67 year old female with left sided weakness, gaze preference, and speech diffculties found down at home, intubated and sedated prior to transfer for potential thrombectomy after tpa treatment.     - CTA: negative for LVO   - MRI: negative for diffusion restrictions   - Tele: Reviewed / Tachycardia +, No concerns for Afib / RVR   - Echo: + LA enlargements     - Unclear atherothrombotic vs embolic aborted by t-PA    Antithrombotics for secondary stroke prevention: Dcd apixaban for negative tele for Afib on review / Continue ASA     Statins for secondary stroke prevention and hyperlipidemia, if present: Atorvastatin 40 mg OD    Aggressive risk factor modification: HTN, DM, HLD     Rehab efforts: PT/OT/SLP to evaluate and treat - Recommendations [facility]      Diagnostics ordered/pending: none    VTE prophylaxis: Heparin     BP parameters: Infarct: Post tPA, SBP <180

## 2018-06-10 NOTE — ASSESSMENT & PLAN NOTE
Hx of thyroid surgery  Chronic and stable  Continue therapy with levothyroxine  F/U with PCP for ongoing monitoring and adjustment of levothyroxine dose as indicated

## 2018-06-10 NOTE — ASSESSMENT & PLAN NOTE
Continue supplemental oxygen as she uses at home.  Monitor pulse ox.   Minimize oxygen flow rate due to COPD.

## 2018-06-10 NOTE — HOSPITAL COURSE
[facility]  6/9: Patient admitted to SNF for ongoing PT/Ot following a hospitalization R MCA stroke requiring tPA.  6/11: Patient seen at bedside doing well, reports soreness to right flank area and increase cough.CXR stable from prior exam.  6/12: Patient seen at bedside, report improvement in in SOB and cough from additional dose of lasix.  6/13: Patient seen at bedside, reports increase in cough, weight increased change daily lasix to bid  6/14: patient seen at bedside, lung sounds clear, cough improved.  6/15: Patient seen at bedside, doing well, Had large BM today. lung sound clear, discussed discharge with patient and son at bedside, patient does not have her portable O2 for discharge. Patient is 90% RA at rest off of O2 for 20 mins. Patient to be assisted to car on O2. Okay to travel without O2 via car home. Family to connect portable tank once home before activity. Patient and family verbalized understanding. Patient discharged home with home health services.

## 2018-06-10 NOTE — DISCHARGE SUMMARY
[facility]  Vascular Neurology  Comprehensive Stroke Center  Discharge Summary     Summary:     Admit Date: 6/4/2018  8:06 PM    Discharge Date and Time: 6/9/2018  2:54 PM    Attending Physician: Dr Pro     Discharge Provider: XAVI Cunha    History of Present Illness: 67 year old female who was seen via telemedicine at [facility]. Upon arrival here, the patient was given 10 mg of versed and is intubated. Per telemed and EMS reports, patient was last known normal at 5 pm today when her  spoke to her on the phone and then she was found 20 minute later unreponsive with right gaze, left sided weakness. She was brought to [facility] where tpa was administered for stroke symptoms and she was intubated for airway support before arrival here. The patient was becoming more responsive and slightly agitated during transport, reported to be pulling at the tube. The patient with history of HTN, HLD, DM, smoker    No known prior history of stroke.   Transferred here for CTA MP and admission to [facility] for post tpa monitoring     Hospital Course (synopsis of major diagnoses, care, treatment, and services provided during the course of the hospital stay): 6/5/18 - improvement overnight, extubated. No noted weakness or speech difficulties   6/6/18 discussed HPI with patient's son who stated that when he found his mother down, she had BL weakness and was moving her eyes left and right. Patient denies having unilateral weakness after falling.   6/7-8: NAEON. No concerns for worsening NIHSS.   6/9/18 - rate better controlled. Back and rib pain remains- with hematoma and ecchymosis, improved with warm compresses    Patient admitted status post tpa to icu for monitoring and neuro assessments.   The patient with significant improvement in exam.  Some pain due to trauma (noted T3 compression deformity -xray) and ecchymosis to ribs due to potential rib fx post being found down .hematoma at  the site but improving with warm compresses.     Heart rate elevated during stay but was able to be controlled with medication.     Ad[facility] to [facility]    Inpatient acute stroke work up completed and patient is stable for discharge.  Please see imaging and discharge medication list below.      STROKE DOCUMENTATION   Acute Stroke Times   Last Known Normal Date: 06/04/18  Last Known Normal Time: 1700  Symptom Onset Date: 06/04/18  Symptom Onset Time: 1720  Stroke Team Called Date: 06/04/18  Stroke Team Arrival Date: 06/04/18  Stroke Team Arrival Time:  (arrived in ED at 8:07 pm, PA awaiting arrival)  CT Interpretation Time:  (initial ct read by phoebe SYKES )     NIH Scale:  1a. Level Of Consciousness: 0-->Alert: keenly responsive  1b. LOC Questions: 0-->Answers both questions correctly  1c. LOC Commands: 0-->Performs both tasks correctly  2. Best Gaze: 0-->Normal  3. Visual: 0-->No visual loss  4. Facial Palsy: 0-->Normal symmetrical movements  5a. Motor Arm, Left: 0-->No drift: limb holds 90 (or 45) degrees for full 10 secs  5b. Motor Arm, Right: 0-->No drift: limb holds 90 (or 45) degrees for full 10 secs  6a. Motor Leg, Left: 0-->No drift: leg holds 30 degree position for full 5 secs  6b. Motor Leg, Right: 0-->No drift: leg holds 30 degree position for full 5 secs  7. Limb Ataxia: 0-->Absent  8. Sensory: 0-->Normal: no sensory loss  9. Best Language: 0-->No aphasia: normal  10. Dysarthria: 0-->Normal  11. Extinction and Inattention (formerly Neglect): 0-->No abnormality  Total (NIH Stroke Scale): 0        Modified Yates Score: 2  Forrest Coma Scale:    ABCD2 Score:    [id number] Score:   HAS -BLED Score:   ICH Score:   Hunt & Kothari Classification:       Assessment/Plan:       Diagnostic Results:  CTA  6/4/18 - reviewed with Dr España in real time   Radiology read- Possible right frontal area of hypoattenuation with evaluation limited by motion artifact.  Additional left centrum semiovale area of  hypoattenuation possibly a recent or remote infarction.    Mild luminal irregularity of the right proximal MCA as seen on series 5, image 1442.  No evidence of large vessel occlusion in the intracranial circulation.    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  The overall evaluation is difficult given motion within this region.  Outpatient repeat CTA of the head is suggested for further evaluation.    No hemodynamically significant stenosis in the neck vessels.     MRI:   Noncontrast MRI of the brain demonstrates no acute hemorrhage or infarct.    Mild chronic microvascular ischemic changes in the supratentorial white matter.     Echo   CONCLUSIONS     1 - Normal left ventricular systolic function (EF 65-70%).     2 - Indeterminate LV diastolic function.     3 - Normal right ventricular systolic function .     4 - Pulmonary hypertension. The estimated PA systolic pressure is 51 mmHg.     5 - Trivial tricuspid regurgitation.     6 - Moderate left atrial enlargement.     7 - Concentric remodeling.     8 - No wall motion abnormalities.      Interventions: IV t-PA    Complications: None    Disposition: Skilled Nursing Facility    Final Active Diagnoses:    Diagnosis Date Noted POA    PRINCIPAL PROBLEM:  Right MCA ischemic stroke, aborted by t-PA [I63.411] 06/04/2018 Yes    COPD (chronic obstructive pulmonary disease) [J44.9] 06/08/2018 Yes    Other specified hypothyroidism [E03.8] 06/05/2018 Unknown    Neck pain [M54.2] 06/05/2018 Yes    Left-sided weakness [R53.1] 06/04/2018 Unknown    Dyslipidemia [E78.5] 03/10/2015 Yes    Obesity due to excess calories [E66.09] 03/10/2015 Yes    Type 2 diabetes mellitus, without long-term current use of insulin [E11.9] 02/24/2015 Yes    Essential hypertension [I10] 02/24/2015 Yes    Depression [F32.9] 02/24/2015 Yes    Hepatomegaly [R16.0] 02/24/2015 Yes      Problems Resolved During this Admission:    Diagnosis Date Noted Date Resolved POA     * Right MCA ischemic  stroke, aborted by t-PA    67 year old female with left sided weakness, gaze preference, and speech diffculties found down at home, intubated and sedated prior to transfer for potential thrombectomy after tpa treatment.     - CTA: negative for LVO   - MRI: negative for diffusion restrictions   - Tele: Reviewed / Tachycardia +, No concerns for Afib / RVR   - Echo: + LA enlargements     - Unclear atherothrombotic vs embolic aborted by t-PA    Antithrombotics for secondary stroke prevention: Dcd apixaban for negative tele for Afib on review / Continue ASA     Statins for secondary stroke prevention and hyperlipidemia, if present: Atorvastatin 40 mg OD    Aggressive risk factor modification: HTN, DM, HLD     Rehab efforts: PT/OT/SLP to evaluate and treat - Recommendations [facility]      Diagnostics ordered/pending: none    VTE prophylaxis: Heparin     BP parameters: Infarct: Post tPA, SBP <180        Neck pain    Wearing c collar     MRI neck significantly motion limited but no abnormality seen  - F/u xray cervical spine negative  - ? Compression # in T3  - F/u ortho op basis / No active management / conservative         Other specified hypothyroidism    Continue synthroid        COPD (chronic obstructive pulmonary disease)    Stating well on 2L NC  - scheduled duo-nebs         Left-sided weakness    - resolved         Dyslipidemia    Takes fenofibrate at home  No statin on home meds    LDL 51  - Initiated atorvastatin 20 mg         Obesity due to excess calories    Stroke risk factor  counseling           Depression    - continue elavil         Essential hypertension    Stroke risk factor  SBP < 180 post tpa     - On BB         Type 2 diabetes mellitus, without long-term current use of insulin    Stroke risk factor, SSI while inpatient  On metformin at home          Hepatomegaly    Outpatient follow up             Recommendations:     Post-discharge complication risks: Falls, seizure    Stroke Education given to:  patient and family    Follow-up in Stroke Clinic in 4-6 weeks  PCP in one week   Hepatology and ortho outpatient     Discharge Plan:  Antithrombotics: Aspirin   Statin: Atorvastatin 40mg  Aggresive risk factor modification:  Hypertension  Diabetes  High Cholesterol  Diet  Exercise  Obesity    Follow Up:      Patient Instructions:   No discharge procedures on file.    Medications:  Transfer Medications (for Discharge Readmit only):   No current facility-administered medications for this encounter.      No current outpatient prescriptions on file.     Facility-Administered Medications Ordered in Other Encounters   Medication Dose Route Frequency Provider Last Rate Last Dose    acetaminophen tablet 650 mg  650 mg Oral Q6H PRN XAVI Mckeon        albuterol-ipratropium 2.5 mg-0.5 mg/3 mL nebulizer solution 3 mL  3 mL Nebulization Q8H XAVI Mckeon   3 mL at 06/10/18 1549    amitriptyline tablet 50 mg  50 mg Oral QHS XAVI Mckeon   50 mg at 06/09/18 2143    aspirin chewable tablet 81 mg  81 mg Oral Daily XAVI Mckeon   81 mg at 06/10/18 0919    atorvastatin tablet 20 mg  20 mg Oral Daily XAVI Mckeon   20 mg at 06/10/18 0919    baclofen split tablet 5 mg  5 mg Oral QID XAVI Mcekon   5 mg at 06/10/18 1753    busPIRone tablet 10 mg  10 mg Oral BID XAVI Mckeon   10 mg at 06/10/18 0920    calcium carbonate 200 mg calcium (500 mg) chewable tablet 500 mg  500 mg Oral BID PRN XAVI Mckeon        dextrose 50% injection 12.5 g  12.5 g Intravenous PRN XAVI Mckeon        dextrose 50% injection 25 g  25 g Intravenous PRN XAVI Mckeon        [START ON 6/11/2018] furosemide tablet 20 mg  20 mg Oral Daily Allison Rajan MD        glucagon (human recombinant) injection 1 mg  1 mg Intramuscular PRN XAVI Mckeon        glucose chewable tablet 24 g  24 g Oral PRN XAVI Mckeon        guaiFENesin 12 hr tablet 600 mg  600 mg Oral BID Allison SOTELO  MD Mohini        heparin (porcine) injection 5,000 Units  5,000 Units Subcutaneous Q8H XAVI Mckeon   5,000 Units at 06/10/18 1413    HYDROcodone-acetaminophen 5-325 mg per tablet 1 tablet  1 tablet Oral Q6H PRN XAVI Mckeon   1 tablet at 06/10/18 0534    hydrOXYzine HCl tablet 10 mg  10 mg Oral QID PRN Allison Rajan MD        insulin aspart U-100 pen 1-10 Units  1-10 Units Subcutaneous Q6H PRN XAVI Mckeon        levothyroxine tablet 175 mcg  175 mcg Oral Daily XAVI Mckeon   175 mcg at 06/10/18 0532    lidocaine 5 % patch 1 patch  1 patch Transdermal Q24H XAVI Mckeon   1 patch at 06/10/18 1413    metoprolol tartrate (LOPRESSOR) tablet 25 mg  25 mg Oral Q8H XAVI Mckeon   25 mg at 06/10/18 1408    ondansetron disintegrating tablet 8 mg  8 mg Oral Q8H PRN XAVI Mckeon        ramelteon tablet 8 mg  8 mg Oral Nightly PRN XAVI Mckeon        ramelteon tablet 8 mg  8 mg Oral QHS XAVI Mckeon   8 mg at 06/09/18 2140    senna-docusate 8.6-50 mg per tablet 1 tablet  1 tablet Oral BID Allison Rajan MD        sodium chloride 0.9% flush 3 mL  3 mL Intravenous Q8H XAVI Mckeon        [START ON 6/11/2018] tiotropium inhalation capsule 18 mcg  1 capsule Inhalation Daily MD Carol Ann Heart PA  [facility]  Department of Vascular Neurology   [facility]

## 2018-06-10 NOTE — PLAN OF CARE
Problem: Physical Therapy Goal  Goal: Physical Therapy Goal  Goals to be met by: 8 days     Patient will increase functional independence with mobility by performin. Supine to sit (HOB>30 degrees due to rib pain) with supervision.  2. Sit to supine (HOB>30 degrees due to rib pain) with supervision.  3. Sit to stand transfer with Supervision  4. Bed to chair transfer with Supervision using Rolling Walker  5. Gait  x 300 feet with Supervision using Rolling Walker with reciprocal gait pattern.   6. Ascend/descend 4 stairs with bilateral Handrails Stand-by to denote sufficient strength for stair negotiation in the community.  7. Ascend/Descend 4 inch curb step with Stand-by Assistance using Rolling Walker.  8. Stand for 5 minutes with Supervision using Rolling Walker and/or UE support on stable surface while performing an activity.  9. Lower extremity exercise program x 20 reps per handout, with independence.  10. Pt will improve gait speed, to denote a lower fall risk and improvement in balance, from 0.34 m/s to 0.47m/s with use of a rolling walker.       Outcome: Ongoing (interventions implemented as appropriate)  Goals set for today.

## 2018-06-10 NOTE — SUBJECTIVE & OBJECTIVE
Past Medical History:   Diagnosis Date    A-fib 6/7/2018    CVA (cerebral vascular accident) 6/4/2018    Depression 2/24/2015    Diabetes mellitus     Emphysema 2/24/2015    Hx smoking; home oxygen 2 l for past 4 years    Encounter for blood transfusion     Hepatomegaly 2/24/2015    Found on Routine imaging    HTN (hypertension) 2/24/2015    Hyperlipidemia     Other specified hypothyroidism 6/5/2018    Sleep apnea 2/24/2015       Past Surgical History:   Procedure Laterality Date    CARDIAC CATHETERIZATION      CHOLECYSTECTOMY      dislocated hip      traction    HEMORRHOID SURGERY      HERNIA REPAIR      with mesh    NISSEN FUNDOPLICATION      THYROID SURGERY         Review of patient's allergies indicates:   Allergen Reactions    Codeine Palpitations       No current facility-administered medications on file prior to encounter.      Current Outpatient Prescriptions on File Prior to Encounter   Medication Sig    albuterol (PROVENTIL) 5 mg/mL nebulizer solution Take 2.5 mg by nebulization 3 (three) times daily.     alendronate (FOSAMAX) 70 MG tablet Take 70 mg by mouth every 7 days.    ALPRAZolam (XANAX) 0.25 MG tablet Take 0.25 mg by mouth 2 (two) times daily as needed for Anxiety.    amitriptyline (ELAVIL) 50 MG tablet Take 50 mg by mouth every evening.    ascorbic acid (VITAMIN C) 500 MG tablet Take 500 mg by mouth once daily.    aspirin 81 MG Chew Take 81 mg by mouth every evening. Two 81mg  Once a day    atorvastatin (LIPITOR) 20 MG tablet Take 1 tablet (20 mg total) by mouth once daily.    baclofen (LIORESAL) 10 MG tablet Take 10 mg by mouth 4 (four) times daily.    buPROPion (WELLBUTRIN XL) 300 MG 24 hr tablet Take 300 mg by mouth once daily.    busPIRone (BUSPAR) 30 MG Tab Take 30 mg by mouth 2 (two) times daily.    calcium carbonate (OS-CAREY) 600 mg (1,500 mg) Tab Take 600 mg by mouth 2 (two) times daily with meals.    citalopram (CELEXA) 40 MG tablet Take 40 mg by mouth once  daily. 1/2 tablet twice daily    furosemide (LASIX) 20 MG tablet Take 1 tablet (20 mg total) by mouth 2 (two) times daily. (Patient taking differently: Take 20 mg by mouth once daily. )    glimepiride (AMARYL) 2 MG tablet Take 2 mg by mouth before breakfast.    hydrOXYzine (ATARAX) 50 MG tablet Take 50 mg by mouth 4 (four) times daily as needed.     ipratropium (ATROVENT HFA) 17 mcg/actuation inhaler Inhale 2 puffs into the lungs 4 (four) times daily.    levothyroxine (SYNTHROID, LEVOTHROID) 175 MCG tablet Take 175 mcg by mouth once daily.    meloxicam (MOBIC) 7.5 MG tablet Take 7.5 mg by mouth once daily.    metFORMIN (GLUMETZA) 1000 MG (MOD) 24 hr tablet Take 1,000 mg by mouth daily with breakfast.    metoprolol tartrate (LOPRESSOR) 25 MG tablet Take 1 tablet (25 mg total) by mouth every 8 (eight) hours.    pantoprazole (PROTONIX) 40 MG tablet Take 40 mg by mouth once daily.    quetiapine (SEROQUEL) 100 MG Tab Take 100 mg by mouth 2 (two) times daily.     QUEtiapine (SEROQUEL) 200 MG Tab Take 200 mg by mouth 2 (two) times daily.    sertraline (ZOLOFT) 50 MG tablet Take 50 mg by mouth once daily.    tiotropium (SPIRIVA) 18 mcg inhalation capsule Inhale 18 mcg into the lungs once daily.     Family History     Problem Relation (Age of Onset)    Coronary artery disease Father, Brother    Emphysema Mother    Lung cancer Mother    No Known Problems Sister, Sister, Sister, Son, Son, Son, Maternal Grandmother, Maternal Grandfather, Paternal Grandmother, Paternal Grandfather, Maternal Aunt, Maternal Uncle, Paternal Aunt, Paternal Uncle    SIDS Daughter    Stroke Brother        Social History Main Topics    Smoking status: Former Smoker     Packs/day: 3.00     Years: 47.00     Quit date: 3/21/2012    Smokeless tobacco: Not on file    Alcohol use No    Drug use: No    Sexual activity: Not on file     Review of Systems   Constitutional: no fever or chills  Eyes: no visual changes  ENT: no nasal congestion or  sore throat  Respiratory: + cough; no shortness of breath  Cardiovascular: no chest pain or palpitations  Gastrointestinal: no nausea or vomiting, no abdominal pain; last BM: 6/9  Genitourinary: no hematuria or dysuria  Integument/Breast: no rash or pruritis  Hematologic/Lymphatic: no easy bruising or lymphadenopathy  Allergy/Immunology: no postnasal drip  Musculoskeletal: + rib pain  Neurological: no seizures or tremors  Behavioral/Psych: no auditory or visual hallucinations  Endocrine: no heat or cold intolerance        Objective:     Vital Signs (Most Recent):  Temp: 97.6 °F (36.4 °C) (06/10/18 0830)  Pulse: 77 (06/10/18 1549)  Resp: 16 (06/10/18 1549)  BP: 110/70 (06/10/18 1408)  SpO2: 100 % (06/10/18 1549) Vital Signs (24h Range):  Temp:  [97.6 °F (36.4 °C)-98.1 °F (36.7 °C)] 97.6 °F (36.4 °C)  Pulse:  [] 77  Resp:  [16-20] 16  SpO2:  [92 %-100 %] 100 %  BP: (110-159)/(64-79) 110/70     Weight: 74.7 kg (164 lb 10.9 oz)  Body mass index is 31.12 kg/m².    Physical Exam  General: well developed, well nourished, no distress, seated in recliner  HENT: Head:normocephalic, atraumatic. Ears:bilateral external ear canals normal. Nose: Nares normal. Septum midline. Mucosa normal. Throat: lips, mucosa, and tongue normal and no throat erythema.  Eyes: conjunctivae/corneas clear.  EOM normal.  Neck: supple, symmetrical, trachea midline.  Lungs:  clear to auscultation bilaterally and normal respiratory effort  Cardiovascular: Heart: RRR with ectopy, no murmur, click, rub or gallop. Chest Wall: + tenderness to right and back. Extremities: no cyanosis, no edema, no clubbing. Pulses: 2+ and symmetric.  Abdomen/Rectal: Abdomen: soft, non-tender non-distended; bowel sounds normal; no masses,  no organomegaly.   Skin: extensive bruising to BUE, right> left chest; hematoma to below right costal margin  Musculoskeletal:no clubbing, cyanosis  Lymph Nodes: No cervical or supraclavicular adenopathy  Neurologic: Normal strength  and tone. Hand  4/5 on left  Psych/Behavioral:  Alert and oriented, appropriate affect.    Significant Labs:     Recent Labs  Lab 06/07/18  0558 06/08/18  0434 06/09/18  0419   WBC 8.13 7.47 5.97   HGB 8.9* 7.9* 8.5*   HCT 28.1* 24.8* 26.5*    210 244         Recent Labs  Lab 06/07/18  0558 06/08/18  0434 06/09/18  0419    139 140   K 4.9 5.1 4.5   * 109 106   CO2 20* 22* 25   BUN 16 15 14   CREATININE 1.0 0.9 0.9   CALCIUM 7.6* 7.8* 7.7*   PROT 6.1 5.8* 5.7*   BILITOT 0.4 0.4 0.4   ALKPHOS 105 93 95   ALT 13 12 11   AST 14 13 12         Significant Imaging:     CXR 6/8/2018 1 view  FINDINGS:  Mild cardiomegaly.  Opacification at the right lung base probably a combination of pleural effusion and volume loss.  The left lung is clear.  No extrapulmonary air identified

## 2018-06-11 ENCOUNTER — PATIENT OUTREACH (OUTPATIENT)
Dept: ADMINISTRATIVE | Facility: CLINIC | Age: 68
End: 2018-06-11

## 2018-06-11 LAB
ANION GAP SERPL CALC-SCNC: 11 MMOL/L
BASOPHILS # BLD AUTO: 0.02 K/UL
BASOPHILS NFR BLD: 0.3 %
BUN SERPL-MCNC: 15 MG/DL
CALCIUM SERPL-MCNC: 7.6 MG/DL
CHLORIDE SERPL-SCNC: 103 MMOL/L
CO2 SERPL-SCNC: 31 MMOL/L
CREAT SERPL-MCNC: 1.1 MG/DL
DIFFERENTIAL METHOD: ABNORMAL
EOSINOPHIL # BLD AUTO: 0.2 K/UL
EOSINOPHIL NFR BLD: 2.4 %
ERYTHROCYTE [DISTWIDTH] IN BLOOD BY AUTOMATED COUNT: 14.9 %
EST. GFR  (AFRICAN AMERICAN): 59.6 ML/MIN/1.73 M^2
EST. GFR  (NON AFRICAN AMERICAN): 51.7 ML/MIN/1.73 M^2
GLUCOSE SERPL-MCNC: 119 MG/DL
HCT VFR BLD AUTO: 26 %
HGB BLD-MCNC: 8.4 G/DL
IMM GRANULOCYTES # BLD AUTO: 0.12 K/UL
IMM GRANULOCYTES NFR BLD AUTO: 1.9 %
LYMPHOCYTES # BLD AUTO: 1.9 K/UL
LYMPHOCYTES NFR BLD: 30 %
MAGNESIUM SERPL-MCNC: 0.9 MG/DL
MCH RBC QN AUTO: 28.4 PG
MCHC RBC AUTO-ENTMCNC: 32.3 G/DL
MCV RBC AUTO: 88 FL
MONOCYTES # BLD AUTO: 0.8 K/UL
MONOCYTES NFR BLD: 12 %
NEUTROPHILS # BLD AUTO: 3.4 K/UL
NEUTROPHILS NFR BLD: 53.4 %
NRBC BLD-RTO: 0 /100 WBC
PHOSPHATE SERPL-MCNC: 3.9 MG/DL
PLATELET # BLD AUTO: 275 K/UL
PMV BLD AUTO: 9.9 FL
POCT GLUCOSE: 111 MG/DL (ref 70–110)
POCT GLUCOSE: 113 MG/DL (ref 70–110)
POCT GLUCOSE: 118 MG/DL (ref 70–110)
POCT GLUCOSE: 121 MG/DL (ref 70–110)
POCT GLUCOSE: 132 MG/DL (ref 70–110)
POTASSIUM SERPL-SCNC: 4.1 MMOL/L
RBC # BLD AUTO: 2.96 M/UL
SODIUM SERPL-SCNC: 145 MMOL/L
WBC # BLD AUTO: 6.33 K/UL

## 2018-06-11 PROCEDURE — 25000003 PHARM REV CODE 250: Performed by: PHYSICIAN ASSISTANT

## 2018-06-11 PROCEDURE — 97530 THERAPEUTIC ACTIVITIES: CPT

## 2018-06-11 PROCEDURE — 84100 ASSAY OF PHOSPHORUS: CPT

## 2018-06-11 PROCEDURE — 36415 COLL VENOUS BLD VENIPUNCTURE: CPT

## 2018-06-11 PROCEDURE — 80048 BASIC METABOLIC PNL TOTAL CA: CPT

## 2018-06-11 PROCEDURE — A4216 STERILE WATER/SALINE, 10 ML: HCPCS | Performed by: PHYSICIAN ASSISTANT

## 2018-06-11 PROCEDURE — 27000221 HC OXYGEN, UP TO 24 HOURS

## 2018-06-11 PROCEDURE — 25000242 PHARM REV CODE 250 ALT 637 W/ HCPCS: Performed by: INTERNAL MEDICINE

## 2018-06-11 PROCEDURE — 92523 SPEECH SOUND LANG COMPREHEN: CPT

## 2018-06-11 PROCEDURE — 94640 AIRWAY INHALATION TREATMENT: CPT

## 2018-06-11 PROCEDURE — 25000003 PHARM REV CODE 250: Performed by: INTERNAL MEDICINE

## 2018-06-11 PROCEDURE — 25000003 PHARM REV CODE 250: Performed by: NURSE PRACTITIONER

## 2018-06-11 PROCEDURE — 63600175 PHARM REV CODE 636 W HCPCS: Performed by: NURSE PRACTITIONER

## 2018-06-11 PROCEDURE — 83735 ASSAY OF MAGNESIUM: CPT

## 2018-06-11 PROCEDURE — 63600175 PHARM REV CODE 636 W HCPCS: Performed by: PHYSICIAN ASSISTANT

## 2018-06-11 PROCEDURE — 85025 COMPLETE CBC W/AUTO DIFF WBC: CPT

## 2018-06-11 PROCEDURE — 94664 DEMO&/EVAL PT USE INHALER: CPT

## 2018-06-11 PROCEDURE — 25000242 PHARM REV CODE 250 ALT 637 W/ HCPCS: Performed by: PHYSICIAN ASSISTANT

## 2018-06-11 PROCEDURE — 97116 GAIT TRAINING THERAPY: CPT

## 2018-06-11 PROCEDURE — 92610 EVALUATE SWALLOWING FUNCTION: CPT

## 2018-06-11 PROCEDURE — 99900035 HC TECH TIME PER 15 MIN (STAT)

## 2018-06-11 PROCEDURE — 97802 MEDICAL NUTRITION INDIV IN: CPT

## 2018-06-11 PROCEDURE — 94761 N-INVAS EAR/PLS OXIMETRY MLT: CPT

## 2018-06-11 PROCEDURE — 99309 SBSQ NF CARE MODERATE MDM 30: CPT | Mod: ,,, | Performed by: NURSE PRACTITIONER

## 2018-06-11 PROCEDURE — 97110 THERAPEUTIC EXERCISES: CPT

## 2018-06-11 PROCEDURE — 11000004 HC SNF PRIVATE

## 2018-06-11 RX ORDER — QUETIAPINE FUMARATE 25 MG/1
100 TABLET, FILM COATED ORAL NIGHTLY
Status: DISCONTINUED | OUTPATIENT
Start: 2018-06-11 | End: 2018-06-15 | Stop reason: HOSPADM

## 2018-06-11 RX ORDER — ESCITALOPRAM OXALATE 10 MG/1
10 TABLET ORAL DAILY
Status: DISCONTINUED | OUTPATIENT
Start: 2018-06-11 | End: 2018-06-15 | Stop reason: HOSPADM

## 2018-06-11 RX ORDER — MAGNESIUM SULFATE HEPTAHYDRATE 40 MG/ML
2 INJECTION, SOLUTION INTRAVENOUS ONCE
Status: COMPLETED | OUTPATIENT
Start: 2018-06-11 | End: 2018-06-11

## 2018-06-11 RX ORDER — BUPROPION HYDROCHLORIDE 150 MG/1
150 TABLET ORAL DAILY
Status: DISCONTINUED | OUTPATIENT
Start: 2018-06-11 | End: 2018-06-15 | Stop reason: HOSPADM

## 2018-06-11 RX ORDER — FUROSEMIDE 20 MG/1
20 TABLET ORAL ONCE
Status: COMPLETED | OUTPATIENT
Start: 2018-06-11 | End: 2018-06-11

## 2018-06-11 RX ORDER — FLUTICASONE FUROATE AND VILANTEROL 200; 25 UG/1; UG/1
1 POWDER RESPIRATORY (INHALATION) DAILY
Status: DISCONTINUED | OUTPATIENT
Start: 2018-06-11 | End: 2018-06-15 | Stop reason: HOSPADM

## 2018-06-11 RX ADMIN — HEPARIN SODIUM 5000 UNITS: 5000 INJECTION, SOLUTION INTRAVENOUS; SUBCUTANEOUS at 06:06

## 2018-06-11 RX ADMIN — BACLOFEN 5 MG: 10 TABLET ORAL at 09:06

## 2018-06-11 RX ADMIN — FUROSEMIDE 20 MG: 20 TABLET ORAL at 07:06

## 2018-06-11 RX ADMIN — HYDROCODONE BITARTRATE AND ACETAMINOPHEN 1 TABLET: 5; 325 TABLET ORAL at 06:06

## 2018-06-11 RX ADMIN — GUAIFENESIN AND DEXTROMETHORPHAN HYDROBROMIDE 1 TABLET: 600; 30 TABLET, EXTENDED RELEASE ORAL at 09:06

## 2018-06-11 RX ADMIN — FUROSEMIDE 20 MG: 20 TABLET ORAL at 09:06

## 2018-06-11 RX ADMIN — METOPROLOL TARTRATE 25 MG: 25 TABLET ORAL at 02:06

## 2018-06-11 RX ADMIN — ASPIRIN 81 MG CHEWABLE TABLET 81 MG: 81 TABLET CHEWABLE at 09:06

## 2018-06-11 RX ADMIN — IPRATROPIUM BROMIDE AND ALBUTEROL SULFATE 3 ML: .5; 3 SOLUTION RESPIRATORY (INHALATION) at 07:06

## 2018-06-11 RX ADMIN — AMITRIPTYLINE HYDROCHLORIDE 50 MG: 50 TABLET, FILM COATED ORAL at 09:06

## 2018-06-11 RX ADMIN — Medication 3 ML: at 04:06

## 2018-06-11 RX ADMIN — HYDROCODONE BITARTRATE AND ACETAMINOPHEN 1 TABLET: 5; 325 TABLET ORAL at 07:06

## 2018-06-11 RX ADMIN — HEPARIN SODIUM 5000 UNITS: 5000 INJECTION, SOLUTION INTRAVENOUS; SUBCUTANEOUS at 10:06

## 2018-06-11 RX ADMIN — Medication 3 ML: at 10:06

## 2018-06-11 RX ADMIN — ESCITALOPRAM OXALATE 10 MG: 10 TABLET ORAL at 04:06

## 2018-06-11 RX ADMIN — LIDOCAINE 1 PATCH: 50 PATCH TOPICAL at 02:06

## 2018-06-11 RX ADMIN — STANDARDIZED SENNA CONCENTRATE AND DOCUSATE SODIUM 1 TABLET: 8.6; 5 TABLET, FILM COATED ORAL at 09:06

## 2018-06-11 RX ADMIN — GUAIFENESIN 600 MG: 600 TABLET, EXTENDED RELEASE ORAL at 09:06

## 2018-06-11 RX ADMIN — HEPARIN SODIUM 5000 UNITS: 5000 INJECTION, SOLUTION INTRAVENOUS; SUBCUTANEOUS at 02:06

## 2018-06-11 RX ADMIN — METOPROLOL TARTRATE 25 MG: 25 TABLET ORAL at 09:06

## 2018-06-11 RX ADMIN — ATORVASTATIN CALCIUM 20 MG: 20 TABLET, FILM COATED ORAL at 09:06

## 2018-06-11 RX ADMIN — IPRATROPIUM BROMIDE AND ALBUTEROL SULFATE 3 ML: .5; 3 SOLUTION RESPIRATORY (INHALATION) at 12:06

## 2018-06-11 RX ADMIN — BACLOFEN 5 MG: 10 TABLET ORAL at 02:06

## 2018-06-11 RX ADMIN — BUSPIRONE HYDROCHLORIDE 5 MG: 5 TABLET ORAL at 09:06

## 2018-06-11 RX ADMIN — HYDROCODONE BITARTRATE AND ACETAMINOPHEN 1 TABLET: 5; 325 TABLET ORAL at 12:06

## 2018-06-11 RX ADMIN — BUSPIRONE HYDROCHLORIDE 10 MG: 10 TABLET ORAL at 09:06

## 2018-06-11 RX ADMIN — LEVOTHYROXINE SODIUM 175 MCG: 100 TABLET ORAL at 06:06

## 2018-06-11 RX ADMIN — IPRATROPIUM BROMIDE AND ALBUTEROL SULFATE 3 ML: .5; 3 SOLUTION RESPIRATORY (INHALATION) at 11:06

## 2018-06-11 RX ADMIN — METOPROLOL TARTRATE 25 MG: 25 TABLET ORAL at 06:06

## 2018-06-11 RX ADMIN — BUPROPION HYDROCHLORIDE 150 MG: 150 TABLET, EXTENDED RELEASE ORAL at 04:06

## 2018-06-11 RX ADMIN — QUETIAPINE FUMARATE 100 MG: 25 TABLET ORAL at 09:06

## 2018-06-11 RX ADMIN — IPRATROPIUM BROMIDE AND ALBUTEROL SULFATE 3 ML: .5; 3 SOLUTION RESPIRATORY (INHALATION) at 04:06

## 2018-06-11 RX ADMIN — BACLOFEN 5 MG: 10 TABLET ORAL at 04:06

## 2018-06-11 RX ADMIN — BACLOFEN 5 MG: 10 TABLET ORAL at 10:06

## 2018-06-11 RX ADMIN — RAMELTEON 8 MG: 8 TABLET, FILM COATED ORAL at 09:06

## 2018-06-11 RX ADMIN — MAGNESIUM SULFATE IN WATER 2 G: 40 INJECTION, SOLUTION INTRAVENOUS at 02:06

## 2018-06-11 RX ADMIN — TIOTROPIUM BROMIDE 18 MCG: 18 CAPSULE ORAL; RESPIRATORY (INHALATION) at 10:06

## 2018-06-11 NOTE — PLAN OF CARE
Problem: Occupational Therapy Goal  Goal: Occupational Therapy Goal  Goals to be met by: 8 days     Patient will increase functional independence with ADLs by performing:    UE Dressing with Modified [place].  LE Dressing with Modified [place].  Grooming while standing at sink with Supervision.  Toileting from toilet with Modified [place] for hygiene and clothing management.   Bathing from  shower chair/bench with Supervision.  Supine to sit with Modified [place] /c HOB flat and no handrails.  Stand pivot transfers with Modified [place].  Toilet transfer to toilet with Modified [place].  Upper extremity exercise program x 15 reps per handout, with independence.  Patient will complete a functional dynamic standing activity x 10 min with S in order to complete a functional household task.      Outcome: Ongoing (interventions implemented as appropriate)  Patient's goals are appropriate.   SANAZ Ziegler  6/11/2018

## 2018-06-11 NOTE — PT/OT/SLP PROGRESS
Occupational Therapy  Treatment    Olimpia Cardoza   MRN: [id number]   Admitting Diagnosis: Embolic stroke involving right middle cerebral artery    OT Date of Treatment: 06/11/18  Total Time (min): 40 min    Billable Minutes:  Therapeutic Activity 10 and Therapeutic Exercise 30    General Precautions: Standard, aspiration, fall, respiratory  Orthopedic Precautions: N/A  Braces: N/A         Subjective:  Communicated with patient prior to session.    Pain/Comfort  Pain Rating 1: 10/10  Location - Side 1: Right  Location - Orientation 1: posterior  Pain Addressed 1: Reposition, Distraction  Pain Rating Post-Intervention 1: 10/10    Objective:       Functional Status:  MDS G  Transfer Functional Status: S bedside chair>w/c stand pivot       AMPA 6 Click:  [facility] Total Score: 19    OT Exercises: Patient performed L UE ROM exercises using 1# free weight in all planes and joints focusing to improve strength and endurance to increase independence with ADLs. (R UE was not used only because nursing was having difficulty with IV in hand. Patient needed the medicine and it began leaking).    Additional Treatment:  Patient performed a visual perception activity using pegs and pegboard while matching a pattern standing with S at table top in order to stand for personal hygiene tasks.     Patient left up in chair with Speech therapy    ASSESSMENT:  Olimpia Cardoza is a 67 y.o. female with a medical diagnosis of Embolic stroke involving right middle cerebral artery. Patient tolerated treatment session, but patient was tied up most of the afternoon with nursing trying to start an IV. When patient was able to participate in therapy, the IV was leaking and nursing fixed the IV. Nursing requested to limit usage of R UE during tasks. Patient was motivated to participate in therapy and continues to benefit from skilled OT services to achieve maximal independence.    Rehab identified problem list/impairments: weakness, impaired endurance,  impaired self care skills, gait instability, impaired functional mobilty, impaired balance, pain    Rehab potential is good    Activity tolerance: Good    Discharge recommendations: home with home health     Barriers to discharge: None     Equipment recommendations: bedside commode     GOALS:    Occupational Therapy Goals        Problem: Occupational Therapy Goal    Goal Priority Disciplines Outcome Interventions   Occupational Therapy Goal     OT, PT/OT Ongoing (interventions implemented as appropriate)    Description:  Goals to be met by: 8 days     Patient will increase functional independence with ADLs by performing:    UE Dressing with Modified [place].  LE Dressing with Modified [place].  Grooming while standing at sink with Supervision.  Toileting from toilet with Modified [place] for hygiene and clothing management.   Bathing from  shower chair/bench with Supervision.  Supine to sit with Modified [place] /c HOB flat and no handrails.  Stand pivot transfers with Modified [place].  Toilet transfer to toilet with Modified [place].  Upper extremity exercise program x 15 reps per handout, with independence.  Patient will complete a functional dynamic standing activity x 10 min with S in order to complete a functional household task.                       Plan:  Patient to be seen 5 x/week to address the above listed problems via self-care/home management, therapeutic activities, therapeutic exercises  Plan of Care expires: 07/10/18  Plan of Care reviewed with: patient, son    Cherri CONTRERAS SANAZ Morgan  06/11/2018

## 2018-06-11 NOTE — ASSESSMENT & PLAN NOTE
Evaluated on 6/11/18  · Suspect some blood loss with hematoma and extensive bruising  · Continue to monitor with twice weekly labs

## 2018-06-11 NOTE — PROGRESS NOTES
[facility] - Skilled Nursing Care  Department of Hospital Medicine  Progress Note    Patient Name: Olimpia Cardoza  MRN: [id number]  Code Status: Full Code  Admission Date: 6/9/2018  Length of Stay: 2 days  Attending Physician: Allison Rajan MD  Primary Care Provider: Shad Burkett MD    Subjective:     Principal Problem:Embolic stroke involving right middle cerebral artery    Chief Complaint/Reason for Admission: CVA (cerebral vascular accident)    History of Present Illness:  Patient is a 67 y.o. female with HTN, HLP, DM2, COPD who presents to SNF after hospitalization for acute stroke requiring tPA.  She remains with minimal left sided weakness. She was wearing a cervical collar due to neck pain with negative x-rays and a poor cervical spine MRI with no gross abnormalities which was discontinued prior to discharge.  She has extensive bruising and has history of a fall prior to being found unresponsive.  Rib fractures present on imaging.  She complains of a cough with thick sputum. She has a chronic cough at home with green sputum but not as milky like currently.  She has chronic MUÑOZ which is stable and wears oxygen at home. The patient has been admitted to SNF for ongoing PT/OT due to insufficient progress to go home safely from the hospital.    Hospital Course:  The patient was admitted at [facility] from 6/4 to 6/9/2018.  6/9: Patient admitted to SNF for ongoing PT/Ot following a hospitalization R MCA stroke requiring tPA.  6/11: Patient seen at bedside doing well, reports soreness to right flank area and increase cough.    Interval History: Patient seen at bedside, on supplemental O2, son at bedside, answered all questions.     Review of Systems   Constitutional: Negative for appetite change, chills, fatigue and fever.   HENT: Negative for trouble swallowing.    Respiratory: Positive for cough. Negative for chest tightness, shortness of breath and wheezing.    Cardiovascular: Negative for chest pain,  palpitations and leg swelling.        + right flank pain   Gastrointestinal: Negative for abdominal pain, constipation, diarrhea and nausea.   Genitourinary: Negative for difficulty urinating, frequency and urgency.   Musculoskeletal: Negative for arthralgias and myalgias.   Skin: Negative for rash.   Neurological: Negative for dizziness, weakness, light-headedness and headaches.   Psychiatric/Behavioral: Negative for sleep disturbance.     Scheduled Meds:   albuterol-ipratropium  3 mL Nebulization Q8H    amitriptyline  50 mg Oral QHS    aspirin  81 mg Oral Daily    atorvastatin  20 mg Oral Daily    baclofen  5 mg Oral QID    buPROPion  150 mg Oral Daily    busPIRone  5 mg Oral BID    dextromethorphan-guaifenesin  mg  1 tablet Oral BID    escitalopram oxalate  10 mg Oral Daily    fluticasone-vilanterol  1 puff Inhalation Daily    furosemide  20 mg Oral Daily    furosemide  20 mg Oral Once    heparin (porcine)  5,000 Units Subcutaneous Q8H    levothyroxine  175 mcg Oral Daily    lidocaine  1 patch Transdermal Q24H    metoprolol tartrate  25 mg Oral Q8H    QUEtiapine  100 mg Oral QHS    ramelteon  8 mg Oral QHS    senna-docusate 8.6-50 mg  1 tablet Oral BID    sodium chloride 0.9%  3 mL Intravenous Q8H    tiotropium  1 capsule Inhalation Daily     Continuous Infusions:  PRN Meds:.acetaminophen, calcium carbonate, dextrose 50%, dextrose 50%, glucagon (human recombinant), glucose, HYDROcodone-acetaminophen, hydrOXYzine, insulin aspart U-100, ondansetron, ramelteon    Objective:     Vital Signs (Most Recent):  Temp: 97.7 °F (36.5 °C) (06/11/18 0753)  Pulse: 80 (06/11/18 1619)  Resp: 18 (06/11/18 1619)  BP: 125/60 (06/11/18 1413)  SpO2: 97 % (06/11/18 1619) Vital Signs (24h Range):  Temp:  [97.7 °F (36.5 °C)-99.1 °F (37.3 °C)] 97.7 °F (36.5 °C)  Pulse:  [] 80  Resp:  [18-20] 18  SpO2:  [88 %-97 %] 97 %  BP: (125-150)/(60-70) 125/60     Weight: 71.7 kg (158 lb 1.1 oz)  Body mass index is  29.87 kg/m².  No intake or output data in the 24 hours ending 06/11/18 8971   Physical Exam   Constitutional: She is oriented to person, place, and time. She appears well-developed and well-nourished. No distress.   Cardiovascular: Normal rate, regular rhythm and normal heart sounds.  Exam reveals no gallop and no friction rub.    No murmur heard.  Pulmonary/Chest: Effort normal and breath sounds normal. No respiratory distress. She has no wheezes. She has no rales.   Abdominal: Soft. Bowel sounds are normal. She exhibits no distension. There is no tenderness.   Musculoskeletal: Normal range of motion. She exhibits no edema or tenderness.   Neurological: She is alert and oriented to person, place, and time.   Skin: Skin is warm and dry. Bruising noted. No rash noted. She is not diaphoretic. No cyanosis. Nails show no clubbing.   bruising to BUE, right > left chest, hematoma to below right costal margin   Psychiatric: She has a normal mood and affect. Her behavior is normal.       Significant Labs:    Recent Labs  Lab 06/08/18  0434 06/09/18  0419 06/11/18  0604   WBC 7.47 5.97 6.33   HGB 7.9* 8.5* 8.4*   HCT 24.8* 26.5* 26.0*    244 275       Recent Labs  Lab 06/07/18  0558 06/08/18  0434 06/09/18  0419 06/11/18  0604    139 140 145   K 4.9 5.1 4.5 4.1   * 109 106 103   CO2 20* 22* 25 31*   BUN 16 15 14 15   CREATININE 1.0 0.9 0.9 1.1   CALCIUM 7.6* 7.8* 7.7* 7.6*   PROT 6.1 5.8* 5.7*  --    BILITOT 0.4 0.4 0.4  --    ALKPHOS 105 93 95  --    ALT 13 12 11  --    AST 14 13 12  --      Lab Results   Component Value Date    LABPROT 11.2 06/04/2018    ALBUMIN 2.2 (L) 06/09/2018     Lab Results   Component Value Date    CALCIUM 7.6 (L) 06/11/2018    PHOS 3.9 06/11/2018     POCT Glucose   Date Value Ref Range Status   06/11/2018 132 (H) 70 - 110 mg/dL Final   06/11/2018 113 (H) 70 - 110 mg/dL Final   06/11/2018 121 (H) 70 - 110 mg/dL Final   06/10/2018 118 (H) 70 - 110 mg/dL Final   06/10/2018 167 (H)  70 - 110 mg/dL Final   06/10/2018 113 (H) 70 - 110 mg/dL Final   06/10/2018 124 (H) 70 - 110 mg/dL Final   06/09/2018 150 (H) 70 - 110 mg/dL Final   06/09/2018 137 (H) 70 - 110 mg/dL Final   06/09/2018 128 (H) 70 - 110 mg/dL Final   06/08/2018 116 (H) 70 - 110 mg/dL Final     Significant Imaging:   CXR 6/11/18  FINDINGS:  Heart size is unchanged there is a little patchy consolidation possible pleural fluid at the right lung base that is relatively similar to a recent exam.  No other significant findings    Assessment/Plan:      * Right MCA ischemic stroke, aborted by t-PA    Evaluated on 6/11/18  · Improved  · Continue therapy with ASA, atorvastatin  · continue PT/OT to increase ambulation, ADL performance and endurance  · continue heparin for DVT prophylaxis  · continue fall precautions  · continue senokot-s to prevent constipation; hold for frequent or loose stooling  · F/U with vascular neurology as scheduled        Anemia    Evaluated on 6/11/18  · Suspect some blood loss with hematoma and extensive bruising  · Continue to monitor with twice weekly labs        Chronic congestive heart failure    Evaluated on 6/11/18  · Documented in record; Echo with nl EF but indeterminate diastolic function  · Continue therapy with lasix as she takes daily  · Will weigh daily to monitor and adjust regimen as necessary  · Given extra dose of lasix with increase cough        Chronic respiratory failure with hypoxia    Evaluated on 6/11/18  · Continue supplemental oxygen as she uses at home.  · Monitor pulse ox.   · Minimize oxygen flow rate due to COPD.        Hematoma, chest wall    Evaluated on 6/11/18  · Tender to patient and remains in marked border  · Continue hydrocodone as needed for pain  · Will continue to monitor        Right rib fracture    Evaluated on 6/11/18  · documented by ICU team likely from previous facility at [facility]  · Continue baclofen and lidoderm patches to treat pain        Other specified  hypothyroidism    Evaluated on 6/11/18  · Hx of thyroid surgery  · Chronic and stable  · Continue therapy with levothyroxine  · F/U with PCP for ongoing monitoring and adjustment of levothyroxine dose as indicated        COPD (chronic obstructive pulmonary disease)    Evaluated on 6/11/18  · Increased cough with stable dyspnea on exertion  · Continue albuterol nebs TID as she takes at home  · Consider change to xopenex if tachycardia occurs  · Start acapella and guaifenesin to treat cough  · Resume spiriva therapy  · Patient afebrile with normal WBC; proceed to repeat CXR if cough does not improve, (cxr stable; given extra lasix today)        Dyslipidemia    Evaluated on 6/11/18  · Continue therapy with low fat diet and atorvastatin        Depression    Evaluated on 6/11/18  · Patient interactive and cooperative currently  · Continue chronic therapy with amitriptyline, buspirone  · Wellbutrin, zoloft and quetiapine also on chronic list in EPIC which will need to be confirmed with patient's pharmacy before resuming.   · Continue ramelteon nightly for insomnia        Essential hypertension    Evaluated on 6/11/18  · Improved control with metoprolol which was started for tachycardia  · Repeat EKG if HR consistently elevated  · will continue to monitor and adjust regimen as necessary        Type 2 diabetes mellitus, without long-term current use of insulin    Evaluated on 6/11/18  · Metformin and amaryl chronically at home; controlled  · Glucoses stable when admitted on a regular diet.  · Continue therapy with glucose monitoring AC and HS.  Change to diabetic diet if glucoses elevated.  · Hyperglycemia to be treated with SSNI prn.  · Glucose goal is < 180mg/dl and avoidance of hypoglycemia.  · Will continue to monitor and adjust regimen as necessary to achieve goals.            Keesha Gasca NP  Department of Hospital Medicine  [facility] [facility] - [facility] Nursing Care

## 2018-06-11 NOTE — ASSESSMENT & PLAN NOTE
Evaluated on 6/11/18  · Tender to patient and remains in marked border  · Continue hydrocodone as needed for pain  · Will continue to monitor

## 2018-06-11 NOTE — ASSESSMENT & PLAN NOTE
Evaluated on 6/11/18  · Hx of thyroid surgery  · Chronic and stable  · Continue therapy with levothyroxine  · F/U with PCP for ongoing monitoring and adjustment of levothyroxine dose as indicated

## 2018-06-11 NOTE — ASSESSMENT & PLAN NOTE
Swallowing difficulty Related to (etiology):   CVA     Signs and Symptoms (as evidenced by):   Dysphagia with swallowing precautions  Interventions/Recommendations (treatment strategy):  Level 5 Mech soft diet, pt refuses oral diabetic supplements  RD to follow  Nutrition Diagnosis Status:   New

## 2018-06-11 NOTE — ASSESSMENT & PLAN NOTE
Evaluated on 6/11/18  · Metformin and amaryl chronically at home; controlled  · Glucoses stable when admitted on a regular diet.  · Continue therapy with glucose monitoring AC and HS.  Change to diabetic diet if glucoses elevated.  · Hyperglycemia to be treated with SSNI prn.  · Glucose goal is < 180mg/dl and avoidance of hypoglycemia.  · Will continue to monitor and adjust regimen as necessary to achieve goals.

## 2018-06-11 NOTE — PROGRESS NOTES
06/11/2018  11:31 AM  Discharge Planning Assessment     Payor: MEDICARE / Plan: MEDICARE PART A & B / Product Type: Government /     Expected length of stay:  [x] 7 days   [] 10 days  [] 14 days   [] 21 days   [] > 30 days    Communicated expected length of stay with patient/caregiver:  [x] Yes   [] No    Anticipated discharge date:  6/15/2018    Assessment information obtained from:  [x] Patient   [] Caregiver     Arrived from:   [x] Home   [] Assisted Living    [] Nursing Home   [] SNF   [] Rehab  [] LTACH   [] Group Home   [] Foster Care   [] Psych   [] Shelter   [] Homeless   [] Transfer  [] Correctional Facility  [] Name of Facility:      Patient currently lives with:    [] Alone   [] Spouse   [] Daughter   [x] Son   [] Grandparents   []  Parents   [] Siblings   [] Friends   [] Domestic Partner   [] Facility Resident      [] Foster Home    [] Other:       Extended Emergency Contact Information  Primary Emergency Contact: Billy Cardoza   [place] [facility]  Mobile Phone: [phone]  Relation: Son     Prior to hospitalization cognitive status:   [x] Alert/Oriented  [] No Deficits [] Risk of Harm to Self/Others   [] Not Oriented to Person   [] Not Oriented to Place   [] Not Oriented to Time   [] Coma/Sedated/Intubated  [] Judgement Impaired    []  Unable to Assess   [] Inappropriate Behavior  [] Infant/Toddler    Prior to hospitalization functional status:   [x] Independent   [x] Assistive Equipment   [] Assistive Person    [] Completely Dependent  [] Infant/Toddler/Child Appropriate   [] Infant/Toddler/Child Delayed    []  Adolescent     Current cognitive status:   [x] Alert/Oriented  [] No Deficits [] Risk of Harm to Self/Others   [] Not Oriented to Person   [] Not Oriented to Place   [] Not Oriented to Time   [] Coma/Sedated/Intubated  [] Judgement Impaired    []  Unable to Assess   [] Inappropriate Behavior  [] Infant/Toddler    Current functional status:     [] Independent   [x] Assistive Equipment   [x]  Assistive Person     [] Completely Dependent   [] Infant/Toddler/Child Appropriate   [] Infant/Toddler/Child Delayed     [] Adolescent     Capacity to Care for Self:   Is patient able to return to prior living arrangements after discharge: [x] Yes  [] No     Is patient able to care for self after discharge?   [] Yes   [x] No     [] Pediatric     Does the patient have family/friends to assist after discharge?:  [x] Yes   [] No    [] N/A   Comments:  3 sons      Patient/caregiver perception of discharge disposition:   [] Home   [x] Home with Family  [x] Home Health   [] SNF   [] Rehab   [] LTAC    []  New Nursing Home Placement  [] Return to Nursing Home    [] Shelter     [] Assisted Living  [] Foster Home   [] Other:      Readmit:   Has patient mei in the hospital in the last 30 days? [] Yes   [x] No     If YES, was patient admitted for the same reason?  [] Yes   [] No       Home Health:   Patient currently receives home health services?:   [] Yes   [x] No     Patient previously received home health services and would like to resume services if necessary   [] Yes   [x] No     DME:   Patient currently uses DME:   [x] Yes   [] No        List of equipment currently used:     [] Wheelchair   [] Standard Walker  [x] Rolling Walker  [x]  Rollator    [x] Oxygen    [] Portable oxygen   [] Nebulizer    [] Apnea Monitor    [] Crutches  [] Hospital Bed   []  Lift Device   [] Scooter [] Cane     [] Prosthesis   []  BSC   [] Tub Bench   [] Catheter Supplies    [] Ostomy Supplies   [] Trach Supplies     [] Suction Machine        [] Home Vent    [] Bipap   [x] Other: shower chair        Medications:    Can the patient afford all prescribed medications?  [x] Yes   [] No     If NO, what medication:       Is the patient taking medications as prescribed?    [x] Yes   [] No    Financial Concerns:   Does the patient have any financial concerns?   [] Yes   [x] No      If YES, what are the concerns:      Transportation:   Does the patient  have transportation to healthcare appointments?   [x] Yes   [] No     If YES, what means of transportation does the patient have?   [] Car   [x] Family/Friend  [] Bicycle   [] Motorcycle   [] Public Transportation [] Ambulance[] Wheelchair van   [] Name of Provider    Dialysis:   Does the patient currently receive dialysis?   [] Yes   [x] No     Shad Burkett MD   [address]  [place] [address]  [phone]         APS/CPS involved in the case:  [] Yes   [x] No   If YES, name of :     If YES, phone number of :        Discharge Plan A:  [] Home   [x] Home with Family  [x] Home Health   [] SNF   [] Rehab   [] LTAC   []  New Nursing Home Placement  [] Return to Nursing Home    [] Assisted Living    [] Shelter     [] Private Duty Nursing   [] Foster Home    [] Psych    [] Early Steps  [] WIC       [] Home Hospice   [] Inpatient Hospice   [] Other    Discharge Plan B:  [] Home   [] Home with Family  [] Home Health   [] SNF   [] Rehab   [] LTAC  []  New Nursing Home Placement   [] Return to  Nursing Home    [] Assisted Living   [] Shelter  [] Private Duty Nursing   [] Foster Home     [] Psych     [] Early Steps  [] WIC    [] Home Hospice     [] Inpatient Hospice   [] Other     [x] Patient and family in agreement with discharge plan.    Met with patient and son in room to discuss discharge plan and date. Patient AAO, on O2 per NC. Patient stated has home O2.. Discharge plan is to return home with assist of 3 sons. Informed patient and son therapy recommends an 8 day SNF stay and that we (MD, nurse, therapy,Juan F,FRANCIS) meet today to review her case and come up with a discharge date. Patient stated understanding. Patient stated no home health preference.FRANCIS and SW will continue to follow for any additional needs.  Christine Massey RN, CM Skilled  [phone]

## 2018-06-11 NOTE — SUBJECTIVE & OBJECTIVE
Hospital Course:  The patient was admitted at [facility] from 6/4 to 6/9/2018.  6/9: Patient admitted to SNF for ongoing PT/Ot following a hospitalization R MCA stroke requiring tPA.  6/11: Patient seen at bedside doing well, reports soreness to right flank area and increase cough.    Interval History: Patient seen at bedside, on supplemental O2, son at bedside, answered all questions.     Review of Systems   Constitutional: Negative for appetite change, chills, fatigue and fever.   HENT: Negative for trouble swallowing.    Respiratory: Positive for cough. Negative for chest tightness, shortness of breath and wheezing.    Cardiovascular: Negative for chest pain, palpitations and leg swelling.        + right flank pain   Gastrointestinal: Negative for abdominal pain, constipation, diarrhea and nausea.   Genitourinary: Negative for difficulty urinating, frequency and urgency.   Musculoskeletal: Negative for arthralgias and myalgias.   Skin: Negative for rash.   Neurological: Negative for dizziness, weakness, light-headedness and headaches.   Psychiatric/Behavioral: Negative for sleep disturbance.     Scheduled Meds:   albuterol-ipratropium  3 mL Nebulization Q8H    amitriptyline  50 mg Oral QHS    aspirin  81 mg Oral Daily    atorvastatin  20 mg Oral Daily    baclofen  5 mg Oral QID    buPROPion  150 mg Oral Daily    busPIRone  5 mg Oral BID    dextromethorphan-guaifenesin  mg  1 tablet Oral BID    escitalopram oxalate  10 mg Oral Daily    fluticasone-vilanterol  1 puff Inhalation Daily    furosemide  20 mg Oral Daily    furosemide  20 mg Oral Once    heparin (porcine)  5,000 Units Subcutaneous Q8H    levothyroxine  175 mcg Oral Daily    lidocaine  1 patch Transdermal Q24H    metoprolol tartrate  25 mg Oral Q8H    QUEtiapine  100 mg Oral QHS    ramelteon  8 mg Oral QHS    senna-docusate 8.6-50 mg  1 tablet Oral BID    sodium chloride 0.9%  3 mL Intravenous Q8H    tiotropium  1 capsule  Inhalation Daily     Continuous Infusions:  PRN Meds:.acetaminophen, calcium carbonate, dextrose 50%, dextrose 50%, glucagon (human recombinant), glucose, HYDROcodone-acetaminophen, hydrOXYzine, insulin aspart U-100, ondansetron, ramelteon    Objective:     Vital Signs (Most Recent):  Temp: 97.7 °F (36.5 °C) (06/11/18 0753)  Pulse: 80 (06/11/18 1619)  Resp: 18 (06/11/18 1619)  BP: 125/60 (06/11/18 1413)  SpO2: 97 % (06/11/18 1619) Vital Signs (24h Range):  Temp:  [97.7 °F (36.5 °C)-99.1 °F (37.3 °C)] 97.7 °F (36.5 °C)  Pulse:  [] 80  Resp:  [18-20] 18  SpO2:  [88 %-97 %] 97 %  BP: (125-150)/(60-70) 125/60     Weight: 71.7 kg (158 lb 1.1 oz)  Body mass index is 29.87 kg/m².  No intake or output data in the 24 hours ending 06/11/18 1713   Physical Exam   Constitutional: She is oriented to person, place, and time. She appears well-developed and well-nourished. No distress.   Cardiovascular: Normal rate, regular rhythm and normal heart sounds.  Exam reveals no gallop and no friction rub.    No murmur heard.  Pulmonary/Chest: Effort normal and breath sounds normal. No respiratory distress. She has no wheezes. She has no rales.   Abdominal: Soft. Bowel sounds are normal. She exhibits no distension. There is no tenderness.   Musculoskeletal: Normal range of motion. She exhibits no edema or tenderness.   Neurological: She is alert and oriented to person, place, and time.   Skin: Skin is warm and dry. Bruising noted. No rash noted. She is not diaphoretic. No cyanosis. Nails show no clubbing.   bruising to BUE, right > left chest, hematoma to below right costal margin   Psychiatric: She has a normal mood and affect. Her behavior is normal.       Significant Labs:    Recent Labs  Lab 06/08/18  0434 06/09/18  0419 06/11/18  0604   WBC 7.47 5.97 6.33   HGB 7.9* 8.5* 8.4*   HCT 24.8* 26.5* 26.0*    244 275       Recent Labs  Lab 06/07/18  0558 06/08/18  0434 06/09/18  0419 06/11/18  0604    139 140 145   K 4.9  5.1 4.5 4.1   * 109 106 103   CO2 20* 22* 25 31*   BUN 16 15 14 15   CREATININE 1.0 0.9 0.9 1.1   CALCIUM 7.6* 7.8* 7.7* 7.6*   PROT 6.1 5.8* 5.7*  --    BILITOT 0.4 0.4 0.4  --    ALKPHOS 105 93 95  --    ALT 13 12 11  --    AST 14 13 12  --      Lab Results   Component Value Date    LABPROT 11.2 06/04/2018    ALBUMIN 2.2 (L) 06/09/2018     Lab Results   Component Value Date    CALCIUM 7.6 (L) 06/11/2018    PHOS 3.9 06/11/2018     POCT Glucose   Date Value Ref Range Status   06/11/2018 132 (H) 70 - 110 mg/dL Final   06/11/2018 113 (H) 70 - 110 mg/dL Final   06/11/2018 121 (H) 70 - 110 mg/dL Final   06/10/2018 118 (H) 70 - 110 mg/dL Final   06/10/2018 167 (H) 70 - 110 mg/dL Final   06/10/2018 113 (H) 70 - 110 mg/dL Final   06/10/2018 124 (H) 70 - 110 mg/dL Final   06/09/2018 150 (H) 70 - 110 mg/dL Final   06/09/2018 137 (H) 70 - 110 mg/dL Final   06/09/2018 128 (H) 70 - 110 mg/dL Final   06/08/2018 116 (H) 70 - 110 mg/dL Final     Significant Imaging:   CXR 6/11/18  FINDINGS:  Heart size is unchanged there is a little patchy consolidation possible pleural fluid at the right lung base that is relatively similar to a recent exam.  No other significant findings

## 2018-06-11 NOTE — PROGRESS NOTES
06/11/2018  3:19 PM    Discharge Planning-Met with patient and sons n room to inform of discharge date of 6/15/2018. Discharge date written on dry erase board in room. Patient and sons stated understanding to discharge date.  Christine Massey RN, CM Skilled  [phone]

## 2018-06-11 NOTE — CLINICAL REVIEW
Clinical Pharmacy Chart Review Note      Admit Date: 6/9/2018   LOS: 2 days       Olimpia Cardoza is a 67 y.o. female admitted to SNF for PT/OT after hospitalization for right MCA ischemic stroke aborted by t-PA.    Active Hospital Problems    Diagnosis  POA    *Right MCA ischemic stroke, aborted by t-PA [I63.411]  Yes    Right rib fracture [S22.31XA]  Yes    Hematoma, chest wall [S20.219A]  Yes    Chronic respiratory failure with hypoxia [J96.11]  Yes    Chronic congestive heart failure [I50.9]  Yes    Anemia [D64.9]  Yes    COPD (chronic obstructive pulmonary disease) [J44.9]  Yes    Other specified hypothyroidism [E03.8]  Yes    Dyslipidemia [E78.5]  Yes    Essential hypertension [I10]  Yes    Type 2 diabetes mellitus, without long-term current use of insulin [E11.9]  Yes     17 yrs      Depression [F32.9]  Yes      Resolved Hospital Problems    Diagnosis Date Resolved POA   No resolved problems to display.     Review of patient's allergies indicates:   Allergen Reactions    Codeine Palpitations     Patient Active Problem List    Diagnosis Date Noted    Right rib fracture 06/10/2018    Hematoma, chest wall 06/10/2018    Chronic respiratory failure with hypoxia 06/10/2018    Chronic congestive heart failure 06/10/2018    Anemia 06/10/2018    CVA (cerebral vascular accident) 06/09/2018    COPD (chronic obstructive pulmonary disease) 06/08/2018    Other specified hypothyroidism 06/05/2018    Neck pain 06/05/2018    Right MCA ischemic stroke, aborted by t-PA 06/04/2018    Left-sided weakness 06/04/2018    Small bowel obstruction 07/14/2016    Fatty liver 07/12/2015    Mitral regurgitation 03/10/2015    Obesity due to excess calories 03/10/2015    Dyslipidemia 03/10/2015    Hepatomegaly 02/24/2015    Type 2 diabetes mellitus, without long-term current use of insulin 02/24/2015    Essential hypertension 02/24/2015    Panlobular emphysema 02/24/2015    Sleep apnea 02/24/2015     Depression 02/24/2015       Scheduled Meds:    albuterol-ipratropium  3 mL Nebulization Q8H    amitriptyline  50 mg Oral QHS    aspirin  81 mg Oral Daily    atorvastatin  20 mg Oral Daily    baclofen  5 mg Oral QID    buPROPion  150 mg Oral Daily    busPIRone  5 mg Oral BID    escitalopram oxalate  10 mg Oral Daily    fluticasone-vilanterol  1 puff Inhalation Daily    furosemide  20 mg Oral Daily    guaiFENesin  600 mg Oral BID    heparin (porcine)  5,000 Units Subcutaneous Q8H    levothyroxine  175 mcg Oral Daily    lidocaine  1 patch Transdermal Q24H    magnesium sulfate IVPB  2 g Intravenous Once    metoprolol tartrate  25 mg Oral Q8H    QUEtiapine  100 mg Oral QHS    ramelteon  8 mg Oral QHS    senna-docusate 8.6-50 mg  1 tablet Oral BID    sodium chloride 0.9%  3 mL Intravenous Q8H    tiotropium  1 capsule Inhalation Daily     Continuous Infusions:   PRN Meds: acetaminophen, calcium carbonate, dextrose 50%, dextrose 50%, glucagon (human recombinant), glucose, HYDROcodone-acetaminophen, hydrOXYzine, insulin aspart U-100, ondansetron, ramelteon    OBJECTIVE:     Vital Signs (Last 24H)  Temp:  [97.7 °F (36.5 °C)-99.1 °F (37.3 °C)]   Pulse:  []   Resp:  [18-20]   BP: (125-150)/(60-70)   SpO2:  [88 %-96 %]     Laboratory:  CBC:   Recent Labs  Lab 06/08/18 0434 06/09/18 0419 06/11/18  0604   WBC 7.47 5.97 6.33   RBC 2.83* 3.03* 2.96*   HGB 7.9* 8.5* 8.4*   HCT 24.8* 26.5* 26.0*    244 275   MCV 88 88 88   MCH 27.9 28.1 28.4   MCHC 31.9* 32.1 32.3     BMP:   Recent Labs  Lab 06/08/18 0434 06/09/18 0419 06/11/18  0604   * 124* 119*    140 145   K 5.1 4.5 4.1    106 103   CO2 22* 25 31*   BUN 15 14 15   CREATININE 0.9 0.9 1.1   CALCIUM 7.8* 7.7* 7.6*   MG 1.7 1.0* 0.9*     CMP:   Recent Labs  Lab 06/07/18  0558 06/08/18  0434 06/09/18  0419 06/11/18  0604   * 117* 124* 119*   CALCIUM 7.6* 7.8* 7.7* 7.6*   ALBUMIN 2.4* 2.3* 2.2*  --    PROT 6.1 5.8* 5.7*  --      139 140 145   K 4.9 5.1 4.5 4.1   CO2 20* 22* 25 31*   * 109 106 103   BUN 16 15 14 15   CREATININE 1.0 0.9 0.9 1.1   ALKPHOS 105 93 95  --    ALT 13 12 11  --    AST 14 13 12  --    BILITOT 0.4 0.4 0.4  --      LFTs:   Recent Labs  Lab 06/07/18  0558 06/08/18  0434 06/09/18  0419   ALT 13 12 11   AST 14 13 12   ALKPHOS 105 93 95   BILITOT 0.4 0.4 0.4   PROT 6.1 5.8* 5.7*   ALBUMIN 2.4* 2.3* 2.2*     Coagulation:   Recent Labs  Lab 06/04/18 2049   INR 1.1   APTT <21.0     Cardiac markers: No results for input(s): CKMB, TROPONINT, MYOGLOBIN in the last 168 hours.  ABGs: No results for input(s): PH, PCO2, PO2, HCO3, POCSATURATED, BE in the last 168 hours.  Microbiology Results (last 7 days)     ** No results found for the last 168 hours. **        Specimen (12h ago through future)    None          Recent Labs  Lab 06/04/18 2103   COLORU Yellow   SPECGRAV 1.010   PHUR 5.0   PROTEINUA 1+*   BACTERIA Rare   NITRITE Negative   LEUKOCYTESUR Negative   UROBILINOGEN Negative   HYALINECASTS 0     Others:   Recent Labs  Lab 06/04/18 2049   TSH <0.010*         ASSESSMENT/PLAN:     Active Hospital Problems    Diagnosis    *Right MCA ischemic stroke, aborted by t-PA   --ASA 81 mg daily  --Atorvastatin 20 mg daily  --PT/OT  --bowel regimen for constipation; hold for loose or frequent stools: Senna-docusate twice daily  --DVT prophylaxis: Heparin 5000u q8hrs      Right rib fracture   --Baclofen 5 mg four times daily  --Lidoderm 5% patch daily      Hematoma, chest wall   --Hydrocodone/APAP 5-325 mg q6hrs prn moderate, severe pain      Chronic respiratory failure with hypoxia   --Fluticasone-vilanterol 200-25 mcg/dose 1 puff daily  --Tiotropium 18 mcg daily  --Albuterol-ipratropium 2.5-0.5 mg/3ml per neb q 8hrs  --Dextromethorphan-guaifenesin  mg twice daily      Chronic congestive heart failure   **Monitor weight  --Furosemide 20 mg daily (monitor BMP)  Wt Readings from Last 1 Encounters:   06/11/18  1252 71.7 kg (158 lb 1.1 oz)   06/11/18 0632 71.7 kg (158 lb 1.1 oz)   06/10/18 0633 74.7 kg (164 lb 10.9 oz)   06/09/18 1540 74 kg (163 lb 2.3 oz)     BMP  Lab Results   Component Value Date     06/11/2018    K 4.1 06/11/2018     06/11/2018    CO2 31 (H) 06/11/2018    BUN 15 06/11/2018    CREATININE 1.1 06/11/2018    CALCIUM 7.6 (L) 06/11/2018    ANIONGAP 11 06/11/2018    ESTGFRAFRICA 59.6 (A) 06/11/2018    EGFRNONAA 51.7 (A) 06/11/2018         Anemia   **Monitor CBC  Lab Results   Component Value Date    WBC 6.33 06/11/2018    HGB 8.4 (L) 06/11/2018    HCT 26.0 (L) 06/11/2018    MCV 88 06/11/2018     06/11/2018         COPD (chronic obstructive pulmonary disease)   --Continue Breo, Spiriva, Duonebs as ordered above      Other specified hypothyroidism   --Levothyroxine 175 mcg daily    Lab Results   Component Value Date    TSH <0.010 (L) 06/04/2018         Dyslipidemia   --Atorvastatin 20 mg daily  Lab Results   Component Value Date    CHOL 109 (L) 06/04/2018    CHOL 113 (L) 02/08/2017     Lab Results   Component Value Date    HDL 37 (L) 06/04/2018    HDL 39 (L) 02/08/2017     Lab Results   Component Value Date    LDLCALC 51.8 (L) 06/04/2018    LDLCALC 38.6 (L) 02/08/2017     Lab Results   Component Value Date    TRIG 101 06/04/2018    TRIG 177 (H) 02/08/2017     Lab Results   Component Value Date    CHOLHDL 33.9 06/04/2018    CHOLHDL 34.5 02/08/2017     Lab Results   Component Value Date    ALT 11 06/09/2018    AST 12 06/09/2018    ALKPHOS 95 06/09/2018    BILITOT 0.4 06/09/2018         Essential hypertension   **Monitor BP and pulse  --Metoprolol 25 mg q8hrs   BP Readings from Last 3 Encounters:   06/12/18 101/73   06/09/18 129/68   07/18/16 138/67     Pulse Readings from Last 3 Encounters:   06/12/18 98   06/09/18 88   07/18/16 86         Type 2 diabetes mellitus, without long-term current use of insulin  **Monitor blood glucose  --[facility]    Lab Results   Component Value Date    HGBA1C 6.1  (H) 06/04/2018     POCT Glucose   Date Value Ref Range Status   06/12/2018 178 (H) 70 - 110 mg/dL Final   06/12/2018 120 (H) 70 - 110 mg/dL Final   06/11/2018 111 (H) 70 - 110 mg/dL Final   06/11/2018 132 (H) 70 - 110 mg/dL Final   06/11/2018 113 (H) 70 - 110 mg/dL Final   06/11/2018 121 (H) 70 - 110 mg/dL Final   06/10/2018 118 (H) 70 - 110 mg/dL Final   06/10/2018 167 (H) 70 - 110 mg/dL Final   06/10/2018 113 (H) 70 - 110 mg/dL Final   06/10/2018 124 (H) 70 - 110 mg/dL Final   06/09/2018 150 (H) 70 - 110 mg/dL Final   06/09/2018 137 (H) 70 - 110 mg/dL Final         Depression  --Amitriptyline 50 mg nightly   --Bupropion 24hr 300 mg daily  --Buspirone 5 mg twice daily  --Escitalopram 10 mg daily  --Quetiapine 100 mg nightly (home med-need updated diagnosis documented)  --Hydroxyzine 10 mg 4 times daily prn anxiety  Monitor: mental status for depression, suicide ideation, anxiety, serotonin syndrome, hyponatremia, movement disorders, drowsiness, dizziness, somnolence       A. Fib  --Continue Metoprolol, monitor HR               I have reviewed the medications in compliance with CMS Regulation F329 of the [place] Appendix PP.      Carolann Solorzano, Pharm. D.  Clinical Pharmacist  [facility]

## 2018-06-11 NOTE — PLAN OF CARE
Problem: Patient Care Overview  Goal: Plan of Care Review  Outcome: Ongoing (interventions implemented as appropriate)  * Right MCA ischemic stroke, aborted by t-PA     Swallowing difficulty Related to (etiology):   CVA     Signs and Symptoms (as evidenced by):   Dysphagia with swallowing precautions  Interventions/Recommendations (treatment strategy):  Level 5 Mech soft diet, pt refuses oral diabetic supplements  RD to follow   Add 1500 diabetic to order  Recommendation/Intervention: Continue cardiac diet with mech soft level 5, pt declined boost glucose previously , RD to follow  Goals: PO will meet 85% of assessed needs  Nutrition Goal Status: new

## 2018-06-11 NOTE — ASSESSMENT & PLAN NOTE
Evaluated on 6/11/18  · Improved  · Continue therapy with ASA, atorvastatin  · continue PT/OT to increase ambulation, ADL performance and endurance  · continue heparin for DVT prophylaxis  · continue fall precautions  · continue senokot-s to prevent constipation; hold for frequent or loose stooling  · F/U with vascular neurology as scheduled

## 2018-06-11 NOTE — PT/OT/SLP PROGRESS
"Physical Therapy  Treatment    Olimpia Cardoza   MRN: [id number]   Admitting Diagnosis: Embolic stroke involving right middle cerebral artery    PT Received On: 06/11/18  Total Time (min): 58        Billable Minutes:58    Gait Training 10, Therapeutic Activity 18 and Therapeutic Exercise 30    Treatment Type: Treatment  PT/PTA: PTA     PTA Visit Number: 1       General Precautions: Standard, aspiration, fall, respiratory  Orthopedic Precautions: N/A   Braces: N/A    Do you have any cultural, spiritual, Rastafari conflicts, given your current situation?: none    Subjective:  "Kate been off oxygen about 5 minutes, went to bathroom" 02 sat at rest 90 % on RA, placed back on O2 per nsg 2L to keep 92%>    Pain/Comfort  Pain Rating 1: 10/10 (4 at rest, 10 with mvmt)  Location - Side 1: Right  Location - Orientation 1: posterior  Location 1: rib(s)  Pain Addressed 1: Pre-medicate for activity, Reposition, Distraction, Cessation of Activity, Nurse notified  Pain Rating Post-Intervention 1:  (inc with mobility, dec with rest)    Objective:  Patient found with:  (in BSC, son present, 02 not donned) donned 02 at 2L       Functional Status:  MDS G  Transfer Functional Status: CGA-Min (A)  Walk in Corridor Functional Status: CGA-Min (A)  Locomotion on Unit Functional Status: CGA-Min (A)    MDS GG  Sit to Stand Performance: Supervision or touching assistance.  Chair/bed-to-chair transfer Performance: Supervision or touching assistance.  Does the resident walk?: Yes --> Continue to Walk 50 feet with two turns assessment  Walk 50 feet with two turns Performance: Supervision or touching assistance.  Does the resident use a wheelchair/scooter?: Yes --> Continue to Wheel 50 feet with two turns assessment  Wheel 50 feet with two turns Performance: Supervision or touching assistance.  Indicate the type of wheelchair/scooter used: Manual     AM-PAC 6 CLICK MOBILITY  Total Score:19    Transfers:  Sit<>Stand: with RW CG/SBAvcs for " handplacement  Stand Pivot Transfer: CG/SBA WC<>nustep and wc <>BSC no AD      Gait:  Amb with RW CG/close SBA wc/02 in tow ~120 ft 02 sat post gait 02 sats  93%    Wheelchair Mobility:  Patient propels w/c ~100 ft with BUE SBA most of the time straight, CG for guidance to stay clear of obstacles and wall on right side     Therex:  2x10 reps AP,GS,LAQ,hip flex,abd/add    Additional Treatment:  nustep x 10 min with 1 rest break    Patient left up in chair with all lines intact, call button in reach, son present and belongings in reach.    Assessment:  Olimpia Cardoza is a 67 y.o. female with a medical diagnosis of Embolic stroke involving right middle cerebral artery.  Pt tolerated well, pt is very pleasant, demo good effort, pt would continue to benefit from skilled PT services to improve overall functional mobility, strength and endurance.  .    Rehab identified problem list/impairments: impaired endurance, gait instability, impaired balance, pain, impaired functional mobilty    Rehab potential is good.    Activity tolerance: Fair    Discharge recommendations: home health PT     Barriers to discharge: None    Equipment recommendations: bedside commode     GOALS:    Physical Therapy Goals        Problem: Physical Therapy Goal    Goal Priority Disciplines Outcome Goal Variances Interventions   Physical Therapy Goal     PT/OT, PT Ongoing (interventions implemented as appropriate)     Description:  Goals to be met by: 8 days     Patient will increase functional independence with mobility by performin. Supine to sit (HOB>30 degrees due to rib pain) with supervision.  2. Sit to supine (HOB>30 degrees due to rib pain) with supervision.  3. Sit to stand transfer with Supervision  4. Bed to chair transfer with Supervision using Rolling Walker  5. Gait  x 300 feet with Supervision using Rolling Walker with reciprocal gait pattern.   6. Ascend/descend 4 stairs with bilateral Handrails Stand-by to denote sufficient  strength for stair negotiation in the community.  7. Ascend/Descend 4 inch curb step with Stand-by Assistance using Rolling Walker.  8. Stand for 5 minutes with Supervision using Rolling Walker and/or UE support on stable surface while performing an activity.  9. Lower extremity exercise program x 20 reps per handout, with independence.  10. Pt will improve gait speed, to denote a lower fall risk and improvement in balance, from 0.34 m/s to 0.47m/s with use of a rolling walker.                         PLAN:    Patient to be seen 5 x/week  to address the above listed problems via    Plan of Care expires: 07/10/18  Plan of Care reviewed with: patient, son (2 sons)    Ophelia Freire, PTA  06/11/2018

## 2018-06-11 NOTE — ASSESSMENT & PLAN NOTE
Evaluated on 6/11/18  · Improved control with metoprolol which was started for tachycardia  · Repeat EKG if HR consistently elevated  · will continue to monitor and adjust regimen as necessary

## 2018-06-11 NOTE — ASSESSMENT & PLAN NOTE
Evaluated on 6/11/18  · documented by ICU team likely from previous facility at [facility]  · Continue baclofen and lidoderm patches to treat pain

## 2018-06-11 NOTE — PT/OT/SLP DISCHARGE
Occupational Therapy Discharge Summary    Olimpia Cardoza  MRN: [id number]   Principal Problem: Embolic stroke involving right middle cerebral artery      Patient Discharged from acute Occupational Therapy on 6/9/18.  Please refer to prior OT note dated 6/8/18 for functional status.    Assessment:      Patient appropriate for care in another setting.    Objective:     GOALS:    Occupational Therapy Goals        Problem: Occupational Therapy Goal    Goal Priority Disciplines Outcome Interventions   Occupational Therapy Goal     OT, PT/OT Ongoing (interventions implemented as appropriate)    Description:  Goals to be met by: 6/15/2018    Patient will increase functional independence with ADLs by performing:    UE Dressing with Contact Guard Assistance.  LE Dressing with Minimal Assistance.  Grooming while standing at sink with Contact Guard Assistance. MEt 6/8  Revised Grooming while standing at sink with SBA.   Toileting from toilet with Contact Guard Assistance for hygiene and clothing management.   Sitting at edge of bed x 15 minutes with Contact Guard Assistance. Met 6/7  Revised: sitting EOB x15 with SBA for dynamic sitting balance.   Supine to sit with Minimal Assistance. ( CGA)  Revised: supine to sit with supervision.   Toilet transfer to toilet with Minimal Assistance.                        Reasons for Discontinuation of Therapy Services  Transfer to alternate level of care.      Plan:     Patient Discharged to: Skilled Nursing Facility    Shannon rabago OT  6/11/2018

## 2018-06-11 NOTE — CONSULTS
"  [facility] - Skilled Nursing Care  Adult Nutrition  Consult Note    SUMMARY     Recommendations  Add 1500 diabetic to order , discussed with NP Keesha who declined as glucose levels look controlled to date and we want to encourage po intake.  Recommendation/Intervention: [facility], pt declined boost glucose previously , RD to follow  Goals: PO will meet 85% of assessed needs  Nutrition Goal Status: new  Communication of RD Recs: discussed on rounds    Reason for Assessment    Reason for Assessment: consult  Diagnosis: stroke/CVA  Relevant Medical History: DM2, POP, COPD, depression, HTN, AFIB, AAA,CVA, anemia, CHF, HLD,  Interdisciplinary Rounds: attended  General Information Comments: Diet advanced to [facility] soft level 5  Nutrition Discharge Planning: PO to meet 85% of needs with texture per ST    Nutrition Risk Screen    Nutrition Risk Screen: no indicators present    Nutrition/Diet History    Patient Reported Diet/Restrictions/Preferences: general  Typical Food/Fluid Intake: PO %  Food Preferences: no red meat  Do you have any cultural, spiritual, Judaism conflicts, given your current situation?: none  Food Allergies: NKFA  Factors Affecting Nutritional Intake: impaired cognitive status/motor control, chewing difficulties/inability to chew food    Anthropometrics    Temp: 97.7 °F (36.5 °C)  Height Method: Measured  Height: 5' 1" (154.9 cm)  Height (inches): 61 in  Weight Method: Standard Scale  Weight: 71.7 kg (158 lb 1.1 oz)  Weight (lb): 158.07 lb  Ideal Body Weight (IBW), Female: 105 lb  % Ideal Body Weight, Female (lb): 150.54 lb  BMI (Calculated): 29.9  BMI Grade: 25 - 29.9 - overweight       Lab/Procedures/Meds    Pertinent Labs Reviewed: reviewed  Pertinent Labs Comments: glucose 119, Hg 8.4, Hct 26, Mg .9,   Pertinent Medications Reviewed: reviewed  Pertinent Medications Comments: statin, senna, heparin, ASA, lasix, Mg, levothyroxine    Physical " Findings/Assessment    Overall Physical Appearance: obese, on oxygen therapy  Tubes:  (-)  Oral/Mouth Cavity: tooth/teeth missing, dental caries  Skin: intact    Estimated/Assessed Needs    Weight Used For Calorie Calculations: 71.7 kg (158 lb 1.1 oz)  Energy Calorie Requirements (kcal): 1500 (x 1.25 (PAL))  Energy Need Method: [place] Jeor  Protein Requirements: 86g (x 1.2g/kg)  Weight Used For Protein Calculations: 72 kg (158 lb 11.7 oz)  Fluid Requirements (mL): or per MD  Fluid Need Method: RDA Method  RDA Method (mL): 1500  CHO Requirement: 50% of calories      Nutrition Prescription Ordered    Current Diet Order: Cardiac mech soft level 5 thin liquids  Nutrition Order Comments: add 1500 diabetic diet to order  Oral Nutrition Supplement: none    Evaluation of Received Nutrient/Fluid Intake    Energy Calories Required: meeting needs  Protein Required: meeting needs  Fluid Required: meeting needs  Tolerance: tolerating  % Intake of Estimated Energy Needs: 75 - 100 %  % Meal Intake: 75 - 100 %    Nutrition Risk    Level of Risk/Frequency of Follow-up: low     Assessment and Plan    * Right MCA ischemic stroke, aborted by t-PA    Swallowing difficulty Related to (etiology):   CVA     Signs and Symptoms (as evidenced by):   Dysphagia with swallowing precautions  Interventions/Recommendations (treatment strategy):  Level 5 Mech soft diet, pt refuses oral diabetic supplements  RD to follow  Nutrition Diagnosis Status:   New               Monitor and Evaluation    Food and Nutrient Intake: food and beverage intake  Food and Nutrient Adminstration: other (specify)  Physical Activity and Function: breastfeeding  Anthropometric Measurements: weight change  Biochemical Data, Medical Tests and Procedures: electrolyte and renal panel, glucose/endocrine profile, inflammatory profile, gastrointestinal profile     Nutrition Follow-Up    RD Follow-up?: Yes

## 2018-06-11 NOTE — PLAN OF CARE
Problem: Physical Therapy Goal  Goal: Physical Therapy Goal  Goals to be met by: 8 days     Patient will increase functional independence with mobility by performin. Supine to sit (HOB>30 degrees due to rib pain) with supervision.  2. Sit to supine (HOB>30 degrees due to rib pain) with supervision.  3. Sit to stand transfer with Supervision  4. Bed to chair transfer with Supervision using Rolling Walker  5. Gait  x 300 feet with Supervision using Rolling Walker with reciprocal gait pattern.   6. Ascend/descend 4 stairs with bilateral Handrails Stand-by to denote sufficient strength for stair negotiation in the community.  7. Ascend/Descend 4 inch curb step with Stand-by Assistance using Rolling Walker.  8. Stand for 5 minutes with Supervision using Rolling Walker and/or UE support on stable surface while performing an activity.  9. Lower extremity exercise program x 20 reps per handout, with independence.  10. Pt will improve gait speed, to denote a lower fall risk and improvement in balance, from 0.34 m/s to 0.47m/s with use of a rolling walker.        Outcome: Ongoing (interventions implemented as appropriate)  Goals remain appropriate

## 2018-06-11 NOTE — PLAN OF CARE
Problem: SLP Goal  Goal: SLP Goal  Goals to be met 6/18:   1) Pt will tolerate current diet of mechanical soft solids w thin liquids following aspiration precautions.   2) Pt will complete problem solving/reasoning tasks with 80% accy and min A.   3) Pt will name 12 items/1 minute with min A.   4) Pt will complete mental manipulation tasks with 80% accy and mod A.   5) Pt will undergo further assessment of reading, writing and visual spatial skills.   Outcome: Ongoing (interventions implemented as appropriate)    Pt seen for speech, language, cognition and swallowing assessment. Recommend continue mechanical soft solids w thin liquids at this time. ST to follow according to POC.     Tiana Krishnan M.S., CCC-SLP  Speech Language Pathologist  [phone] - pager   6/11/2018

## 2018-06-11 NOTE — ASSESSMENT & PLAN NOTE
Evaluated on 6/11/18  · Patient interactive and cooperative currently  · Continue chronic therapy with amitriptyline, buspirone  · Wellbutrin, zoloft and quetiapine also on chronic list in EPIC which will need to be confirmed with patient's pharmacy before resuming.   · Continue ramelteon nightly for insomnia

## 2018-06-11 NOTE — PT/OT/SLP EVAL
Speech Language Pathology  Evaluation    Olimpia Cardoza   MRN: [id number]   Admitting Diagnosis: Embolic stroke involving right middle cerebral artery    Diet recommendations: Solid Diet Level: Mechanical soft, No Rice, Chopped meat  Liquid Diet Level: Thin 1 bite/sip at a time, Alternating bites/sips, Check for pocketing/oral residue, Eliminate distractions, Meds whole 1 at a time, Small bites/sips and Standard aspiration precautions    SLP Treatment Date: 06/11/18  Speech Start Time: 1512     Speech Stop Time: 1545     Speech Total (min): 33 min       TREATMENT BILLABLE MINUTES:  Eval 20  and Eval Swallow and Oral Function 13    Diagnosis: Embolic stroke involving right middle cerebral artery      Past Medical History:   Diagnosis Date    A-fib 6/7/2018    Chronic congestive heart failure 6/10/2018    CVA (cerebral vascular accident) 6/4/2018    Depression 2/24/2015    Diabetes mellitus     Emphysema 2/24/2015    Hx smoking; home oxygen 2 l for past 4 years    Encounter for blood transfusion     Hepatomegaly 2/24/2015    Found on Routine imaging    HTN (hypertension) 2/24/2015    Hyperlipidemia     Other specified hypothyroidism 6/5/2018    Sleep apnea 2/24/2015     Past Surgical History:   Procedure Laterality Date    CARDIAC CATHETERIZATION      CHOLECYSTECTOMY      dislocated hip      traction    HEMORRHOID SURGERY      HERNIA REPAIR      with mesh    NISSEN FUNDOPLICATION      THYROID SURGERY         Has the patient been evaluated by SLP for swallowing? : Yes  Keep patient NPO?: No   General Precautions: Standard, aspiration, fall    Current Respiratory Status: nasal cannula     Social Hx: Patient lives with two sons. Pt's sons present for initial portion of assessment. Per pt and son's report, pt manages medications, however son's manage finances. Pt has not been driving for approx 20 years. Pt reported she was independent with all other ADLs.     Prior diet: regular w thin  "liquids.    Subjective:  "I never thought something like this would make me so confused."   Patient goals: to go home.         Objective:        Oral Musculature Evaluation  Oral Musculature: WFL  Dentition: upper dentures  Mucosal Quality: adequate  Oral Labial Strength and Mobility: WFL  Lingual Strength and Mobility: WFL  Velar Elevation: WFL  Buccal Strength and Mobility: WFL  Volitional Cough: adequate   Volitional Swallow: adequate  Voice Prior to PO Intake: clear, slightly hoarse     Cognitive Status:  Behavioral Observations: alert, appropriate and cooperative-  Memory and Orientation: Pt OX4 independently. Pt recalled 4/4 numbers, 5/5 numbers and 4/5 words independently. Pt recalled 1/3 words after a delay, increasing to 3/3 with semantic cues. Pt demonstrated difficulty with sequencing 4-components. Pt able to sequence 2/4 independently requiring step by step with clinician to complete sequence.   Attention: Adequate.   Problem Solving: Pt generated solutions to hypothetical situations in 2/3 trials independently increasing to 3/3 with MOD-MAX A. Pt not able to generate multiple solutions to a problem, requiring MAX clinician verbal cues. Pt compared and contrasted two items with MOD A and completed categorizational task in 1/3 trials. Pt benefited from increased processing time to complete all therapy tasks.   Pragmatics: WFL  Executive Function: mental flexibility, organization, planning and self-monitoring     Auditory Comprehension: Pt answered complex Y/N questions, followed 2-step directions and complex directions with 100% accy independently.     Verbal Expression: Pt completed word fluency task where pt generated 9 items/1 minute (where 15-20 is considered WFL). It is noted, pt repeated 1 item three times and two items twice. Pt able to participate in conversation with no overt expressive language deficits. Pt able to communicate wants/needs.     Motor Speech: WFL    Voice: WFL    Reading: " TBA    Writing: TBA    Visual-Spatial: TBA    Bedside Swallow Eval:  Consistencies Assessed: Thin liquids cup sip, straw  Solids 1/4 cracker  Oral Phase: Pt with slightly prolonged mastication time. No oral/lingual residue noted. Pt independently utilized compensatory strategies including liquid wash and lingual sweep.   Pharyngeal Phase: It is noted, pt with cough at baseline. No coughing/signs of airway compromise noted throughout all po trials.     Additional Treatment:  Pt educated on aspiration precautions, s/s of aspiration, results from evaluation, role of SLP and POC. Pt verbalized understanding.     FIM:                                  Assessment:  Olimpia Cardoza is a 67 y.o. female with a medical diagnosis of Embolic stroke involving right middle cerebral artery and presents with cognitive linguistic deficits. See above for additional details.          Discharge recommendations: Discharge Facility/Level Of Care Needs: home with home health     Diet recommendations: Solid Diet Level: Mechanical soft, No Rice, Chopped meat  Liquid Diet Level: Thin     Goals:    SLP Goals        Problem: SLP Goal    Goal Priority Disciplines Outcome   SLP Goal     SLP Ongoing (interventions implemented as appropriate)   Description:  Goals to be met 6/18:   1) Pt will tolerate current diet of mechanical soft solids w thin liquids following aspiration precautions.   2) Pt will complete problem solving/reasoning tasks with 80% accy and min A.   3) Pt will name 12 items/1 minute with min A.   4) Pt will complete mental manipulation tasks with 80% accy and mod A.   5) Pt will undergo further assessment of reading, writing and visual spatial skills.                      Plan:   Patient to be seen Therapy Frequency: 5 x/week   PlaPlan of Care expires: 07/10/18  Plan of Care reviewed with: patient  SLP Follow-up?: Yes           Tiana Krishnan M.S., CCC-SLP  Speech Language Pathologist  [phone] - pager   6/11/2018          Tiana Krishnan, MS CCC-SLP  06/11/2018

## 2018-06-11 NOTE — ASSESSMENT & PLAN NOTE
Evaluated on 6/11/18  · Increased cough with stable dyspnea on exertion  · Continue albuterol nebs TID as she takes at home  · Consider change to xopenex if tachycardia occurs  · Start acapella and guaifenesin to treat cough  · Resume spiriva therapy  · Patient afebrile with normal WBC; proceed to repeat CXR if cough does not improve, (cxr stable; given extra lasix today)

## 2018-06-11 NOTE — ASSESSMENT & PLAN NOTE
Evaluated on 6/11/18  · Documented in record; Echo with nl EF but indeterminate diastolic function  · Continue therapy with lasix as she takes daily  · Will weigh daily to monitor and adjust regimen as necessary  · Given extra dose of lasix with increase cough

## 2018-06-12 ENCOUNTER — TELEPHONE (OUTPATIENT)
Dept: NEUROLOGY | Facility: CLINIC | Age: 68
End: 2018-06-12

## 2018-06-12 LAB
POCT GLUCOSE: 102 MG/DL (ref 70–110)
POCT GLUCOSE: 120 MG/DL (ref 70–110)
POCT GLUCOSE: 177 MG/DL (ref 70–110)
POCT GLUCOSE: 178 MG/DL (ref 70–110)

## 2018-06-12 PROCEDURE — 94640 AIRWAY INHALATION TREATMENT: CPT

## 2018-06-12 PROCEDURE — 25000003 PHARM REV CODE 250: Performed by: INTERNAL MEDICINE

## 2018-06-12 PROCEDURE — 97110 THERAPEUTIC EXERCISES: CPT

## 2018-06-12 PROCEDURE — 63600175 PHARM REV CODE 636 W HCPCS: Performed by: PHYSICIAN ASSISTANT

## 2018-06-12 PROCEDURE — 99307 SBSQ NF CARE SF MDM 10: CPT | Mod: ,,, | Performed by: NURSE PRACTITIONER

## 2018-06-12 PROCEDURE — 25000242 PHARM REV CODE 250 ALT 637 W/ HCPCS: Performed by: INTERNAL MEDICINE

## 2018-06-12 PROCEDURE — 97535 SELF CARE MNGMENT TRAINING: CPT

## 2018-06-12 PROCEDURE — 25000003 PHARM REV CODE 250: Performed by: PHYSICIAN ASSISTANT

## 2018-06-12 PROCEDURE — 94664 DEMO&/EVAL PT USE INHALER: CPT

## 2018-06-12 PROCEDURE — 25000242 PHARM REV CODE 250 ALT 637 W/ HCPCS: Performed by: PHYSICIAN ASSISTANT

## 2018-06-12 PROCEDURE — 97530 THERAPEUTIC ACTIVITIES: CPT

## 2018-06-12 PROCEDURE — 11000004 HC SNF PRIVATE

## 2018-06-12 PROCEDURE — 27000221 HC OXYGEN, UP TO 24 HOURS

## 2018-06-12 PROCEDURE — 92507 TX SP LANG VOICE COMM INDIV: CPT

## 2018-06-12 PROCEDURE — 99900035 HC TECH TIME PER 15 MIN (STAT)

## 2018-06-12 PROCEDURE — 97116 GAIT TRAINING THERAPY: CPT

## 2018-06-12 PROCEDURE — 25000003 PHARM REV CODE 250: Performed by: NURSE PRACTITIONER

## 2018-06-12 RX ORDER — FLUTICASONE FUROATE AND VILANTEROL 100; 25 UG/1; UG/1
1 POWDER RESPIRATORY (INHALATION) DAILY
COMMUNITY

## 2018-06-12 RX ORDER — ESCITALOPRAM OXALATE 10 MG/1
10 TABLET ORAL 2 TIMES DAILY
Status: ON HOLD | COMMUNITY
End: 2018-06-14

## 2018-06-12 RX ORDER — LANOLIN ALCOHOL/MO/W.PET/CERES
400 CREAM (GRAM) TOPICAL 2 TIMES DAILY
Status: DISCONTINUED | OUTPATIENT
Start: 2018-06-12 | End: 2018-06-15 | Stop reason: HOSPADM

## 2018-06-12 RX ADMIN — METOPROLOL TARTRATE 25 MG: 25 TABLET ORAL at 06:06

## 2018-06-12 RX ADMIN — ATORVASTATIN CALCIUM 20 MG: 20 TABLET, FILM COATED ORAL at 10:06

## 2018-06-12 RX ADMIN — Medication 400 MG: at 10:06

## 2018-06-12 RX ADMIN — BUSPIRONE HYDROCHLORIDE 5 MG: 5 TABLET ORAL at 10:06

## 2018-06-12 RX ADMIN — LIDOCAINE 1 PATCH: 50 PATCH TOPICAL at 01:06

## 2018-06-12 RX ADMIN — GUAIFENESIN AND DEXTROMETHORPHAN HYDROBROMIDE 1 TABLET: 600; 30 TABLET, EXTENDED RELEASE ORAL at 10:06

## 2018-06-12 RX ADMIN — LEVOTHYROXINE SODIUM 175 MCG: 100 TABLET ORAL at 06:06

## 2018-06-12 RX ADMIN — FLUTICASONE FUROATE AND VILANTEROL TRIFENATATE 1 PUFF: 200; 25 POWDER RESPIRATORY (INHALATION) at 10:06

## 2018-06-12 RX ADMIN — IPRATROPIUM BROMIDE AND ALBUTEROL SULFATE 3 ML: .5; 3 SOLUTION RESPIRATORY (INHALATION) at 04:06

## 2018-06-12 RX ADMIN — STANDARDIZED SENNA CONCENTRATE AND DOCUSATE SODIUM 1 TABLET: 8.6; 5 TABLET, FILM COATED ORAL at 10:06

## 2018-06-12 RX ADMIN — TIOTROPIUM BROMIDE 18 MCG: 18 CAPSULE ORAL; RESPIRATORY (INHALATION) at 10:06

## 2018-06-12 RX ADMIN — BACLOFEN 5 MG: 10 TABLET ORAL at 10:06

## 2018-06-12 RX ADMIN — QUETIAPINE FUMARATE 100 MG: 25 TABLET ORAL at 10:06

## 2018-06-12 RX ADMIN — HEPARIN SODIUM 5000 UNITS: 5000 INJECTION, SOLUTION INTRAVENOUS; SUBCUTANEOUS at 06:06

## 2018-06-12 RX ADMIN — AMITRIPTYLINE HYDROCHLORIDE 50 MG: 50 TABLET, FILM COATED ORAL at 10:06

## 2018-06-12 RX ADMIN — HEPARIN SODIUM 5000 UNITS: 5000 INJECTION, SOLUTION INTRAVENOUS; SUBCUTANEOUS at 10:06

## 2018-06-12 RX ADMIN — METOPROLOL TARTRATE 25 MG: 25 TABLET ORAL at 10:06

## 2018-06-12 RX ADMIN — BUPROPION HYDROCHLORIDE 150 MG: 150 TABLET, EXTENDED RELEASE ORAL at 10:06

## 2018-06-12 RX ADMIN — BACLOFEN 5 MG: 10 TABLET ORAL at 12:06

## 2018-06-12 RX ADMIN — ASPIRIN 81 MG CHEWABLE TABLET 81 MG: 81 TABLET CHEWABLE at 10:06

## 2018-06-12 RX ADMIN — INSULIN ASPART 2 UNITS: 100 INJECTION, SOLUTION INTRAVENOUS; SUBCUTANEOUS at 12:06

## 2018-06-12 RX ADMIN — HYDROCODONE BITARTRATE AND ACETAMINOPHEN 1 TABLET: 5; 325 TABLET ORAL at 04:06

## 2018-06-12 RX ADMIN — HEPARIN SODIUM 5000 UNITS: 5000 INJECTION, SOLUTION INTRAVENOUS; SUBCUTANEOUS at 01:06

## 2018-06-12 RX ADMIN — IPRATROPIUM BROMIDE AND ALBUTEROL SULFATE 3 ML: .5; 3 SOLUTION RESPIRATORY (INHALATION) at 07:06

## 2018-06-12 RX ADMIN — FUROSEMIDE 20 MG: 20 TABLET ORAL at 10:06

## 2018-06-12 RX ADMIN — ESCITALOPRAM OXALATE 10 MG: 10 TABLET ORAL at 10:06

## 2018-06-12 RX ADMIN — RAMELTEON 8 MG: 8 TABLET, FILM COATED ORAL at 10:06

## 2018-06-12 RX ADMIN — BACLOFEN 5 MG: 10 TABLET ORAL at 05:06

## 2018-06-12 NOTE — ASSESSMENT & PLAN NOTE
Evaluated on 6/12/18  · HR stable at 80  · Improved control with metoprolol which was started for tachycardia  · Repeat EKG if HR consistently elevated  · will continue to monitor and adjust regimen as necessary

## 2018-06-12 NOTE — PLAN OF CARE
Problem: Fall Risk (Adult)  Goal: Absence of Falls  Patient will demonstrate the desired outcomes by discharge/transition of care.   Outcome: Ongoing (interventions implemented as appropriate)   06/11/18 2000   Fall Risk (Adult)   Absence of Falls making progress toward outcome

## 2018-06-12 NOTE — PHYSICIAN QUERY
PT Name: Olimpia Cardoza  MR #: [id number]     Physician Query Form - Documentation Clarification      CDS/: Karen Houston               Contact information: [phone]    This form is a permanent document in the medical record.     Query Date: June 12, 2018    By submitting this query, we are merely seeking further clarification of documentation. Please utilize your independent clinical judgment when addressing the question(s) below.    The Medical record reflects the following:    Supporting Clinical Findings Location in Medical Record   Echo      CONCLUSIONS     1 - Normal left ventricular systolic function (EF 65-70%).     2 - Indeterminate LV diastolic function.     3 - Normal right ventricular systolic function .     4 - Pulmonary hypertension. The estimated PA systolic pressure is 51 mmHg.     5 - Trivial tricuspid regurgitation.     6 - Moderate left atrial enlargement.     7 - Concentric remodeling.     8 - No wall motion abnormalities.       Embolic stroke involving right middle cerebral artery     67 year old female with left sided weakness, gaze preference, and speech diffculties found down at home, intubated and sedated prior to transfer for potential thrombectomy after tpa treatment.                    Progress note 6/5 V N (Cuong/Mugi)     CONCLUSIONS     1 - Normal left ventricular systolic function (EF 65-70%).     2 - Indeterminate LV diastolic function.     3 - Normal right ventricular systolic function .     4 - Pulmonary hypertension. The estimated PA systolic pressure is 51 mmHg.     5 - Trivial tricuspid regurgitation.     6 - Moderate left atrial enlargement.     7 - Concentric remodeling.     8 - No wall motion abnormalities.        Echo 6/5                                                                            Doctor, Please specify diagnosis or diagnoses associated with above clinical findings. Please specify the type of Pulmonary Hypertension:      Provider Use Only    [     ]  Primary pulmonary hypertension (Group 1)  [     ] Other Secondary pulmonary hypertension  (Group 5)  [     ] Secondary pulmonary arterial hypertension due to: _______________  [     ] Pulmonary hypertension due to Left  heart disease (Group 2)  [     ] Pulmonary hypertension due to Lung disease and hypoxia (Group 3)  [     ] Chronic thromboembolic pulmonary hypertension (Group 4)  [     ] Pulmonary hypertension, unspecified   [     ] Other: ___________________                                                                                                                             [ x ] Clinically undetermined

## 2018-06-12 NOTE — PLAN OF CARE
Problem: Physical Therapy Goal  Goal: Physical Therapy Goal  Goals to be met by: 8 days     Patient will increase functional independence with mobility by performin. Supine to sit (HOB>30 degrees due to rib pain) with supervision.  2. Sit to supine (HOB>30 degrees due to rib pain) with supervision.  3. Sit to stand transfer with Supervision  4. Bed to chair transfer with Supervision using Rolling Walker  5. Gait  x 300 feet with Supervision using Rolling Walker with reciprocal gait pattern.   6. Ascend/descend 4 stairs with bilateral Handrails Stand-by to denote sufficient strength for stair negotiation in the community.  7. Ascend/Descend 4 inch curb step with Stand-by Assistance using Rolling Walker.  8. Stand for 5 minutes with Supervision using Rolling Walker and/or UE support on stable surface while performing an activity.  9. Lower extremity exercise program x 20 reps per handout, with independence.  10. Pt will improve gait speed, to denote a lower fall risk and improvement in balance, from 0.34 m/s to 0.47m/s with use of a rolling walker.        Outcome: Ongoing (interventions implemented as appropriate)  Goals remain appropriate, discussed with PT steps pt refused/fearful

## 2018-06-12 NOTE — PT/OT/SLP PROGRESS
"Speech Language Pathology  Treatment    Olimpia Cardoza   MRN: [id number]   Admitting Diagnosis: Embolic stroke involving right middle cerebral artery    Diet recommendations: Solid Diet Level: Mechanical soft, Chopped meat, No Rice  Liquid Diet Level: Thin 1 bite/sip at a time, Alternating bites/sips, Avoid talking while eating, Eliminate distractions, Feed only when awake/alert, Frequent oral care, HOB to 90 degrees, Meds whole 1 at a time, Small bites/sips and Standard aspiration precautions    SLP Treatment Date: 06/12/18  Speech Start Time: 1513     Speech Stop Time: 1548     Speech Total (min): 35 min       TREATMENT BILLABLE MINUTES:  Speech Therapy Individual 25 and Seld Care/Home Management Training 10    Has the patient been evaluated by SLP for swallowing? : Yes  Keep patient NPO?: No   General Precautions: Standard, aspiration, fall  Current Respiratory Status: nasal cannula       Subjective:  "I just can't keep nothing in my mind."       Objective:   Patient found with: oxygen    Pt seen sitting up in bedside chair with son present part of session. Pt demonstrating pain in ribs intermittently, but did not rate on pain scale. Nurse notified.  Pt's reading abilities were assessed with pt wearing eyeglasses.  Reading abilities were judged to be functional, though one error was present. Pt's son reported pt completed 7th grade and likely had dyslexia, though never officially diagnosed.  Pt's son feels pt is at/near baseline for cognitive-linguistic abilities.  Pt wrote name and address with good legibility.  Clock drawing assessment completed with adequate spacing of numbers. Pt c/o changes in vision since stroke, stating "my eyes feel heavy." SLP to further assess visual spatial abilities.  Pt completed mental manipulation tasks ranking series of 3 words with 20% acc. Ind'ly/60% given cues.  Pt immediately recalled functional directions of increasing length with 56% acc. Ind'ly/61% given cues.  Education " provided to pt and son regarding cognition, immediate memory, effects of stroke, cognitive-linguistic therapy, and plan to decrease frequency to 3x/wk given possible baseline cognition.  Pt ans son expressed understanding and were in agreement with plan.    Assessment:  Olimpia Cardoza is a 67 y.o. female with a medical diagnosis of Embolic stroke involving right middle cerebral artery and presents with cognitive-linguistic impairments (may be close to baseline per son).    Discharge recommendations: Discharge Facility/Level Of Care Needs:  (tbd pending progress with ST)     Goals:    SLP Goals        Problem: SLP Goal    Goal Priority Disciplines Outcome   SLP Goal     SLP Ongoing (interventions implemented as appropriate)   Description:  Goals to be met 6/18:   1) Pt will tolerate current diet of mechanical soft solids w thin liquids following aspiration precautions.   2) Pt will complete problem solving/reasoning tasks with 80% accy and min A.   3) Pt will name 12 items/1 minute with min A.   4) Pt will complete mental manipulation tasks with 80% accy and mod A.   5) Pt will undergo further assessment of reading, writing and visual spatial skills.                      Plan:   Patient to be seen Therapy Frequency: 3 x/week   Plan of Care expires: 07/10/18  Plan of Care reviewed with: patient, son  SLP Follow-up?: Yes              PABLO Montiel, CCC-SLP  06/12/2018     PABLO Montiel, CCC-SLP  Speech Language Pathologist  [phone]  6/12/2018

## 2018-06-12 NOTE — PLAN OF CARE
06/12/2018  3:22 PM     06/12/18 1522   Medicare Message   Important Message from Medicare regarding Discharge Appeal Rights Given to patient/caregiver;Explained to patient/caregiver;Signed/date by patient/caregiver   JOCY served NOMNC to patient notifying of discharge date of 6/15/18 and appeal right.  Patient expressed understanding and satisfaction regarding discharge date.  Patient signed NOMNC, and SW provided patient with copy.  JOCY placed original in patient's blue chart in nurse's station.    Pt plans to return home upon SNF d/c where she lives with 2 sons.  Pt reports having 3 sons and having at least one son with her at home to assist at all times.  Pt also has a sister who can assist if needed as well.  Pt lives in a H with 1 threshold NAJMA.  Pt has a tub/shower and owns a SC, rollator, RW, WC, and O2.  Pt requesting BSC order as recommended by therapy for SNF d/c.  Pt agreeable to home health placement upon SNF d/c and denies having preference for HH agency.  Pt denies having any questions or concerns regarding d/c plan at this time and expresses readiness for return home on 6/15.  SW remains available for further d/c planning assistance and will f/u as needed.    Juan C Marin, KLEBER  [id number]

## 2018-06-12 NOTE — TREATMENT PLAN
[facility] DME recommendations    Olimpia Cardoza  MRN: [id number]      [x] 3 in 1 commode Standard    [x] Home health PT and OT aide        Ophelia Freire, PTA 6/12/2018

## 2018-06-12 NOTE — SUBJECTIVE & OBJECTIVE
Hospital Course:  The patient was admitted at [facility] from 6/4 to 6/9/2018.  6/9: Patient admitted to SNF for ongoing PT/Ot following a hospitalization R MCA stroke requiring tPA.  6/11: Patient seen at bedside doing well, reports soreness to right flank area and increase cough.  6/12: Patient seen at bedside, report improvement in in SOB and cough from additional dose of lasix.    Interval History: Patient seen at bedside, doing well, no acute events overnight. Son at bedside.    Review of Systems   Respiratory: Positive for cough. Negative for chest tightness, shortness of breath and wheezing.    Cardiovascular: Negative for chest pain, palpitations and leg swelling.   Musculoskeletal:        + right flank sorness     Scheduled Meds:   albuterol-ipratropium  3 mL Nebulization Q8H    amitriptyline  50 mg Oral QHS    aspirin  81 mg Oral Daily    atorvastatin  20 mg Oral Daily    baclofen  5 mg Oral QID    buPROPion  150 mg Oral Daily    busPIRone  5 mg Oral BID    dextromethorphan-guaifenesin  mg  1 tablet Oral BID    escitalopram oxalate  10 mg Oral Daily    fluticasone-vilanterol  1 puff Inhalation Daily    furosemide  20 mg Oral Daily    heparin (porcine)  5,000 Units Subcutaneous Q8H    levothyroxine  175 mcg Oral Daily    lidocaine  1 patch Transdermal Q24H    magnesium oxide  400 mg Oral BID    metoprolol tartrate  25 mg Oral Q8H    QUEtiapine  100 mg Oral QHS    ramelteon  8 mg Oral QHS    senna-docusate 8.6-50 mg  1 tablet Oral BID    sodium chloride 0.9%  3 mL Intravenous Q8H    tiotropium  1 capsule Inhalation Daily     Continuous Infusions:  PRN Meds:.acetaminophen, calcium carbonate, dextrose 50%, dextrose 50%, glucagon (human recombinant), glucose, HYDROcodone-acetaminophen, hydrOXYzine, insulin aspart U-100, ondansetron, ramelteon    Objective:     Vital Signs (Most Recent):  Temp: 98 °F (36.7 °C) (06/12/18 1005)  Pulse: 80 (06/12/18 1005)  Resp: 19 (06/12/18 1005)  BP:  110/76 (06/12/18 1005)  SpO2: 97 % (06/12/18 1005) Vital Signs (24h Range):  Temp:  [98 °F (36.7 °C)] 98 °F (36.7 °C)  Pulse:  [68-88] 80  Resp:  [18-20] 19  SpO2:  [93 %-97 %] 97 %  BP: (110-126)/(60-76) 110/76     Weight: 71.7 kg (158 lb 1.1 oz)  Body mass index is 29.87 kg/m².  No intake or output data in the 24 hours ending 06/12/18 1136   Physical Exam   Cardiovascular: Normal rate, regular rhythm and normal heart sounds.    No murmur heard.  Pulmonary/Chest: Effort normal and breath sounds normal. No respiratory distress. She has no wheezes. She has no rales.   Skin: Bruising noted.   bruising to BUE, right > left chest, hematoma to below right costal margin        Significant Labs:     Recent Labs  Lab 06/08/18  0434 06/09/18  0419 06/11/18  0604   WBC 7.47 5.97 6.33   HGB 7.9* 8.5* 8.4*   HCT 24.8* 26.5* 26.0*    244 275         Recent Labs  Lab 06/07/18  0558 06/08/18  0434 06/09/18  0419 06/11/18  0604    139 140 145   K 4.9 5.1 4.5 4.1   * 109 106 103   CO2 20* 22* 25 31*   BUN 16 15 14 15   CREATININE 1.0 0.9 0.9 1.1   CALCIUM 7.6* 7.8* 7.7* 7.6*   PROT 6.1 5.8* 5.7*  --    BILITOT 0.4 0.4 0.4  --    ALKPHOS 105 93 95  --    ALT 13 12 11  --    AST 14 13 12  --      Lab Results   Component Value Date    LABPROT 11.2 06/04/2018    ALBUMIN 2.2 (L) 06/09/2018     Lab Results   Component Value Date    CALCIUM 7.6 (L) 06/11/2018    PHOS 3.9 06/11/2018     POCT Glucose   Date Value Ref Range Status   06/12/2018 178 (H) 70 - 110 mg/dL Final   06/12/2018 120 (H) 70 - 110 mg/dL Final   06/11/2018 111 (H) 70 - 110 mg/dL Final   06/11/2018 132 (H) 70 - 110 mg/dL Final   06/11/2018 113 (H) 70 - 110 mg/dL Final   06/11/2018 121 (H) 70 - 110 mg/dL Final   06/10/2018 118 (H) 70 - 110 mg/dL Final   06/10/2018 167 (H) 70 - 110 mg/dL Final   06/10/2018 113 (H) 70 - 110 mg/dL Final   06/10/2018 124 (H) 70 - 110 mg/dL Final   06/09/2018 150 (H) 70 - 110 mg/dL Final   06/09/2018 137 (H) 70 - 110 mg/dL  Final       Significant Imaging: na

## 2018-06-12 NOTE — PLAN OF CARE
Problem: Occupational Therapy Goal  Goal: Occupational Therapy Goal  Goals to be met by: 8 days     Patient will increase functional independence with ADLs by performing:    UE Dressing with Modified [place].  LE Dressing with Modified [place].  Grooming while standing at sink with Supervision.  Toileting from toilet with Modified [place] for hygiene and clothing management.   Bathing from  shower chair/bench with Supervision.  Supine to sit with Modified [place] /c HOB flat and no handrails.  Stand pivot transfers with Modified [place].  Toilet transfer to toilet with Modified [place].  Upper extremity exercise program x 15 reps per handout, with independence.  Patient will complete a functional dynamic standing activity x 10 min with S in order to complete a functional household task. --MET     Outcome: Ongoing (interventions implemented as appropriate)  Progressing. SANAZ Mitchell  6/12/2018

## 2018-06-12 NOTE — PLAN OF CARE
Problem: SLP Goal  Goal: SLP Goal  Goals to be met 6/18:   1) Pt will tolerate current diet of mechanical soft solids w thin liquids following aspiration precautions.   2) Pt will complete problem solving/reasoning tasks with 80% accy and min A.   3) Pt will name 12 items/1 minute with min A.   4) Pt will complete mental manipulation tasks with 80% accy and mod A.   5) Pt will undergo further assessment of reading, writing and visual spatial skills.    Outcome: Ongoing (interventions implemented as appropriate)  Change POC to 3x/wk. Continue current goals.  PABLO Montiel, CCC-SLP  Speech Language Pathologist  [phone]  6/12/2018

## 2018-06-12 NOTE — PT/OT/SLP PROGRESS
"Occupational Therapy  Treatment    Olimpia Cardoza   MRN: [id number]   Admitting Diagnosis: Embolic stroke involving right middle cerebral artery    OT Date of Treatment: 06/12/18  Total Time (min): 55 min    Billable Minutes:  Therapeutic Activity 25 and Therapeutic Exercise 30    General Precautions: Standard, aspiration, fall, respiratory  Orthopedic Precautions: N/A  Braces: N/A         Subjective:  Communicated with pt prior to session.  "The rib pain just comes"    Pain/Comfort  Pain Rating 1: 10/10  Location - Side 1: Right  Location - Orientation 1: posterior  Location 1: rib(s)  Pain Addressed 1: Pre-medicate for activity, Cessation of Activity  Pain Rating Post-Intervention 1: 10/10    Objective:  Patient found with: oxygen (seated in w/c)    Functional Status:  MDS G  Transfer Functional Status: S-SBA to stand from w/c  At table; pt t/f from w/c to bedside chair no AD with sup--pt hooked up O2 tubing with cues  Locomotion Off Unit Functional Status: total(A)-w/c mobility from gym to room  Moving from seated to standing position: Not steady, but able to stabilize without staff assistance  Walking (with assistive device if used): Not steady, but able to stabilize without staff assistance  Turning around and facing the opposite direction while walking: Not steady, but able to stabilize without staff assistance  Moving on and off the toilet: Not steady, but able to stabilize without staff assistance  Surface-to-surface transfer (transfer between bed and chair or wheelchair): Not steady, but able to stabilize without staff assistance           AMPA 6 Click:  AMPAC Total Score: 19    OT Exercises: UE Ergometer 15 min to increase functional endurance for ADLs  Rowing red theraband 10 repsx 3 sets    Additional Treatment:  1# dumb bell UE exercises seated x 15 reps through all planes and joints with sup and demonstration to perform correctly  Pt stood at table from w/c with sup to perform a standing reaching FM " pattern task and required min A for cues to complete correctly;then performed a sidestepping task along edge of table right and left t/fing objects with sup to SBA--total of 15 min to increase functional standing, tolerance and balance for household tasks        Patient left up in chair with all lines intact and call button in reach    ASSESSMENT:  Olimpia Cardoza is a 67 y.o. female with a medical diagnosis of Embolic stroke involving right middle cerebral artery and tolerated tx this date;however, cont with increased pain limiting function. Pt demonstrated increased standing tolerance and functional endurance. Pt with increased safety awareness and increased ability to perform a puzzle pattern with min A. Pt cont to benefit from OT address limitations and increase functional indep and sfaety to return to OF.    Rehab identified problem list/impairments: weakness, impaired endurance, impaired self care skills, gait instability, impaired functional mobilty, impaired balance, pain    Rehab potential is good    Activity tolerance: Good    Discharge recommendations: home with home health     Barriers to discharge: None     Equipment recommendations: bedside commode     GOALS:    Occupational Therapy Goals        Problem: Occupational Therapy Goal    Goal Priority Disciplines Outcome Interventions   Occupational Therapy Goal     OT, PT/OT Ongoing (interventions implemented as appropriate)    Description:  Goals to be met by: 8 days     Patient will increase functional independence with ADLs by performing:    UE Dressing with Modified [place].  LE Dressing with Modified [place].  Grooming while standing at sink with Supervision.  Toileting from toilet with Modified [place] for hygiene and clothing management.   Bathing from  shower chair/bench with Supervision.  Supine to sit with Modified [place] /c HOB flat and no handrails.  Stand pivot transfers with Modified [place].  Toilet transfer to  toilet with Modified [place].  Upper extremity exercise program x 15 reps per handout, with independence.  Patient will complete a functional dynamic standing activity x 10 min with S in order to complete a functional household task. --MET                       Plan:  Patient to be seen 5 x/week to address the above listed problems via self-care/home management, therapeutic activities, therapeutic exercises  Plan of Care expires: 07/10/18  Plan of Care reviewed with: patient, son    SANAZ Mitchell  06/12/2018

## 2018-06-12 NOTE — PROGRESS NOTES
[facility] - Skilled Nursing Care  Department of Hospital Medicine  Progress Note    Patient Name: Olimpia Cardoza  MRN: [id number]  Code Status: Full Code  Admission Date: 6/9/2018  Length of Stay: 3 days  Attending Physician: Zev Stanford MD  Primary Care Provider: Shad Burkett MD    Subjective:     Principal Problem:Embolic stroke involving right middle cerebral artery    Chief Complaint/Reason for Admission: CVA (cerebral vascular accident)    History of Present Illness:  Patient is a 67 y.o. female with HTN, HLP, DM2, COPD who presents to SNF after hospitalization for acute stroke requiring tPA.  She remains with minimal left sided weakness. She was wearing a cervical collar due to neck pain with negative x-rays and a poor cervical spine MRI with no gross abnormalities which was discontinued prior to discharge.  She has extensive bruising and has history of a fall prior to being found unresponsive.  Rib fractures present on imaging.  She complains of a cough with thick sputum. She has a chronic cough at home with green sputum but not as milky like currently.  She has chronic MUÑOZ which is stable and wears oxygen at home. The patient has been admitted to SNF for ongoing PT/OT due to insufficient progress to go home safely from the hospital.    Hospital Course:  The patient was admitted at [facility] from 6/4 to 6/9/2018.  6/9: Patient admitted to SNF for ongoing PT/Ot following a hospitalization R MCA stroke requiring tPA.  6/11: Patient seen at bedside doing well, reports soreness to right flank area and increase cough.  6/12: Patient seen at bedside, report improvement in in SOB and cough from additional dose of lasix.    Interval History: Patient seen at bedside, doing well, no acute events overnight. Son at bedside.    Review of Systems   Respiratory: Positive for cough. Negative for chest tightness, shortness of breath and wheezing.    Cardiovascular: Negative for chest pain, palpitations and leg swelling.    Musculoskeletal:        + right flank sorness     Scheduled Meds:   albuterol-ipratropium  3 mL Nebulization Q8H    amitriptyline  50 mg Oral QHS    aspirin  81 mg Oral Daily    atorvastatin  20 mg Oral Daily    baclofen  5 mg Oral QID    buPROPion  150 mg Oral Daily    busPIRone  5 mg Oral BID    dextromethorphan-guaifenesin  mg  1 tablet Oral BID    escitalopram oxalate  10 mg Oral Daily    fluticasone-vilanterol  1 puff Inhalation Daily    furosemide  20 mg Oral Daily    heparin (porcine)  5,000 Units Subcutaneous Q8H    levothyroxine  175 mcg Oral Daily    lidocaine  1 patch Transdermal Q24H    magnesium oxide  400 mg Oral BID    metoprolol tartrate  25 mg Oral Q8H    QUEtiapine  100 mg Oral QHS    ramelteon  8 mg Oral QHS    senna-docusate 8.6-50 mg  1 tablet Oral BID    sodium chloride 0.9%  3 mL Intravenous Q8H    tiotropium  1 capsule Inhalation Daily     Continuous Infusions:  PRN Meds:.acetaminophen, calcium carbonate, dextrose 50%, dextrose 50%, glucagon (human recombinant), glucose, HYDROcodone-acetaminophen, hydrOXYzine, insulin aspart U-100, ondansetron, ramelteon    Objective:     Vital Signs (Most Recent):  Temp: 98 °F (36.7 °C) (06/12/18 1005)  Pulse: 80 (06/12/18 1005)  Resp: 19 (06/12/18 1005)  BP: 110/76 (06/12/18 1005)  SpO2: 97 % (06/12/18 1005) Vital Signs (24h Range):  Temp:  [98 °F (36.7 °C)] 98 °F (36.7 °C)  Pulse:  [68-88] 80  Resp:  [18-20] 19  SpO2:  [93 %-97 %] 97 %  BP: (110-126)/(60-76) 110/76     Weight: 71.7 kg (158 lb 1.1 oz)  Body mass index is 29.87 kg/m².  No intake or output data in the 24 hours ending 06/12/18 1136   Physical Exam   Cardiovascular: Normal rate, regular rhythm and normal heart sounds.    No murmur heard.  Pulmonary/Chest: Effort normal and breath sounds normal. No respiratory distress. She has no wheezes. She has no rales.   Skin: Bruising noted.   bruising to BUE, right > left chest, hematoma to below right costal margin         Significant Labs:     Recent Labs  Lab 06/08/18  0434 06/09/18  0419 06/11/18  0604   WBC 7.47 5.97 6.33   HGB 7.9* 8.5* 8.4*   HCT 24.8* 26.5* 26.0*    244 275         Recent Labs  Lab 06/07/18  0558 06/08/18  0434 06/09/18  0419 06/11/18  0604    139 140 145   K 4.9 5.1 4.5 4.1   * 109 106 103   CO2 20* 22* 25 31*   BUN 16 15 14 15   CREATININE 1.0 0.9 0.9 1.1   CALCIUM 7.6* 7.8* 7.7* 7.6*   PROT 6.1 5.8* 5.7*  --    BILITOT 0.4 0.4 0.4  --    ALKPHOS 105 93 95  --    ALT 13 12 11  --    AST 14 13 12  --      Lab Results   Component Value Date    LABPROT 11.2 06/04/2018    ALBUMIN 2.2 (L) 06/09/2018     Lab Results   Component Value Date    CALCIUM 7.6 (L) 06/11/2018    PHOS 3.9 06/11/2018     POCT Glucose   Date Value Ref Range Status   06/12/2018 178 (H) 70 - 110 mg/dL Final   06/12/2018 120 (H) 70 - 110 mg/dL Final   06/11/2018 111 (H) 70 - 110 mg/dL Final   06/11/2018 132 (H) 70 - 110 mg/dL Final   06/11/2018 113 (H) 70 - 110 mg/dL Final   06/11/2018 121 (H) 70 - 110 mg/dL Final   06/10/2018 118 (H) 70 - 110 mg/dL Final   06/10/2018 167 (H) 70 - 110 mg/dL Final   06/10/2018 113 (H) 70 - 110 mg/dL Final   06/10/2018 124 (H) 70 - 110 mg/dL Final   06/09/2018 150 (H) 70 - 110 mg/dL Final   06/09/2018 137 (H) 70 - 110 mg/dL Final       Significant Imaging: na    Assessment/Plan:      * Right MCA ischemic stroke, aborted by t-PA    Evaluated on 6/12/18  · Improved  · Continue therapy with ASA, atorvastatin  · continue PT/OT to increase ambulation, ADL performance and endurance  · continue heparin for DVT prophylaxis  · continue fall precautions  · continue senokot-s to prevent constipation; hold for frequent or loose stooling  · F/U with vascular neurology as scheduled        Chronic congestive heart failure    Evaluated on 6/12/18  · Documented in record; Echo with nl EF but indeterminate diastolic function  · Continue therapy with lasix as she takes daily  · Will weigh daily to monitor  and adjust regimen as necessary  · Given extra dose of lasix with increase cough on 6/11--on 6/12 cough and SOB much improved per patient        Depression    Evaluated on 6/12/18  · Patient interactive and cooperative currently  · Continue chronic therapy with amitriptyline, buspirone, bupropion, excitalopram, quetiapine  · Continue ramelteon nightly for insomnia        Essential hypertension    Evaluated on 6/12/18  · HR stable at 80  · Improved control with metoprolol which was started for tachycardia  · Repeat EKG if HR consistently elevated  · will continue to monitor and adjust regimen as necessary            Keesha Gasca NP  Department of Hospital Medicine  [facility] [place] [facility] - Skilled Nursing Care

## 2018-06-12 NOTE — TELEPHONE ENCOUNTER
Dr. Hutchins,     Is this a pt. You need to follow? Or should they see someone else?     Please advise,  Paula

## 2018-06-12 NOTE — PT/OT/SLP PROGRESS
"Physical Therapy  Treatment    Olimpia Cardoza   MRN: [id number]   Admitting Diagnosis: Embolic stroke involving right middle cerebral artery    PT Received On: 06/12/18  Total Time (min): 90 (AM/PM 60 + 30)       Billable Minutes:90    Gait Training 22, Therapeutic Activity 28 and Therapeutic Exercise 40    Treatment Type: Treatment son present repts comfortable with bed mob,trfs,gait (has been getting her OOB,trfs and walking to bathroom)  PT/PTA: PTA     PTA Visit Number: 2       General Precautions: Standard, aspiration, fall, respiratory  Orthopedic Precautions: N/A   Braces: N/A    Do you have any cultural, spiritual, Restoration conflicts, given your current situation?: none    Subjective:  "I'll be ready for D/C on the 15th per pt and son      Pain/Comfort  Pain Rating 1: 6/10 (6 at rest, 10 with mvmt)  Location - Side 1: Right  Location - Orientation 1: posterior  Location 1: rib(s)  Pain Addressed 1: Pre-medicate for activity, Reposition, Distraction, Cessation of Activity, Nurse notified  Pain Rating Post-Intervention 1:  (inc with mobility, dec with rest)    Objective:  Patient found with:  (in BSC with 02 son present)       Functional Status:  MDS G  Transfer Functional Status: S-SBA  Walk in Corridor Functional Status: S-SBA          AM-PAC 6 CLICK MOBILITY  Total Score:18    Transfers: AM/PM  Sit<>Stand: SBA/S with RW vcs for handplacement  Stand Pivot Transfer: SBA/S no AD BSC<>WC<>nustep      Gait:AM/PM  Amb with RW SBA ~ 125 ft no LOB vcs for not lifting up RW 02 in tow ,  ft with RW SBA 02 in tow    Advanced Gait:AM/PM  Curb Step: asc/brown 4" curb with RW close SBA vcs for safety/tech x 2 trials      Therex: AM and PM 2x10 reps  2x15 reps AP,GS,LAQ,hip flex,abd/add    Additional Treatment:  Dyn std'g bal act tapping balloon with uni lat support on RW using BUE ~ 1:08 sec CG/SBA  Nu step x 13 min 2 brief rest breaks    Patient left up in chair with all lines intact, call button in reach, son present " and belongings in reach.    Assessment:  Olimpia Cardoza is a 67 y.o. female with a medical diagnosis of Embolic stroke involving right middle cerebral artery.  Pt tolerated well, pt would continue to benefit from skilled PT services to improve overall functional mobility, strength and endurance.  .    Rehab identified problem list/impairments: impaired endurance, gait instability, impaired balance, pain, impaired functional mobilty    Rehab potential is good.    Activity tolerance: Fair    Discharge recommendations: home health PT     Barriers to discharge: None    Equipment recommendations: bedside commode     GOALS:    Physical Therapy Goals        Problem: Physical Therapy Goal    Goal Priority Disciplines Outcome Goal Variances Interventions   Physical Therapy Goal     PT/OT, PT Ongoing (interventions implemented as appropriate)     Description:  Goals to be met by: 8 days     Patient will increase functional independence with mobility by performin. Supine to sit (HOB>30 degrees due to rib pain) with supervision.  2. Sit to supine (HOB>30 degrees due to rib pain) with supervision.  3. Sit to stand transfer with Supervision  4. Bed to chair transfer with Supervision using Rolling Walker  5. Gait  x 300 feet with Supervision using Rolling Walker with reciprocal gait pattern.   6. Ascend/descend 4 stairs with bilateral Handrails Stand-by to denote sufficient strength for stair negotiation in the community.  7. Ascend/Descend 4 inch curb step with Stand-by Assistance using Rolling Walker.  8. Stand for 5 minutes with Supervision using Rolling Walker and/or UE support on stable surface while performing an activity.  9. Lower extremity exercise program x 20 reps per handout, with independence.  10. Pt will improve gait speed, to denote a lower fall risk and improvement in balance, from 0.34 m/s to 0.47m/s with use of a rolling walker.                         PLAN:    Patient to be seen 5 x/week  to address the  above listed problems via    Plan of Care expires: 07/10/18  Plan of Care reviewed with: patient, son (2 sons)    Ophelia Freire, PTA  06/12/2018

## 2018-06-12 NOTE — PHYSICIAN QUERY
PT Name: Olimpia Cardoza  MR #: [id number]    Physician Query Form - Atrial Fibrillation Specificity     CDS/: Karen Houston               Contact information: [phone]     This form is a permanent document in the medical record.     Query Date: June 12, 2018    By submitting this query, we are merely seeking further clarification of documentation. Please utilize your independent clinical judgment when addressing the question(s) below.    The medical record contains the following:   Indicators     Supporting Clinical Findings Location in Medical Record   x Atrial Fibrillation A-fib     - new onset / [id number]  >2       Progress note 6/7 N V (Cuong/Mugi)    EKG results     x Medication - rate control with lopressor  - initiated NOAC    Progress note 6/7 N V (Cuong/Mugi)    Treatment      Other         Provider, please further specify the Atrial Fibrillation diagnosis.      [  ] Paroxysmal  [  ] Permanent  [  ] Persistent  [  ] Other (please specify): ____________________________  [x  ] Clinically Undetermined    Please document in your progress notes daily for the duration of treatment until resolved, and include in your discharge summary.

## 2018-06-12 NOTE — TELEPHONE ENCOUNTER
----- Message from Dinorah Evans sent at 6/12/2018 12:53 PM CDT -----  Contact: Shanti (skilled nursing) @ [phone]  Calling to schedule an appt with Dr. Hutchins, within 2 wks of discharge date of 6/15. Please call.

## 2018-06-12 NOTE — ASSESSMENT & PLAN NOTE
Evaluated on 6/12/18  · Documented in record; Echo with nl EF but indeterminate diastolic function  · Continue therapy with lasix as she takes daily  · Will weigh daily to monitor and adjust regimen as necessary  · Given extra dose of lasix with increase cough on 6/11--on 6/12 cough and SOB much improved per patient

## 2018-06-12 NOTE — ASSESSMENT & PLAN NOTE
Evaluated on 6/12/18  · Patient interactive and cooperative currently  · Continue chronic therapy with amitriptyline, buspirone, bupropion, excitalopram, quetiapine  · Continue ramelteon nightly for insomnia

## 2018-06-12 NOTE — ASSESSMENT & PLAN NOTE
Evaluated on 6/12/18  · Improved  · Continue therapy with ASA, atorvastatin  · continue PT/OT to increase ambulation, ADL performance and endurance  · continue heparin for DVT prophylaxis  · continue fall precautions  · continue senokot-s to prevent constipation; hold for frequent or loose stooling  · F/U with vascular neurology as scheduled

## 2018-06-13 LAB
MAGNESIUM SERPL-MCNC: 1.1 MG/DL
POCT GLUCOSE: 135 MG/DL (ref 70–110)
POCT GLUCOSE: 137 MG/DL (ref 70–110)
POCT GLUCOSE: 140 MG/DL (ref 70–110)
POCT GLUCOSE: 157 MG/DL (ref 70–110)

## 2018-06-13 PROCEDURE — 83735 ASSAY OF MAGNESIUM: CPT

## 2018-06-13 PROCEDURE — 99307 SBSQ NF CARE SF MDM 10: CPT | Mod: ,,, | Performed by: NURSE PRACTITIONER

## 2018-06-13 PROCEDURE — 94761 N-INVAS EAR/PLS OXIMETRY MLT: CPT

## 2018-06-13 PROCEDURE — 25000242 PHARM REV CODE 250 ALT 637 W/ HCPCS: Performed by: PHYSICIAN ASSISTANT

## 2018-06-13 PROCEDURE — 25000003 PHARM REV CODE 250: Performed by: PHYSICIAN ASSISTANT

## 2018-06-13 PROCEDURE — 94640 AIRWAY INHALATION TREATMENT: CPT

## 2018-06-13 PROCEDURE — 25000003 PHARM REV CODE 250: Performed by: NURSE PRACTITIONER

## 2018-06-13 PROCEDURE — 11000004 HC SNF PRIVATE

## 2018-06-13 PROCEDURE — 25000242 PHARM REV CODE 250 ALT 637 W/ HCPCS: Performed by: INTERNAL MEDICINE

## 2018-06-13 PROCEDURE — 27000221 HC OXYGEN, UP TO 24 HOURS

## 2018-06-13 PROCEDURE — 97110 THERAPEUTIC EXERCISES: CPT

## 2018-06-13 PROCEDURE — 36415 COLL VENOUS BLD VENIPUNCTURE: CPT

## 2018-06-13 PROCEDURE — 25000003 PHARM REV CODE 250: Performed by: INTERNAL MEDICINE

## 2018-06-13 PROCEDURE — 97116 GAIT TRAINING THERAPY: CPT

## 2018-06-13 PROCEDURE — 97530 THERAPEUTIC ACTIVITIES: CPT

## 2018-06-13 PROCEDURE — 63600175 PHARM REV CODE 636 W HCPCS: Performed by: PHYSICIAN ASSISTANT

## 2018-06-13 PROCEDURE — 99900035 HC TECH TIME PER 15 MIN (STAT)

## 2018-06-13 RX ORDER — FUROSEMIDE 20 MG/1
20 TABLET ORAL ONCE
Status: DISCONTINUED | OUTPATIENT
Start: 2018-06-13 | End: 2018-06-13

## 2018-06-13 RX ORDER — FUROSEMIDE 20 MG/1
20 TABLET ORAL 2 TIMES DAILY
Status: DISCONTINUED | OUTPATIENT
Start: 2018-06-13 | End: 2018-06-15 | Stop reason: HOSPADM

## 2018-06-13 RX ORDER — POLYETHYLENE GLYCOL 3350 17 G/17G
17 POWDER, FOR SOLUTION ORAL DAILY
Status: DISCONTINUED | OUTPATIENT
Start: 2018-06-13 | End: 2018-06-15 | Stop reason: HOSPADM

## 2018-06-13 RX ORDER — AMOXICILLIN 250 MG
2 CAPSULE ORAL 2 TIMES DAILY
Status: DISCONTINUED | OUTPATIENT
Start: 2018-06-14 | End: 2018-06-15 | Stop reason: HOSPADM

## 2018-06-13 RX ADMIN — FUROSEMIDE 20 MG: 20 TABLET ORAL at 10:06

## 2018-06-13 RX ADMIN — GUAIFENESIN AND DEXTROMETHORPHAN HYDROBROMIDE 1 TABLET: 600; 30 TABLET, EXTENDED RELEASE ORAL at 10:06

## 2018-06-13 RX ADMIN — INSULIN ASPART 2 UNITS: 100 INJECTION, SOLUTION INTRAVENOUS; SUBCUTANEOUS at 05:06

## 2018-06-13 RX ADMIN — BUSPIRONE HYDROCHLORIDE 5 MG: 5 TABLET ORAL at 10:06

## 2018-06-13 RX ADMIN — LIDOCAINE 1 PATCH: 50 PATCH TOPICAL at 02:06

## 2018-06-13 RX ADMIN — QUETIAPINE FUMARATE 100 MG: 25 TABLET ORAL at 08:06

## 2018-06-13 RX ADMIN — POLYETHYLENE GLYCOL 3350 17 G: 17 POWDER, FOR SOLUTION ORAL at 09:06

## 2018-06-13 RX ADMIN — BUPROPION HYDROCHLORIDE 150 MG: 150 TABLET, EXTENDED RELEASE ORAL at 10:06

## 2018-06-13 RX ADMIN — FLUTICASONE FUROATE AND VILANTEROL TRIFENATATE 1 PUFF: 200; 25 POWDER RESPIRATORY (INHALATION) at 10:06

## 2018-06-13 RX ADMIN — Medication 400 MG: at 10:06

## 2018-06-13 RX ADMIN — ASPIRIN 81 MG CHEWABLE TABLET 81 MG: 81 TABLET CHEWABLE at 10:06

## 2018-06-13 RX ADMIN — METOPROLOL TARTRATE 25 MG: 25 TABLET ORAL at 09:06

## 2018-06-13 RX ADMIN — HEPARIN SODIUM 5000 UNITS: 5000 INJECTION, SOLUTION INTRAVENOUS; SUBCUTANEOUS at 09:06

## 2018-06-13 RX ADMIN — IPRATROPIUM BROMIDE AND ALBUTEROL SULFATE 3 ML: .5; 3 SOLUTION RESPIRATORY (INHALATION) at 03:06

## 2018-06-13 RX ADMIN — ATORVASTATIN CALCIUM 20 MG: 20 TABLET, FILM COATED ORAL at 10:06

## 2018-06-13 RX ADMIN — BUSPIRONE HYDROCHLORIDE 5 MG: 5 TABLET ORAL at 08:06

## 2018-06-13 RX ADMIN — IPRATROPIUM BROMIDE AND ALBUTEROL SULFATE 3 ML: .5; 3 SOLUTION RESPIRATORY (INHALATION) at 11:06

## 2018-06-13 RX ADMIN — LEVOTHYROXINE SODIUM 175 MCG: 100 TABLET ORAL at 05:06

## 2018-06-13 RX ADMIN — HEPARIN SODIUM 5000 UNITS: 5000 INJECTION, SOLUTION INTRAVENOUS; SUBCUTANEOUS at 05:06

## 2018-06-13 RX ADMIN — BACLOFEN 5 MG: 10 TABLET ORAL at 08:06

## 2018-06-13 RX ADMIN — Medication 400 MG: at 08:06

## 2018-06-13 RX ADMIN — AMITRIPTYLINE HYDROCHLORIDE 50 MG: 50 TABLET, FILM COATED ORAL at 08:06

## 2018-06-13 RX ADMIN — GUAIFENESIN AND DEXTROMETHORPHAN HYDROBROMIDE 1 TABLET: 600; 30 TABLET, EXTENDED RELEASE ORAL at 08:06

## 2018-06-13 RX ADMIN — BACLOFEN 5 MG: 10 TABLET ORAL at 05:06

## 2018-06-13 RX ADMIN — RAMELTEON 8 MG: 8 TABLET, FILM COATED ORAL at 08:06

## 2018-06-13 RX ADMIN — IPRATROPIUM BROMIDE AND ALBUTEROL SULFATE 3 ML: .5; 3 SOLUTION RESPIRATORY (INHALATION) at 12:06

## 2018-06-13 RX ADMIN — HEPARIN SODIUM 5000 UNITS: 5000 INJECTION, SOLUTION INTRAVENOUS; SUBCUTANEOUS at 02:06

## 2018-06-13 RX ADMIN — TIOTROPIUM BROMIDE 18 MCG: 18 CAPSULE ORAL; RESPIRATORY (INHALATION) at 10:06

## 2018-06-13 RX ADMIN — BACLOFEN 5 MG: 10 TABLET ORAL at 10:06

## 2018-06-13 RX ADMIN — BACLOFEN 5 MG: 10 TABLET ORAL at 02:06

## 2018-06-13 RX ADMIN — ESCITALOPRAM OXALATE 10 MG: 10 TABLET ORAL at 10:06

## 2018-06-13 RX ADMIN — STANDARDIZED SENNA CONCENTRATE AND DOCUSATE SODIUM 1 TABLET: 8.6; 5 TABLET, FILM COATED ORAL at 08:06

## 2018-06-13 RX ADMIN — IPRATROPIUM BROMIDE AND ALBUTEROL SULFATE 3 ML: .5; 3 SOLUTION RESPIRATORY (INHALATION) at 08:06

## 2018-06-13 RX ADMIN — FUROSEMIDE 20 MG: 20 TABLET ORAL at 05:06

## 2018-06-13 NOTE — SUBJECTIVE & OBJECTIVE
Hospital Course:  The patient was admitted at [facility] [place] [place] [facility] from 6/4 to 6/9/2018.  6/9: Patient admitted to SNF for ongoing PT/Ot following a hospitalization R MCA stroke requiring tPA.  6/11: Patient seen at bedside doing well, reports soreness to right flank area and increase cough.  6/12: Patient seen at bedside, report improvement in in SOB and cough from additional dose of lasix.  6/13: Patient seen at bedside, reports increase in cough, weight increased change daily lasix to bid    Interval History: Patient seen at bedside, reports increase in cough, no acute events overnight. Son at bedside.    Review of Systems   Respiratory: Positive for cough. Negative for chest tightness, shortness of breath and wheezing.    Cardiovascular: Negative for chest pain, palpitations and leg swelling.   Musculoskeletal:        + right flank sorness     Scheduled Meds:   albuterol-ipratropium  3 mL Nebulization Q8H    amitriptyline  50 mg Oral QHS    aspirin  81 mg Oral Daily    atorvastatin  20 mg Oral Daily    baclofen  5 mg Oral QID    buPROPion  150 mg Oral Daily    busPIRone  5 mg Oral BID    dextromethorphan-guaifenesin  mg  1 tablet Oral BID    escitalopram oxalate  10 mg Oral Daily    fluticasone-vilanterol  1 puff Inhalation Daily    furosemide  20 mg Oral BID    heparin (porcine)  5,000 Units Subcutaneous Q8H    levothyroxine  175 mcg Oral Daily    lidocaine  1 patch Transdermal Q24H    magnesium oxide  400 mg Oral BID    metoprolol tartrate  25 mg Oral Q8H    QUEtiapine  100 mg Oral QHS    ramelteon  8 mg Oral QHS    senna-docusate 8.6-50 mg  1 tablet Oral BID    sodium chloride 0.9%  3 mL Intravenous Q8H    tiotropium  1 capsule Inhalation Daily     Continuous Infusions:  PRN Meds:.acetaminophen, calcium carbonate, dextrose 50%, dextrose 50%, glucagon (human recombinant), glucose, HYDROcodone-acetaminophen, hydrOXYzine, insulin aspart U-100, ondansetron, ramelteon    Objective:     Vital  Signs (Most Recent):  Temp: 98.3 °F (36.8 °C) (06/13/18 1007)  Pulse: 92 (06/13/18 1007)  Resp: (!) 21 (06/13/18 1007)  BP: 128/60 (06/13/18 1007)  SpO2: 98 % (06/13/18 1007) Vital Signs (24h Range):  Temp:  [97.9 °F (36.6 °C)-98.3 °F (36.8 °C)] 98.3 °F (36.8 °C)  Pulse:  [62-98] 92  Resp:  [18-22] 21  SpO2:  [94 %-98 %] 98 %  BP: ()/(53-83) 128/60     Weight: 74.6 kg (164 lb 7.4 oz)  Body mass index is 31.08 kg/m².    Intake/Output Summary (Last 24 hours) at 06/13/18 1234  Last data filed at 06/12/18 1746   Gross per 24 hour   Intake              520 ml   Output                0 ml   Net              520 ml      Physical Exam   Cardiovascular: Normal rate, regular rhythm and normal heart sounds.    No murmur heard.  Pulmonary/Chest: Effort normal and breath sounds normal. No respiratory distress. She has no wheezes. She has no rales.   Skin: Bruising noted.   bruising to BUE, right > left chest, hematoma to below right costal margin        Significant Labs:     Recent Labs  Lab 06/08/18  0434 06/09/18  0419 06/11/18  0604   WBC 7.47 5.97 6.33   HGB 7.9* 8.5* 8.4*   HCT 24.8* 26.5* 26.0*    244 275       Recent Labs  Lab 06/07/18  0558 06/08/18  0434 06/09/18  0419 06/11/18  0604    139 140 145   K 4.9 5.1 4.5 4.1   * 109 106 103   CO2 20* 22* 25 31*   BUN 16 15 14 15   CREATININE 1.0 0.9 0.9 1.1   CALCIUM 7.6* 7.8* 7.7* 7.6*   PROT 6.1 5.8* 5.7*  --    BILITOT 0.4 0.4 0.4  --    ALKPHOS 105 93 95  --    ALT 13 12 11  --    AST 14 13 12  --      Lab Results   Component Value Date    LABPROT 11.2 06/04/2018    ALBUMIN 2.2 (L) 06/09/2018     Lab Results   Component Value Date    CALCIUM 7.6 (L) 06/11/2018    PHOS 3.9 06/11/2018     POCT Glucose   Date Value Ref Range Status   06/13/2018 140 (H) 70 - 110 mg/dL Final   06/13/2018 137 (H) 70 - 110 mg/dL Final   06/12/2018 177 (H) 70 - 110 mg/dL Final   06/12/2018 102 70 - 110 mg/dL Final   06/12/2018 178 (H) 70 - 110 mg/dL Final   06/12/2018 120  (H) 70 - 110 mg/dL Final   06/11/2018 111 (H) 70 - 110 mg/dL Final   06/11/2018 132 (H) 70 - 110 mg/dL Final   06/11/2018 113 (H) 70 - 110 mg/dL Final   06/11/2018 121 (H) 70 - 110 mg/dL Final   06/10/2018 118 (H) 70 - 110 mg/dL Final   06/10/2018 167 (H) 70 - 110 mg/dL Final       Significant Imaging: na

## 2018-06-13 NOTE — ASSESSMENT & PLAN NOTE
Evaluated on 6/13/18  · Improved  · Continue therapy with ASA, atorvastatin  · continue PT/OT to increase ambulation, ADL performance and endurance  · continue heparin for DVT prophylaxis  · continue fall precautions  · continue senokot-s to prevent constipation; hold for frequent or loose stooling  · F/U with vascular neurology as scheduled

## 2018-06-13 NOTE — PROGRESS NOTES
[facility] - Skilled Nursing Care  Department of Hospital Medicine  Progress Note    Patient Name: Olimpia Cardoaz  MRN: [id number]  Code Status: Full Code  Admission Date: 6/9/2018  Length of Stay: 4 days  Attending Physician: Zev Stanford MD  Primary Care Provider: Shad Burkett MD    Subjective:     Principal Problem:Embolic stroke involving right middle cerebral artery    Chief Complaint/Reason for Admission: CVA (cerebral vascular accident)    History of Present Illness:  Patient is a 67 y.o. female with HTN, HLP, DM2, COPD who presents to SNF after hospitalization for acute stroke requiring tPA.  She remains with minimal left sided weakness. She was wearing a cervical collar due to neck pain with negative x-rays and a poor cervical spine MRI with no gross abnormalities which was discontinued prior to discharge.  She has extensive bruising and has history of a fall prior to being found unresponsive.  Rib fractures present on imaging.  She complains of a cough with thick sputum. She has a chronic cough at home with green sputum but not as milky like currently.  She has chronic MUÑOZ which is stable and wears oxygen at home. The patient has been admitted to SNF for ongoing PT/OT due to insufficient progress to go home safely from the hospital.    Hospital Course:  The patient was admitted at [facility] from 6/4 to 6/9/2018.  6/9: Patient admitted to SNF for ongoing PT/Ot following a hospitalization R MCA stroke requiring tPA.  6/11: Patient seen at bedside doing well, reports soreness to right flank area and increase cough.  6/12: Patient seen at bedside, report improvement in in SOB and cough from additional dose of lasix.  6/13: Patient seen at bedside, reports increase in cough, weight increased change daily lasix to bid    Interval History: Patient seen at bedside, reports increase in cough, no acute events overnight. Son at bedside.    Review of Systems   Respiratory: Positive for cough. Negative for chest  tightness, shortness of breath and wheezing.    Cardiovascular: Negative for chest pain, palpitations and leg swelling.   Musculoskeletal:        + right flank sorness     Scheduled Meds:   albuterol-ipratropium  3 mL Nebulization Q8H    amitriptyline  50 mg Oral QHS    aspirin  81 mg Oral Daily    atorvastatin  20 mg Oral Daily    baclofen  5 mg Oral QID    buPROPion  150 mg Oral Daily    busPIRone  5 mg Oral BID    dextromethorphan-guaifenesin  mg  1 tablet Oral BID    escitalopram oxalate  10 mg Oral Daily    fluticasone-vilanterol  1 puff Inhalation Daily    furosemide  20 mg Oral BID    heparin (porcine)  5,000 Units Subcutaneous Q8H    levothyroxine  175 mcg Oral Daily    lidocaine  1 patch Transdermal Q24H    magnesium oxide  400 mg Oral BID    metoprolol tartrate  25 mg Oral Q8H    QUEtiapine  100 mg Oral QHS    ramelteon  8 mg Oral QHS    senna-docusate 8.6-50 mg  1 tablet Oral BID    sodium chloride 0.9%  3 mL Intravenous Q8H    tiotropium  1 capsule Inhalation Daily     Continuous Infusions:  PRN Meds:.acetaminophen, calcium carbonate, dextrose 50%, dextrose 50%, glucagon (human recombinant), glucose, HYDROcodone-acetaminophen, hydrOXYzine, insulin aspart U-100, ondansetron, ramelteon    Objective:     Vital Signs (Most Recent):  Temp: 98.3 °F (36.8 °C) (06/13/18 1007)  Pulse: 92 (06/13/18 1007)  Resp: (!) 21 (06/13/18 1007)  BP: 128/60 (06/13/18 1007)  SpO2: 98 % (06/13/18 1007) Vital Signs (24h Range):  Temp:  [97.9 °F (36.6 °C)-98.3 °F (36.8 °C)] 98.3 °F (36.8 °C)  Pulse:  [62-98] 92  Resp:  [18-22] 21  SpO2:  [94 %-98 %] 98 %  BP: ()/(53-83) 128/60     Weight: 74.6 kg (164 lb 7.4 oz)  Body mass index is 31.08 kg/m².    Intake/Output Summary (Last 24 hours) at 06/13/18 1234  Last data filed at 06/12/18 1746   Gross per 24 hour   Intake              520 ml   Output                0 ml   Net              520 ml      Physical Exam   Cardiovascular: Normal rate, regular  rhythm and normal heart sounds.    No murmur heard.  Pulmonary/Chest: Effort normal and breath sounds normal. No respiratory distress. She has no wheezes. She has no rales.   Skin: Bruising noted.   bruising to BUE, right > left chest, hematoma to below right costal margin        Significant Labs:     Recent Labs  Lab 06/08/18  0434 06/09/18  0419 06/11/18  0604   WBC 7.47 5.97 6.33   HGB 7.9* 8.5* 8.4*   HCT 24.8* 26.5* 26.0*    244 275       Recent Labs  Lab 06/07/18  0558 06/08/18  0434 06/09/18  0419 06/11/18  0604    139 140 145   K 4.9 5.1 4.5 4.1   * 109 106 103   CO2 20* 22* 25 31*   BUN 16 15 14 15   CREATININE 1.0 0.9 0.9 1.1   CALCIUM 7.6* 7.8* 7.7* 7.6*   PROT 6.1 5.8* 5.7*  --    BILITOT 0.4 0.4 0.4  --    ALKPHOS 105 93 95  --    ALT 13 12 11  --    AST 14 13 12  --      Lab Results   Component Value Date    LABPROT 11.2 06/04/2018    ALBUMIN 2.2 (L) 06/09/2018     Lab Results   Component Value Date    CALCIUM 7.6 (L) 06/11/2018    PHOS 3.9 06/11/2018     POCT Glucose   Date Value Ref Range Status   06/13/2018 140 (H) 70 - 110 mg/dL Final   06/13/2018 137 (H) 70 - 110 mg/dL Final   06/12/2018 177 (H) 70 - 110 mg/dL Final   06/12/2018 102 70 - 110 mg/dL Final   06/12/2018 178 (H) 70 - 110 mg/dL Final   06/12/2018 120 (H) 70 - 110 mg/dL Final   06/11/2018 111 (H) 70 - 110 mg/dL Final   06/11/2018 132 (H) 70 - 110 mg/dL Final   06/11/2018 113 (H) 70 - 110 mg/dL Final   06/11/2018 121 (H) 70 - 110 mg/dL Final   06/10/2018 118 (H) 70 - 110 mg/dL Final   06/10/2018 167 (H) 70 - 110 mg/dL Final       Significant Imaging: na    Assessment/Plan:      * Right MCA ischemic stroke, aborted by t-PA    Evaluated on 6/13/18  · Improved  · Continue therapy with ASA, atorvastatin  · continue PT/OT to increase ambulation, ADL performance and endurance  · continue heparin for DVT prophylaxis  · continue fall precautions  · continue senokot-s to prevent constipation; hold for frequent or loose  stooling  · F/U with vascular neurology as scheduled        Chronic congestive heart failure    Evaluated on 6/12/18  · Documented in record; Echo with nl EF but indeterminate diastolic function  · Continue therapy with lasix as she takes daily  · Will weigh daily to monitor and adjust regimen as necessary  · Given extra dose of lasix with increase cough on 6/11--on 6/12 cough and SOB much improved per patient    Evaluated on 6/13/18  · Patient reports increase cough  · Weight increased  · Patient clarified that she is taking lasix 20mg bid at home and not daily  · Changed lasix to bid dosing   · Will monitor          Keesha Gasca NP  Department of Hospital Medicine  [facility]

## 2018-06-13 NOTE — ASSESSMENT & PLAN NOTE
Evaluated on 6/12/18  · Documented in record; Echo with nl EF but indeterminate diastolic function  · Continue therapy with lasix as she takes daily  · Will weigh daily to monitor and adjust regimen as necessary  · Given extra dose of lasix with increase cough on 6/11--on 6/12 cough and SOB much improved per patient    Evaluated on 6/13/18  · Patient reports increase cough  · Weight increased  · Patient clarified that she is taking lasix 20mg bid at home and not daily  · Changed lasix to bid dosing   · Will monitor

## 2018-06-13 NOTE — TELEPHONE ENCOUNTER
MD Paula Pierre MA   Caller: Unspecified (Yesterday,  4:00 PM)             No need to see me.   Schedule with someone else.

## 2018-06-13 NOTE — PLAN OF CARE
Problem: Physical Therapy Goal  Goal: Physical Therapy Goal  Goals to be met by: 8 days     Patient will increase functional independence with mobility by performin. Supine to sit (HOB>30 degrees due to rib pain) with supervision. met  2. Sit to supine (HOB>30 degrees due to rib pain) with supervision. met  3. Sit to stand transfer with Supervision met  4. Bed to chair transfer with Supervision using Rolling Walker met  5. Gait  x 300 feet with Supervision using Rolling Walker with reciprocal gait pattern.   6. Ascend/descend 4 stairs with bilateral Handrails Stand-by to denote sufficient strength for stair negotiation in the community.  7. Ascend/Descend 4 inch curb step with Stand-by Assistance using Rolling Walker.met  8. Stand for 5 minutes with Supervision using Rolling Walker and/or UE support on stable surface while performing an activity.  9. Lower extremity exercise program x 20 reps per handout, with independence.  10. Pt will improve gait speed, to denote a lower fall risk and improvement in balance, from 0.34 m/s to 0.47m/s with use of a rolling walker.        Outcome: Ongoing (interventions implemented as appropriate)  Goals remain appropriate

## 2018-06-13 NOTE — PLAN OF CARE
Problem: Occupational Therapy Goal  Goal: Occupational Therapy Goal  Goals to be met by: 8 days     Patient will increase functional independence with ADLs by performing:    UE Dressing with Modified [place].  LE Dressing with Modified [place].  Grooming while standing at sink with Supervision.  Toileting from toilet with Modified [place] for hygiene and clothing management.   Bathing from  shower chair/bench with Supervision.  Supine to sit with Modified [place] /c HOB flat and no handrails.  Stand pivot transfers with Modified [place].  Toilet transfer to toilet with Modified [place].  Upper extremity exercise program x 15 reps per handout, with independence.  Patient will complete a functional dynamic standing activity x 10 min with S in order to complete a functional household task. --MET      Outcome: Ongoing (interventions implemented as appropriate)  Patient's goals are appropriate.   SANAZ Ziegler  6/13/2018

## 2018-06-13 NOTE — PT/OT/SLP PROGRESS
"Physical Therapy  Treatment    Olimpia Cardoza   MRN: [id number]   Admitting Diagnosis: Embolic stroke involving right middle cerebral artery    PT Received On: 06/13/18  Total Time (min): 53       Billable Minutes:53    Gait Training 15, Therapeutic Activity 23 and Therapeutic Exercise 15    Treatment Type: Treatment son Billy present for trng (also repts comfortable with assisting mother with bed mob,trf,gait,toileting) ed/reviewed  with bed mob,trfs,gait,therex,curb,S/S of low BP/fall risk/strategies to inc BP, Pt reported little dizziness today with trf from WC>EOB and with stairs, nsg notified/present /60, subsided with rest    PT/PTA: PTA     PTA Visit Number: 3       General Precautions: Standard, aspiration, fall, respiratory  Orthopedic Precautions: N/A   Braces: N/A    Do you have any cultural, spiritual, Taoist conflicts, given your current situation?: none    Subjective:  "its hurting" agreeable to therapy      Pain/Comfort  Pain Rating 1: 10/10  Location - Side 1: Right  Location - Orientation 1: posterior  Location 1: rib(s)  Pain Addressed 1: Pre-medicate for activity, Reposition, Distraction, Cessation of Activity, Nurse notified  Pain Rating Post-Intervention 1: 10/10 (mostly with mvmt )    Objective:   Patient found with: oxygen (other son present (twin Billy) for trng (Saul was yesterday))       Functional Status:  MDS G  Bed Mobility Functional Status: S-SBA  Transfer Functional Status: S-SBA  Walk in Corridor Functional Status: S-SBA    MDS GG  Sit to lying Performance: Supervision or touching assistance.  Lying to sitting on side of bed Performance: Supervision or touching assistance.  Lying to sitting on side of bed Goal: Supervision or touching assistance.  Sit to Stand Performance: Supervision or touching assistance.  Chair/bed-to-chair transfer Performance: Supervision or touching assistance.  Does the resident walk?: Yes --> Continue to Walk 50 feet with two turns " "assessment  Walk 50 feet with two turns Performance: Supervision or touching assistance.  Walk 150 feet Performance: Supervision or touching assistance.     AM-PAC 6 CLICK MOBILITY  Total Score:18    Bed Mobility:  Sit>Supine:S with HOB elev 29*   Supine>Sit: S with HOB elev 29* also used RW next to bed for stability    Transfers:  Sit<>Stand: S with RW vcs for handplacement  Stand Pivot Transfer: S no AD BSC>WC<>EOB       Gait:  Amb with RW SBA/S ~ 185 ft 02 in tow    Advanced Gait:  Stairs: asc/brown 4 steps with BHR CGA vcs for safety/tech, reports of dizziness after turning at top of stairs, subsided with rest, BP taken after coming down and sitting in WC /60 nsg notified/ coming in gym to give meds  Curb Step: asc/brown 4" curb with RW close SBA vcs for safety/taking her time    Therex:  2x15 reps AP,GS,LAQ,hip lfex,abd/add    Additional Treatment:      Patient left up in chair with all lines intact, call button in reach, son present and belongings in reach.    Assessment:  Olimpia Cardoza is a 67 y.o. female with a medical diagnosis of Embolic stroke involving right middle cerebral artery.  Pt tolerated well, son present for fmly trng on pts overall functional mobility, level of A/S required to care for pt, receptive to education, appears to have very supportive sons, pt with repts of having some dizziness today, ed on S/S of low BP, strategies to inc BP, fall risk, nsg notified, pt would continue to benefit from skilled PT services to improve overall functional mobility, strength and endurance.  .    Rehab identified problem list/impairments: impaired endurance, gait instability, impaired balance, pain, impaired functional mobilty    Rehab potential is good.    Activity tolerance: Fair    Discharge recommendations: home health PT     Barriers to discharge: None    Equipment recommendations: bedside commode     GOALS:    Physical Therapy Goals        Problem: Physical Therapy Goal    Goal Priority Disciplines " Outcome Goal Variances Interventions   Physical Therapy Goal     PT/OT, PT Ongoing (interventions implemented as appropriate)     Description:  Goals to be met by: 8 days     Patient will increase functional independence with mobility by performin. Supine to sit (HOB>30 degrees due to rib pain) with supervision. met  2. Sit to supine (HOB>30 degrees due to rib pain) with supervision. met  3. Sit to stand transfer with Supervision met  4. Bed to chair transfer with Supervision using Rolling Walker met  5. Gait  x 300 feet with Supervision using Rolling Walker with reciprocal gait pattern.   6. Ascend/descend 4 stairs with bilateral Handrails Stand-by to denote sufficient strength for stair negotiation in the community.  7. Ascend/Descend 4 inch curb step with Stand-by Assistance using Rolling Walker.met  8. Stand for 5 minutes with Supervision using Rolling Walker and/or UE support on stable surface while performing an activity.  9. Lower extremity exercise program x 20 reps per handout, with independence.  10. Pt will improve gait speed, to denote a lower fall risk and improvement in balance, from 0.34 m/s to 0.47m/s with use of a rolling walker.                          PLAN:    Patient to be seen 5 x/week  to address the above listed problems via    Plan of Care expires: 07/10/18  Plan of Care reviewed with: patient, son (2 sons)    Ophelia Freire, PTA  2018

## 2018-06-13 NOTE — PT/OT/SLP PROGRESS
Occupational Therapy  Treatment    Olimpia Cardoza   MRN: [id number]   Admitting Diagnosis: Embolic stroke involving right middle cerebral artery    OT Date of Treatment: 06/13/18  Total Time (min): 55 min    Billable Minutes:  Therapeutic Activity 40 and Therapeutic Exercise 15    General Precautions: Standard, aspiration, fall, respiratory  Orthopedic Precautions: N/A  Braces: N/A     Subjective:  Communicated with patient prior to session.    Pain/Comfort  Pain Rating 1: 9/10  Location - Side 1: Right  Location - Orientation 1: posterior  Location 1: rib(s)  Pain Addressed 1: Reposition, Distraction  Pain Rating Post-Intervention 1: 9/10    Objective:       Functional Status:  MDS G  Transfer Functional Status: S bedside chair<>w/c stand pivot      AMPA 6 Click:  [facility] Total Score: 19    OT Exercises: Patient performed  B UE ROM exercises using 2# dowel geoff 1 x 20 in all planes and joints focusing to improve strength and endurance to increase independence with ADLs.      Additional Treatment:  Patient performed a visual perception activity using spatial relations board while standing with S for activity tolerance in order to complete a functional household task.     Patient performed a functional bimanual task folding cloths incorporating a FM component using clothespins while standing with S for activity tolerance in order to complete a functional household task.     Patient left up in chair with call button in reach, sons present and all needs met.     ASSESSMENT:  Olimpia Cardoza is a 67 y.o. female with a medical diagnosis of Embolic stroke involving right middle cerebral artery and presents with the deficits listed below. Patient tolerated treatment session and was motivated to complete tasks. Patient continues to benefit from skilled OT services to achieve maximal independence.    Rehab identified problem list/impairments: weakness, impaired endurance, impaired self care skills, gait instability, impaired  functional mobilty, impaired balance, pain    Rehab potential is good    Activity tolerance: Good    Discharge recommendations: home with home health     Barriers to discharge: None     Equipment recommendations: bedside commode     GOALS:    Occupational Therapy Goals        Problem: Occupational Therapy Goal    Goal Priority Disciplines Outcome Interventions   Occupational Therapy Goal     OT, PT/OT Ongoing (interventions implemented as appropriate)    Description:  Goals to be met by: 8 days     Patient will increase functional independence with ADLs by performing:    UE Dressing with Modified [place].  LE Dressing with Modified [place].  Grooming while standing at sink with Supervision.  Toileting from toilet with Modified [place] for hygiene and clothing management.   Bathing from  shower chair/bench with Supervision.  Supine to sit with Modified [place] /c HOB flat and no handrails.  Stand pivot transfers with Modified [place].  Toilet transfer to toilet with Modified [place].  Upper extremity exercise program x 15 reps per handout, with independence.  Patient will complete a functional dynamic standing activity x 10 min with S in order to complete a functional household task. --MET                       Plan:  Patient to be seen 5 x/week to address the above listed problems via self-care/home management, therapeutic activities, therapeutic exercises  Plan of Care expires: 07/10/18  Plan of Care reviewed with: patient, son    Cherri CHAIREZ SANAZ Morgan  06/13/2018

## 2018-06-13 NOTE — PLAN OF CARE
Problem: Patient Care Overview  Goal: Plan of Care Review  Outcome: Ongoing (interventions implemented as appropriate)   06/13/18 0035   Coping/Psychosocial   Plan Of Care Reviewed With patient       Problem: Fall Risk (Adult)  Goal: Identify Related Risk Factors and Signs and Symptoms  Related risk factors and signs and symptoms are identified upon initiation of Human Response Clinical Practice Guideline (CPG)   Outcome: Ongoing (interventions implemented as appropriate)   06/13/18 0035   Fall Risk   Related Risk Factors (Fall Risk) fatigue/slow reaction;fear of falling;gait/mobility problems

## 2018-06-14 LAB
ANION GAP SERPL CALC-SCNC: 7 MMOL/L
BASOPHILS # BLD AUTO: 0.02 K/UL
BASOPHILS NFR BLD: 0.3 %
BUN SERPL-MCNC: 18 MG/DL
CALCIUM SERPL-MCNC: 8 MG/DL
CHLORIDE SERPL-SCNC: 100 MMOL/L
CO2 SERPL-SCNC: 35 MMOL/L
CREAT SERPL-MCNC: 1.1 MG/DL
DIFFERENTIAL METHOD: ABNORMAL
EOSINOPHIL # BLD AUTO: 0.1 K/UL
EOSINOPHIL NFR BLD: 1.6 %
ERYTHROCYTE [DISTWIDTH] IN BLOOD BY AUTOMATED COUNT: 15.5 %
EST. GFR  (AFRICAN AMERICAN): 59.6 ML/MIN/1.73 M^2
EST. GFR  (NON AFRICAN AMERICAN): 51.7 ML/MIN/1.73 M^2
GLUCOSE SERPL-MCNC: 127 MG/DL
HCT VFR BLD AUTO: 25.9 %
HGB BLD-MCNC: 8.1 G/DL
IMM GRANULOCYTES # BLD AUTO: 0.2 K/UL
IMM GRANULOCYTES NFR BLD AUTO: 3.2 %
LYMPHOCYTES # BLD AUTO: 1.8 K/UL
LYMPHOCYTES NFR BLD: 29.4 %
MAGNESIUM SERPL-MCNC: 1.3 MG/DL
MCH RBC QN AUTO: 27.8 PG
MCHC RBC AUTO-ENTMCNC: 31.3 G/DL
MCV RBC AUTO: 89 FL
MONOCYTES # BLD AUTO: 0.8 K/UL
MONOCYTES NFR BLD: 12.7 %
NEUTROPHILS # BLD AUTO: 3.3 K/UL
NEUTROPHILS NFR BLD: 52.8 %
NRBC BLD-RTO: 0 /100 WBC
PHOSPHATE SERPL-MCNC: 4.1 MG/DL
PLATELET # BLD AUTO: 290 K/UL
PMV BLD AUTO: 9.6 FL
POCT GLUCOSE: 112 MG/DL (ref 70–110)
POCT GLUCOSE: 126 MG/DL (ref 70–110)
POCT GLUCOSE: 135 MG/DL (ref 70–110)
POCT GLUCOSE: 140 MG/DL (ref 70–110)
POTASSIUM SERPL-SCNC: 3.4 MMOL/L
RBC # BLD AUTO: 2.91 M/UL
SODIUM SERPL-SCNC: 142 MMOL/L
WBC # BLD AUTO: 6.16 K/UL

## 2018-06-14 PROCEDURE — 80048 BASIC METABOLIC PNL TOTAL CA: CPT

## 2018-06-14 PROCEDURE — 94640 AIRWAY INHALATION TREATMENT: CPT

## 2018-06-14 PROCEDURE — 97116 GAIT TRAINING THERAPY: CPT

## 2018-06-14 PROCEDURE — 63600175 PHARM REV CODE 636 W HCPCS: Performed by: PHYSICIAN ASSISTANT

## 2018-06-14 PROCEDURE — 25000003 PHARM REV CODE 250: Performed by: PHYSICIAN ASSISTANT

## 2018-06-14 PROCEDURE — 85025 COMPLETE CBC W/AUTO DIFF WBC: CPT

## 2018-06-14 PROCEDURE — 99900035 HC TECH TIME PER 15 MIN (STAT)

## 2018-06-14 PROCEDURE — 25000003 PHARM REV CODE 250: Performed by: INTERNAL MEDICINE

## 2018-06-14 PROCEDURE — 97535 SELF CARE MNGMENT TRAINING: CPT

## 2018-06-14 PROCEDURE — 97110 THERAPEUTIC EXERCISES: CPT

## 2018-06-14 PROCEDURE — 97530 THERAPEUTIC ACTIVITIES: CPT

## 2018-06-14 PROCEDURE — 92507 TX SP LANG VOICE COMM INDIV: CPT

## 2018-06-14 PROCEDURE — 94761 N-INVAS EAR/PLS OXIMETRY MLT: CPT

## 2018-06-14 PROCEDURE — 84100 ASSAY OF PHOSPHORUS: CPT

## 2018-06-14 PROCEDURE — 25000242 PHARM REV CODE 250 ALT 637 W/ HCPCS: Performed by: INTERNAL MEDICINE

## 2018-06-14 PROCEDURE — 83735 ASSAY OF MAGNESIUM: CPT

## 2018-06-14 PROCEDURE — 27000221 HC OXYGEN, UP TO 24 HOURS

## 2018-06-14 PROCEDURE — 36415 COLL VENOUS BLD VENIPUNCTURE: CPT

## 2018-06-14 PROCEDURE — 25000003 PHARM REV CODE 250: Performed by: NURSE PRACTITIONER

## 2018-06-14 PROCEDURE — 11000004 HC SNF PRIVATE

## 2018-06-14 PROCEDURE — 94664 DEMO&/EVAL PT USE INHALER: CPT

## 2018-06-14 PROCEDURE — 25000242 PHARM REV CODE 250 ALT 637 W/ HCPCS: Performed by: PHYSICIAN ASSISTANT

## 2018-06-14 RX ORDER — METFORMIN HYDROCHLORIDE 500 MG/1
500 TABLET ORAL 2 TIMES DAILY WITH MEALS
Qty: 180 TABLET | Refills: 3
Start: 2018-06-14 | End: 2019-06-14

## 2018-06-14 RX ORDER — POTASSIUM CHLORIDE 20 MEQ/1
40 TABLET, EXTENDED RELEASE ORAL ONCE
Status: COMPLETED | OUTPATIENT
Start: 2018-06-14 | End: 2018-06-14

## 2018-06-14 RX ORDER — FUROSEMIDE 20 MG/1
20 TABLET ORAL 2 TIMES DAILY
Qty: 60 TABLET | Refills: 6
Start: 2018-06-14

## 2018-06-14 RX ORDER — SIMVASTATIN 40 MG/1
40 TABLET, FILM COATED ORAL NIGHTLY
Qty: 90 TABLET | Refills: 3
Start: 2018-06-14 | End: 2019-06-14

## 2018-06-14 RX ORDER — HYDROCODONE BITARTRATE AND ACETAMINOPHEN 5; 325 MG/1; MG/1
1 TABLET ORAL EVERY 6 HOURS PRN
Qty: 21 TABLET | Refills: 0 | Status: ON HOLD | OUTPATIENT
Start: 2018-06-14 | End: 2018-06-20 | Stop reason: HOSPADM

## 2018-06-14 RX ORDER — ESCITALOPRAM OXALATE 10 MG/1
10 TABLET ORAL DAILY
Status: ON HOLD
Start: 2018-06-14 | End: 2018-06-20

## 2018-06-14 RX ADMIN — ESCITALOPRAM OXALATE 10 MG: 10 TABLET ORAL at 10:06

## 2018-06-14 RX ADMIN — HEPARIN SODIUM 5000 UNITS: 5000 INJECTION, SOLUTION INTRAVENOUS; SUBCUTANEOUS at 09:06

## 2018-06-14 RX ADMIN — IPRATROPIUM BROMIDE AND ALBUTEROL SULFATE 3 ML: .5; 3 SOLUTION RESPIRATORY (INHALATION) at 07:06

## 2018-06-14 RX ADMIN — METOPROLOL TARTRATE 25 MG: 25 TABLET ORAL at 02:06

## 2018-06-14 RX ADMIN — AMITRIPTYLINE HYDROCHLORIDE 50 MG: 50 TABLET, FILM COATED ORAL at 09:06

## 2018-06-14 RX ADMIN — BACLOFEN 5 MG: 10 TABLET ORAL at 10:06

## 2018-06-14 RX ADMIN — HEPARIN SODIUM 5000 UNITS: 5000 INJECTION, SOLUTION INTRAVENOUS; SUBCUTANEOUS at 02:06

## 2018-06-14 RX ADMIN — QUETIAPINE FUMARATE 100 MG: 25 TABLET ORAL at 09:06

## 2018-06-14 RX ADMIN — STANDARDIZED SENNA CONCENTRATE AND DOCUSATE SODIUM 2 TABLET: 8.6; 5 TABLET, FILM COATED ORAL at 09:06

## 2018-06-14 RX ADMIN — IPRATROPIUM BROMIDE AND ALBUTEROL SULFATE 3 ML: .5; 3 SOLUTION RESPIRATORY (INHALATION) at 04:06

## 2018-06-14 RX ADMIN — LIDOCAINE 1 PATCH: 50 PATCH TOPICAL at 02:06

## 2018-06-14 RX ADMIN — FUROSEMIDE 20 MG: 20 TABLET ORAL at 05:06

## 2018-06-14 RX ADMIN — ASPIRIN 81 MG CHEWABLE TABLET 81 MG: 81 TABLET CHEWABLE at 10:06

## 2018-06-14 RX ADMIN — RAMELTEON 8 MG: 8 TABLET, FILM COATED ORAL at 09:06

## 2018-06-14 RX ADMIN — METOPROLOL TARTRATE 25 MG: 25 TABLET ORAL at 09:06

## 2018-06-14 RX ADMIN — TIOTROPIUM BROMIDE 18 MCG: 18 CAPSULE ORAL; RESPIRATORY (INHALATION) at 10:06

## 2018-06-14 RX ADMIN — BACLOFEN 5 MG: 10 TABLET ORAL at 01:06

## 2018-06-14 RX ADMIN — BUSPIRONE HYDROCHLORIDE 5 MG: 5 TABLET ORAL at 09:06

## 2018-06-14 RX ADMIN — GUAIFENESIN AND DEXTROMETHORPHAN HYDROBROMIDE 1 TABLET: 600; 30 TABLET, EXTENDED RELEASE ORAL at 10:06

## 2018-06-14 RX ADMIN — LEVOTHYROXINE SODIUM 175 MCG: 100 TABLET ORAL at 05:06

## 2018-06-14 RX ADMIN — GUAIFENESIN AND DEXTROMETHORPHAN HYDROBROMIDE 1 TABLET: 600; 30 TABLET, EXTENDED RELEASE ORAL at 09:06

## 2018-06-14 RX ADMIN — BACLOFEN 5 MG: 10 TABLET ORAL at 09:06

## 2018-06-14 RX ADMIN — Medication 400 MG: at 09:06

## 2018-06-14 RX ADMIN — IPRATROPIUM BROMIDE AND ALBUTEROL SULFATE 3 ML: .5; 3 SOLUTION RESPIRATORY (INHALATION) at 11:06

## 2018-06-14 RX ADMIN — FUROSEMIDE 20 MG: 20 TABLET ORAL at 10:06

## 2018-06-14 RX ADMIN — HYDROCODONE BITARTRATE AND ACETAMINOPHEN 1 TABLET: 5; 325 TABLET ORAL at 06:06

## 2018-06-14 RX ADMIN — METOPROLOL TARTRATE 25 MG: 25 TABLET ORAL at 05:06

## 2018-06-14 RX ADMIN — ATORVASTATIN CALCIUM 20 MG: 20 TABLET, FILM COATED ORAL at 10:06

## 2018-06-14 RX ADMIN — BUPROPION HYDROCHLORIDE 150 MG: 150 TABLET, EXTENDED RELEASE ORAL at 10:06

## 2018-06-14 RX ADMIN — Medication 400 MG: at 10:06

## 2018-06-14 RX ADMIN — BACLOFEN 5 MG: 10 TABLET ORAL at 05:06

## 2018-06-14 RX ADMIN — POTASSIUM CHLORIDE 40 MEQ: 1500 TABLET, EXTENDED RELEASE ORAL at 10:06

## 2018-06-14 RX ADMIN — STANDARDIZED SENNA CONCENTRATE AND DOCUSATE SODIUM 2 TABLET: 8.6; 5 TABLET, FILM COATED ORAL at 10:06

## 2018-06-14 RX ADMIN — FLUTICASONE FUROATE AND VILANTEROL TRIFENATATE 1 PUFF: 200; 25 POWDER RESPIRATORY (INHALATION) at 10:06

## 2018-06-14 RX ADMIN — BUSPIRONE HYDROCHLORIDE 5 MG: 5 TABLET ORAL at 10:06

## 2018-06-14 RX ADMIN — POLYETHYLENE GLYCOL 3350 17 G: 17 POWDER, FOR SOLUTION ORAL at 10:06

## 2018-06-14 RX ADMIN — HEPARIN SODIUM 5000 UNITS: 5000 INJECTION, SOLUTION INTRAVENOUS; SUBCUTANEOUS at 05:06

## 2018-06-14 NOTE — PLAN OF CARE
Problem: SLP Goal  Goal: SLP Goal  Goals to be met 6/18:   1) Pt will tolerate current diet of mechanical soft solids w thin liquids following aspiration precautions.   2) Pt will complete problem solving/reasoning tasks with 80% accy and min A.   3) Pt will name 12 items/1 minute with min A.   4) Pt will complete mental manipulation tasks with 80% accy and mod A.   5) Pt will undergo further assessment of reading, writing and visual spatial skills.    Outcome: Unable to achieve outcome(s) by discharge  Pt did not achieve all of her goals; however, following continued assessment and discussion with pt and sons, it was determined that pt is likely at/near baseline.  No further SLP services to be ordered post discharge.  SLP to sign off at this time.  PABLO Montiel, CCC-SLP  Speech Language Pathologist  [phone]  6/14/2018

## 2018-06-14 NOTE — PLAN OF CARE
Problem: Occupational Therapy Goal  Goal: Occupational Therapy Goal  Goals to be met by: 8 days     Patient will increase functional independence with ADLs by performing:    UE Dressing with Modified [place].  LE Dressing with Modified [place].  Grooming while standing at sink with Supervision.--met  Toileting from toilet with Modified [place] for hygiene and clothing management. --met  Bathing from  shower chair/bench with Supervision.  Supine to sit with Modified [place] /c HOB flat and no handrails.-met  Stand pivot transfers with Modified [place].--met  Toilet transfer to toilet with Modified [place].--met  Upper extremity exercise program x 15 reps per handout, with independence.--met  Patient will complete a functional dynamic standing activity x 10 min with S in order to complete a functional household task. --MET      Outcome: Ongoing (interventions implemented as appropriate)  Progressing. SANAZ Mitchell  6/14/2018

## 2018-06-14 NOTE — PLAN OF CARE
[facility] [place] [facility]    HOME HEALTH ORDERS  FACE TO FACE ENCOUNTER    Patient Name: Olimpia Cardoza  YOB: 1950    PCP: Shad Burkett MD   PCP Address: [address] / [place] [address]  PCP Phone Number: [phone]  PCP Fax: [phone]    Encounter Date: 06/14/2018    Admit to Home Health    Diagnoses:  Active Hospital Problems    Diagnosis  POA    *Right MCA ischemic stroke, aborted by t-PA [I63.411]  Yes    Right rib fracture [S22.31XA]  Yes    Hematoma, chest wall [S20.219A]  Yes    Chronic respiratory failure with hypoxia [J96.11]  Yes    Chronic congestive heart failure [I50.9]  Yes    Anemia [D64.9]  Yes    COPD (chronic obstructive pulmonary disease) [J44.9]  Yes    Other specified hypothyroidism [E03.8]  Yes    Dyslipidemia [E78.5]  Yes    Essential hypertension [I10]  Yes    Type 2 diabetes mellitus, without long-term current use of insulin [E11.9]  Yes     17 yrs      Depression [F32.9]  Yes      Resolved Hospital Problems    Diagnosis Date Resolved POA   No resolved problems to display.       No future appointments.  Follow-up Information     Shad Burkett MD. Go on 6/19/2018.    Specialty:  Internal Medicine  Why:  Primary Care Hospital Follow-Up appointment scheduled for 11:45 am  Contact information:  [address]  [place] [address] [phone]               I have seen and examined this patient face to face today. My clinical findings that support the need for the home health skilled services and home bound status are the following:  Weakness/numbness causing balance and gait disturbance due to Stroke and Weakness/Debility making it taxing to leave home.    Allergies:  Review of patient's allergies indicates:   Allergen Reactions    Codeine Palpitations       Diet: cardiac diet and soft diet    Activities: activity as tolerated and no lifting, Driving, or Strenuous exercise    Nursing:   SN to complete comprehensive assessment including routine  vital signs. Instruct on disease process and s/s of complications to report to MD. Review/verify medication list sent home with the patient at time of discharge  and instruct patient/caregiver as needed. Frequency may be adjusted depending on start of care date.    Notify MD if SBP > 160 or < 90; DBP > 90 or < 50; HR > 120 or < 50; Temp > 101      CONSULTS:    Physical Therapy to evaluate and treat. Evaluate for home safety and equipment needs; Establish/upgrade home exercise program. Perform / instruct on therapeutic exercises, gait training, transfer training, and Range of Motion.  Occupational Therapy to evaluate and treat. Evaluate home environment for safety and equipment needs. Perform/Instruct on transfers, ADL training, ROM, and therapeutic exercises.  Aide to provide assistance with personal care, ADLs, and vital signs.    MISCELLANEOUS CARE:  Diabetic Care:   SN to perform and educate Diabetic management with blood glucose monitoring:, Fingerstick blood sugar AC and HS and Report CBG < 60 or > 350 to physician.    WOUND CARE ORDERS  n/a      Medications: Review discharge medications with patient and family and provide education.      Current Discharge Medication List      START taking these medications    Details   dextromethorphan-guaifenesin  mg (MUCINEX DM)  mg per 12 hr tablet Take 1 tablet by mouth 2 (two) times daily.  Qty: 20 tablet, Refills: 0      HYDROcodone-acetaminophen (NORCO) 5-325 mg per tablet Take 1 tablet by mouth every 6 (six) hours as needed for Pain.  Qty: 21 tablet, Refills: 0      metFORMIN (GLUCOPHAGE) 500 MG tablet Take 1 tablet (500 mg total) by mouth 2 (two) times daily with meals.  Qty: 180 tablet, Refills: 3      simvastatin (ZOCOR) 40 MG tablet Take 1 tablet (40 mg total) by mouth every evening.  Qty: 90 tablet, Refills: 3         CONTINUE these medications which have CHANGED    Details   escitalopram oxalate (LEXAPRO) 10 MG tablet Take 1 tablet (10 mg total) by  mouth once daily.      furosemide (LASIX) 20 MG tablet Take 1 tablet (20 mg total) by mouth 2 (two) times daily.  Qty: 60 tablet, Refills: 6         CONTINUE these medications which have NOT CHANGED    Details   albuterol (PROVENTIL) 5 mg/mL nebulizer solution Take 2.5 mg by nebulization 3 (three) times daily.       alendronate (FOSAMAX) 70 MG tablet Take 70 mg by mouth every 7 days.      ALPRAZolam (XANAX) 0.25 MG tablet Take 0.25 mg by mouth 2 (two) times daily as needed for Anxiety.      amitriptyline (ELAVIL) 50 MG tablet Take 50 mg by mouth every evening.      baclofen (LIORESAL) 10 MG tablet Take 10 mg by mouth 4 (four) times daily.      buPROPion (WELLBUTRIN XL) 300 MG 24 hr tablet Take 300 mg by mouth once daily.      fluticasone-vilanterol (BREO) 100-25 mcg/dose diskus inhaler Inhale 1 puff into the lungs once daily. Controller      glimepiride (AMARYL) 2 MG tablet Take 2 mg by mouth before breakfast.      hydrOXYzine (ATARAX) 50 MG tablet Take 50 mg by mouth 4 (four) times daily as needed.       levothyroxine (SYNTHROID, LEVOTHROID) 175 MCG tablet Take 175 mcg by mouth once daily.      metoprolol tartrate (LOPRESSOR) 25 MG tablet Take 1 tablet (25 mg total) by mouth every 8 (eight) hours.  Qty: 90 tablet, Refills: 11      quetiapine (SEROQUEL) 100 MG Tab Take 100 mg by mouth 2 (two) times daily.       tiotropium (SPIRIVA) 18 mcg inhalation capsule Inhale 18 mcg into the lungs once daily.      ascorbic acid (VITAMIN C) 500 MG tablet Take 500 mg by mouth once daily.      aspirin 81 MG Chew Take 81 mg by mouth every evening. Two 81mg  Once a day      busPIRone (BUSPAR) 30 MG Tab Take 30 mg by mouth 2 (two) times daily.      calcium carbonate (OS-CAREY) 600 mg (1,500 mg) Tab Take 600 mg by mouth 2 (two) times daily with meals.      pantoprazole (PROTONIX) 40 MG tablet Take 40 mg by mouth once daily.         STOP taking these medications       meloxicam (MOBIC) 7.5 MG tablet Comments:   Reason for Stopping:          metFORMIN (GLUMETZA) 1000 MG (MOD) 24 hr tablet Comments:   Reason for Stopping:         atorvastatin (LIPITOR) 20 MG tablet Comments:   Reason for Stopping:               I certify that this patient is confined to her home and needs intermittent skilled nursing care, physical therapy and occupational therapy.

## 2018-06-14 NOTE — PLAN OF CARE
Problem: Patient Care Overview  Goal: Plan of Care Review  Outcome: Ongoing (interventions implemented as appropriate)   06/14/18 0048   Coping/Psychosocial   Plan Of Care Reviewed With patient       Problem: Fall Risk (Adult)  Goal: Identify Related Risk Factors and Signs and Symptoms  Related risk factors and signs and symptoms are identified upon initiation of Human Response Clinical Practice Guideline (CPG)   Outcome: Ongoing (interventions implemented as appropriate)   06/14/18 0048   Fall Risk   Related Risk Factors (Fall Risk) fatigue/slow reaction;fear of falling;gait/mobility problems

## 2018-06-14 NOTE — PT/OT/SLP PROGRESS
"Speech Language Pathology  Treatment/Discharge    Olimpia Cardoza   MRN: [id number]   Admitting Diagnosis: Embolic stroke involving right middle cerebral artery    Diet recommendations: Solid Diet Level: Mechanical soft, Chopped meat, No Rice  Liquid Diet Level: Thin 1 bite/sip at a time, Alternating bites/sips, Avoid talking while eating, Feed only when awake/alert, HOB to 90 degrees, Meds whole 1 at a time, Monitor for s/s of aspiration, Small bites/sips, Standard aspiration precautions and Wear oxygen during intake    SLP Treatment Date: 06/14/18  Speech Start Time: 1543     Speech Stop Time: 1614     Speech Total (min): 31 min       TREATMENT BILLABLE MINUTES:  Speech Therapy Individual 23 and Seld Care/Home Management Training 8    Has the patient been evaluated by SLP for swallowing? : Yes  Keep patient NPO?: No   General Precautions: Standard, aspiration, fall  Current Respiratory Status: nasal cannula       Subjective:  "Now I'm starting to run out." pt commenting during a word fluency task.        Objective:   Patient found with: oxygen    Pt seen sitting up at EOB with son present this afternoon. Pt pleasant and cooperative.  During 3 word fluency trials, pt listed 12 vegetables, 12 fruits, and 10 drinks in one minute per category given min cues.  Pt compared 2 given items with 80% acc. Ind'ly/100% given cues. She contrasted the 2 items with 40% accuracy ind'ly/100% given cues.  Pt provided 2 uses for common objects with cue x 1 over 5 trials.  During a word list retention task, pt recalled words by attribute from fo4 with 60% acc. Ind'ly/100% given repetitions x 3 and verbal cues x 1.  Education provided to pt and son regarding role of SLP, possible effects on cognition s/p stroke, cognitive therapy, short term vs long term memory deficits, and treatment plan. Pt's son agrees with his brothers who stated that pt is at/near baseline for cognition.  However, pt/son stated they were agreeable to undergoing HH " SLP evaluation if this were recommended by the interdisciplinary team.  At conclusion of session, SLP noted that no further SLP services were orders in pt's discharge orders (pt discharging home tomorrow with family). SLP feels this is appropriate.  SLP evaluation may be ordered if cognitive deficits become more apparent in home environment post discharge. SLP to discontinue current tx orders.     Assessment:  Olimpia Cardoza is a 67 y.o. female with a medical diagnosis of Embolic stroke involving right middle cerebral artery.  Pt appearing to be at/near baseline for speech, language, cognition, and swallowing functions. No further SLP services to be recommended at this time.    Discharge recommendations: Discharge Facility/Level Of Care Needs:  (no further ST services warranted upon discharge as pt/family feel pt is at/near baseline for cognition and swallowing)     Goals:    SLP Goals        Problem: SLP Goal    Goal Priority Disciplines Outcome   SLP Goal     SLP Unable to achieve outcome(s) by discharge   Description:  Goals to be met 6/18:   1) Pt will tolerate current diet of mechanical soft solids w thin liquids following aspiration precautions.   2) Pt will complete problem solving/reasoning tasks with 80% accy and min A.   3) Pt will name 12 items/1 minute with min A.   4) Pt will complete mental manipulation tasks with 80% accy and mod A.   5) Pt will undergo further assessment of reading, writing and visual spatial skills.                      Plan:   Plan of Care reviewed with: patient, son  SLP Follow-up?: No              PABLO Montiel, CCC-SLP  06/14/2018     PABLO Montiel, CCC-SLP  Speech Language Pathologist  [phone]  6/14/2018

## 2018-06-14 NOTE — PT/OT/SLP PROGRESS
"Physical Therapy  Treatment    Olimpia Cardoza   MRN: [id number]   Admitting Diagnosis: Embolic stroke involving right middle cerebral artery    PT Received On: 06/14/18  Total Time (min): 60       Billable Minutes:60    Gait Training 15, Therapeutic Activity 15 and Therapeutic Exercise 30    Treatment Type: Treatment  PT/PTA: PTA     PTA Visit Number: 4       General Precautions: Standard, aspiration, fall, respiratory  Orthopedic Precautions: N/A   Braces: N/A    Do you have any cultural, spiritual, Sikh conflicts, given your current situation?: none    Subjective:  "alright"      Pain/Comfort  Pain Rating 1: 10/10  Location - Side 1: Right  Location - Orientation 1: posterior  Location 1: rib(s)  Pain Addressed 1: Pre-medicate for activity, Reposition, Distraction, Cessation of Activity, Nurse notified (also "my stomach, I have 2 hernia")  Pain Rating Post-Intervention 1: 10/10    Objective:  \ Patient found with: oxygen sitting EOB       Functional Status:  MDS G  Transfer Functional Status: S-SBA  Walk in Corridor Functional Status: S-SBA    MDS GG  Sit to Stand Performance: Supervision or touching assistance.  Chair/bed-to-chair transfer Performance: Supervision or touching assistance.  Does the resident walk?: Yes --> Continue to Walk 50 feet with two turns assessment  Walk 50 feet with two turns Performance: Supervision or touching assistance.  Walk 150 feet Performance: Supervision or touching assistance.     AM-PAC 6 CLICK MOBILITY  Total Score:18    Transfers:  Sit<>Stand: with RW S occ vcs for hand placement  Stand Pivot Transfer: S no AD EOB>WC<>nustep      Gait:  Amb with RW S no LOB, 02 in tow ~ 150 ft and 120 ft with seated rest break    Advanced Gait:  Curb Step: asc/brown 4" curb with RW SBA    Therex:  2x15 reps AP,GS,LAQ,hip flex,abd/add    Balance:  Std activity bal/tolerance with RW S uni lat support on counter with spatial board  ~6:20 sec, able to problem solve appropriate spaces, switched out " wrong sizes    Additional Treatment:  nustep x 15 min L1 2 brief rest breaks    Patient left up in chair with call button in reach, belongings in reach, son  present     Assessment:  Olimpia Cardoza is a 67 y.o. female with a medical diagnosis of Embolic stroke involving right middle cerebral artery.  Pt tolerated well, pt would continue to benefit from skilled PT services to improve overall functional mobility, strength and endurance.      Rehab identified problem list/impairments: impaired endurance, gait instability, impaired balance, pain, impaired functional mobilty    Rehab potential is good.    Activity tolerance: Fair    Discharge recommendations: home health PT     Barriers to discharge: None    Equipment recommendations: bedside commode     GOALS:    Physical Therapy Goals        Problem: Physical Therapy Goal    Goal Priority Disciplines Outcome Goal Variances Interventions   Physical Therapy Goal     PT/OT, PT Ongoing (interventions implemented as appropriate)     Description:  Goals to be met by: 8 days     Patient will increase functional independence with mobility by performin. Supine to sit (HOB>30 degrees due to rib pain) with supervision. met  2. Sit to supine (HOB>30 degrees due to rib pain) with supervision. met  3. Sit to stand transfer with Supervision met  4. Bed to chair transfer with Supervision using Rolling Walker met  5. Gait  x 300 feet with Supervision using Rolling Walker with reciprocal gait pattern.   6. Ascend/descend 4 stairs with bilateral Handrails Stand-by to denote sufficient strength for stair negotiation in the community.  7. Ascend/Descend 4 inch curb step with Stand-by Assistance using Rolling Walker.met  8. Stand for 5 minutes with Supervision using Rolling Walker and/or UE support on stable surface while performing an activity. met  9. Lower extremity exercise program x 20 reps per handout, with independence. met  10. Pt will improve gait speed, to denote a lower  fall risk and improvement in balance, from 0.34 m/s to 0.47m/s with use of a rolling walker.                           PLAN:    Patient to be seen 5 x/week  to address the above listed problems via    Plan of Care expires: 07/10/18  Plan of Care reviewed with: patient, son (2 sons)    Ophelia Freire, PTA  06/14/2018

## 2018-06-14 NOTE — PT/OT/SLP PROGRESS
"Occupational Therapy  Treatment    Olimpia Cardoza   MRN: [id number]   Admitting Diagnosis: Embolic stroke involving right middle cerebral artery    OT Date of Treatment: 06/14/18  Total Time (min): 62 min    Billable Minutes:  Therapeutic Activity 22 and Therapeutic Exercise 40    General Precautions: Standard, aspiration, fall, respiratory  Orthopedic Precautions: N/A  Braces: N/A         Subjective:  Communicated with pt and son prior to session.  "I have not gotten a lot of sleep lately"    Pain/Comfort  Pain Rating 1: 6/10  Location - Side 1: Right  Location - Orientation 1: posterior  Location 1: rib(s)  Pain Addressed 1: Pre-medicate for activity, Cessation of Activity  Pain Rating Post-Intervention 1: 6/10    Objective:  Patient found with: oxygen (supine in bed with son present)    Functional Status:  MDS G  Bed Mobility Functional Status: mod(I) - supine to and from sit  Transfer Functional Status: mod(I) - with RW from bed to w/c; stand from w/c at table; w/c to bed with RW  Walk in Room Functional Status: mod I RW  Locomotion Off Unit Functional Status: Sup to propel w/c approx 150ft --cues for direction and assist with O2 tank  Dressing Functional Status: setup to nikki socks  Moving from seated to standing position: Not steady, but able to stabilize without staff assistance  Walking (with assistive device if used): Not steady, but able to stabilize without staff assistance  Turning around and facing the opposite direction while walking: Not steady, but able to stabilize without staff assistance  Moving on and off the toilet: Not steady, but able to stabilize without staff assistance  Surface-to-surface transfer (transfer between bed and chair or wheelchair): Not steady, but able to stabilize without staff assistance     MDS GG  Eating Performance: Independent.  Oral Hygiene Performance: Independent.  Toileting Hygiene Performance: Setup or clean-up assistance  Sit to lying Performance: Independent  Lying to " sitting on side of bed Performance: Independent.  Sit to Stand Performance: Independent.  Chair/bed-to-chair transfer Performance: Independent.  Toilet transfer Performance: Independent.  Toilet transfer Goal: Independent.  Does the resident use a wheelchair/scooter?: Yes --> Continue to Wheel 50 feet with two turns assessment  Wheel 50 feet with two turns Performance: Supervision or touching assistance.  Indicate the type of wheelchair/scooter used: Manual  Wheel 150 feet Performance: Supervision or touching assistance.  Indicate the type of wheelchair/scooter used: Manual     AMPAC 6 Click:  [facility] Total Score: 21    OT Exercises: UE Ergometer 15 min to increase functional endurance for ADLs  Lat pull downs red theraband 15 x 2  Rowing re theraband 15 x 2    Additional Treatment:  Chest press: red theraband 15 x 2  Pt performed 1# dumb bells 15 x 2 through all planes and joints seated with sup  Pt stood at table x 10 min and performed spatial relations board no assistance and sidestepped along table right and left for 2 min  Pt's son educated re: assistance needed with self care and t/fs--pt has good safety awareness. Son is indep with assisting pt  Pt re-educated re: stand to sit as pt did not reach back to chair prior to sit--reiterated safety issues and fall risk    Patient left EOB with son present    ASSESSMENT:  Olimpia Cardoza is a 67 y.o. female with a medical diagnosis of Embolic stroke involving right middle cerebral artery and tolerated tx. Pt is progressing toward goals and family is indep with assisting pt as needed. Pt demonstrated increased indep with t/fs, bed mobility and standing tolerance. Pt has good safety awareness and energy conservation skills. Pt cont to benefit from OT to address limitaitons and increase functional indep and safety to return home with family assist.    Rehab identified problem list/impairments: weakness, impaired endurance, impaired self care skills, gait instability,  impaired functional mobilty, impaired balance, pain    Rehab potential is good    Activity tolerance: Good    Discharge recommendations: home with home health     Barriers to discharge: None     Equipment recommendations: bedside commode     GOALS:    Occupational Therapy Goals        Problem: Occupational Therapy Goal    Goal Priority Disciplines Outcome Interventions   Occupational Therapy Goal     OT, PT/OT Ongoing (interventions implemented as appropriate)    Description:  Goals to be met by: 8 days     Patient will increase functional independence with ADLs by performing:    UE Dressing with Modified [place].  LE Dressing with Modified [place].  Grooming while standing at sink with Supervision.--met  Toileting from toilet with Modified [place] for hygiene and clothing management. --met  Bathing from  shower chair/bench with Supervision.  Supine to sit with Modified [place] /c HOB flat and no handrails.-met  Stand pivot transfers with Modified [place].--met  Toilet transfer to toilet with Modified [place].--met  Upper extremity exercise program x 15 reps per handout, with independence.--met  Patient will complete a functional dynamic standing activity x 10 min with S in order to complete a functional household task. --MET                        Plan:  Patient to be seen 5 x/week to address the above listed problems via self-care/home management, therapeutic activities, therapeutic exercises  Plan of Care expires: 07/10/18  Plan of Care reviewed with: patient, paulie    SANAZ Mitchell  06/14/2018

## 2018-06-14 NOTE — PLAN OF CARE
Problem: Physical Therapy Goal  Goal: Physical Therapy Goal  Goals to be met by: 8 days     Patient will increase functional independence with mobility by performin. Supine to sit (HOB>30 degrees due to rib pain) with supervision. met  2. Sit to supine (HOB>30 degrees due to rib pain) with supervision. met  3. Sit to stand transfer with Supervision met  4. Bed to chair transfer with Supervision using Rolling Walker met  5. Gait  x 300 feet with Supervision using Rolling Walker with reciprocal gait pattern.   6. Ascend/descend 4 stairs with bilateral Handrails Stand-by to denote sufficient strength for stair negotiation in the community.  7. Ascend/Descend 4 inch curb step with Stand-by Assistance using Rolling Walker.met  8. Stand for 5 minutes with Supervision using Rolling Walker and/or UE support on stable surface while performing an activity. met  9. Lower extremity exercise program x 20 reps per handout, with independence. met  10. Pt will improve gait speed, to denote a lower fall risk and improvement in balance, from 0.34 m/s to 0.47m/s with use of a rolling walker.         Outcome: Ongoing (interventions implemented as appropriate)  Goals remain appropriate

## 2018-06-15 VITALS
OXYGEN SATURATION: 95 % | TEMPERATURE: 98 F | BODY MASS INDEX: 30.63 KG/M2 | WEIGHT: 162.25 LBS | HEART RATE: 82 BPM | DIASTOLIC BLOOD PRESSURE: 64 MMHG | SYSTOLIC BLOOD PRESSURE: 134 MMHG | RESPIRATION RATE: 18 BRPM | HEIGHT: 61 IN

## 2018-06-15 LAB
POCT GLUCOSE: 105 MG/DL (ref 70–110)
POCT GLUCOSE: 107 MG/DL (ref 70–110)

## 2018-06-15 PROCEDURE — 99316 NF DSCHRG MGMT 30 MIN+: CPT | Mod: ,,, | Performed by: HOSPITALIST

## 2018-06-15 PROCEDURE — 97530 THERAPEUTIC ACTIVITIES: CPT

## 2018-06-15 PROCEDURE — 25000242 PHARM REV CODE 250 ALT 637 W/ HCPCS: Performed by: NURSE PRACTITIONER

## 2018-06-15 PROCEDURE — 94761 N-INVAS EAR/PLS OXIMETRY MLT: CPT

## 2018-06-15 PROCEDURE — 97535 SELF CARE MNGMENT TRAINING: CPT

## 2018-06-15 PROCEDURE — 25000003 PHARM REV CODE 250: Performed by: INTERNAL MEDICINE

## 2018-06-15 PROCEDURE — 25000242 PHARM REV CODE 250 ALT 637 W/ HCPCS: Performed by: INTERNAL MEDICINE

## 2018-06-15 PROCEDURE — 99900058 HC 022 PAID UNDER SNF PPS

## 2018-06-15 PROCEDURE — 25000242 PHARM REV CODE 250 ALT 637 W/ HCPCS: Performed by: PHYSICIAN ASSISTANT

## 2018-06-15 PROCEDURE — 27000221 HC OXYGEN, UP TO 24 HOURS

## 2018-06-15 PROCEDURE — 94640 AIRWAY INHALATION TREATMENT: CPT

## 2018-06-15 PROCEDURE — 97116 GAIT TRAINING THERAPY: CPT

## 2018-06-15 PROCEDURE — 63600175 PHARM REV CODE 636 W HCPCS: Performed by: PHYSICIAN ASSISTANT

## 2018-06-15 PROCEDURE — 25000003 PHARM REV CODE 250: Performed by: PHYSICIAN ASSISTANT

## 2018-06-15 PROCEDURE — 25000003 PHARM REV CODE 250: Performed by: NURSE PRACTITIONER

## 2018-06-15 PROCEDURE — 97110 THERAPEUTIC EXERCISES: CPT

## 2018-06-15 RX ORDER — IPRATROPIUM BROMIDE AND ALBUTEROL SULFATE 2.5; .5 MG/3ML; MG/3ML
3 SOLUTION RESPIRATORY (INHALATION) EVERY 6 HOURS PRN
Status: DISCONTINUED | OUTPATIENT
Start: 2018-06-15 | End: 2018-06-15 | Stop reason: HOSPADM

## 2018-06-15 RX ADMIN — FLUTICASONE FUROATE AND VILANTEROL TRIFENATATE 1 PUFF: 200; 25 POWDER RESPIRATORY (INHALATION) at 08:06

## 2018-06-15 RX ADMIN — IPRATROPIUM BROMIDE AND ALBUTEROL SULFATE 3 ML: .5; 3 SOLUTION RESPIRATORY (INHALATION) at 07:06

## 2018-06-15 RX ADMIN — BACLOFEN 5 MG: 10 TABLET ORAL at 12:06

## 2018-06-15 RX ADMIN — HEPARIN SODIUM 5000 UNITS: 5000 INJECTION, SOLUTION INTRAVENOUS; SUBCUTANEOUS at 05:06

## 2018-06-15 RX ADMIN — TIOTROPIUM BROMIDE 18 MCG: 18 CAPSULE ORAL; RESPIRATORY (INHALATION) at 08:06

## 2018-06-15 RX ADMIN — METOPROLOL TARTRATE 25 MG: 25 TABLET ORAL at 01:06

## 2018-06-15 RX ADMIN — FUROSEMIDE 20 MG: 20 TABLET ORAL at 08:06

## 2018-06-15 RX ADMIN — STANDARDIZED SENNA CONCENTRATE AND DOCUSATE SODIUM 2 TABLET: 8.6; 5 TABLET, FILM COATED ORAL at 08:06

## 2018-06-15 RX ADMIN — METOPROLOL TARTRATE 25 MG: 25 TABLET ORAL at 05:06

## 2018-06-15 RX ADMIN — ESCITALOPRAM OXALATE 10 MG: 10 TABLET ORAL at 08:06

## 2018-06-15 RX ADMIN — GUAIFENESIN AND DEXTROMETHORPHAN HYDROBROMIDE 1 TABLET: 600; 30 TABLET, EXTENDED RELEASE ORAL at 08:06

## 2018-06-15 RX ADMIN — LEVOTHYROXINE SODIUM 175 MCG: 100 TABLET ORAL at 05:06

## 2018-06-15 RX ADMIN — HEPARIN SODIUM 5000 UNITS: 5000 INJECTION, SOLUTION INTRAVENOUS; SUBCUTANEOUS at 01:06

## 2018-06-15 RX ADMIN — BUPROPION HYDROCHLORIDE 150 MG: 150 TABLET, EXTENDED RELEASE ORAL at 08:06

## 2018-06-15 RX ADMIN — Medication 400 MG: at 08:06

## 2018-06-15 RX ADMIN — ASPIRIN 81 MG CHEWABLE TABLET 81 MG: 81 TABLET CHEWABLE at 08:06

## 2018-06-15 RX ADMIN — IPRATROPIUM BROMIDE AND ALBUTEROL SULFATE 3 ML: .5; 3 SOLUTION RESPIRATORY (INHALATION) at 06:06

## 2018-06-15 RX ADMIN — POLYETHYLENE GLYCOL 3350 17 G: 17 POWDER, FOR SOLUTION ORAL at 08:06

## 2018-06-15 RX ADMIN — ATORVASTATIN CALCIUM 20 MG: 20 TABLET, FILM COATED ORAL at 08:06

## 2018-06-15 RX ADMIN — BACLOFEN 5 MG: 10 TABLET ORAL at 08:06

## 2018-06-15 RX ADMIN — BUSPIRONE HYDROCHLORIDE 5 MG: 5 TABLET ORAL at 08:06

## 2018-06-15 NOTE — PLAN OF CARE
Problem: Physical Therapy Goal  Goal: Physical Therapy Goal  Goals to be met by: 8 days     Patient will increase functional independence with mobility by performin. Supine to sit (HOB>30 degrees due to rib pain) with supervision. met  2. Sit to supine (HOB>30 degrees due to rib pain) with supervision. met  3. Sit to stand transfer with Supervision met  4. Bed to chair transfer with Supervision using Rolling Walker met  5. Gait  x 300 feet with Supervision using Rolling Walker with reciprocal gait pattern.   6. Ascend/descend 4 stairs with bilateral Handrails Stand-by to denote sufficient strength for stair negotiation in the community.  7. Ascend/Descend 4 inch curb step with Stand-by Assistance using Rolling Walker.met  8. Stand for 5 minutes with Supervision using Rolling Walker and/or UE support on stable surface while performing an activity. met  9. Lower extremity exercise program x 20 reps per handout, with independence. met  10. Pt will improve gait speed, to denote a lower fall risk and improvement in balance, from 0.34 m/s to 0.47m/s with use of a rolling walker. met         Outcome: Ongoing (interventions implemented as appropriate)  Goals remain appropriate, met 8/10 goals

## 2018-06-15 NOTE — NURSING
DISCHARGE INSTRUCTIONS GIVEN AND PATIENT UNDERSTANDS.PRESCRIPTION FOR HYDROCODONE GAVE TO PATIENT.DISCHARGED WITH HOME EQUIPMENT AND PERSONAL BELONGINGS ACCOMPANIED PCT TO LOBBY IN  WHEELCHAIR ACCOMPANIED BY SON TO HOME.

## 2018-06-15 NOTE — PLAN OF CARE
06/15/2018  2:37 PM    Patient to discharge home today with assist of family. Patient set up with [facility] per JOCY Irizarry.     Future Appointments  Date Time Provider Department Center   7/2/2018 9:00 AM Bereket Ballard MD [facility] STROKE [facility] include [facility] beds at the [facility]     Patient to follow up with Dr Burkett on 6/19/2018 at 11:45am.  Patient's discharge summary faxed t Dr Burkett's office at [phone].       06/15/18 1437   Final Note   Assessment Type Discharge Planning Assessment   Discharge Disposition Home   What phone number can be called within the next 1-3 days to see how you are doing after discharge? [phone]   Hospital Follow Up  Appt(s) scheduled? Yes   Discharge plans and expectations educations in teach back method with documentation complete? Yes     Christine Massey RN, CM Skilled  [id number]

## 2018-06-15 NOTE — PT/OT/SLP PROGRESS
Occupational Therapy  Treatment    Olimpia Cardoza   MRN: [id number]   Admitting Diagnosis: Embolic stroke involving right middle cerebral artery    OT Date of Treatment: 06/15/18  Total Time (min): 61 min    Billable Minutes:  Self Care/Home Management 30, Therapeutic Activity 15 and Therapeutic Exercise 16    General Precautions: Standard, aspiration, fall  Orthopedic Precautions: N/A  Braces: N/A         Subjective:  Communicated with nurse prior to session.      Pain/Comfort  Pain Rating 1: 7/10  Location - Side 1: Right  Location - Orientation 1: posterior  Location 1: rib(s)  Pain Addressed 1: Pre-medicate for activity, Reposition, Distraction  Pain Rating Post-Intervention 1: 7/10    Objective:  Patient found with: oxygen    Functional Status:  MDS G  Bed Mobility Functional Status: mod(I) - (I) (no assist)    Transfer Functional Status: S-SBA (SPT with RW and sit to standing transition)    Dressing Functional Status: S-SBA (Mod (I) with UBD, LBD (S) with Pt donning pants, socks and  slip on shoes.)    Eating Functional Status: mod(I) - (I) (no assist)    Toilet Use Functional Status: S-SBA (no physical assist but (S) for safety. )    Personal Hygiene Functional Status: mod(I) - (I) (seated.)    Bathing Functional Status: S-SBA (supervision.)      Moving from seated to standing position: Not steady, but able to stabilize without staff assistance  Walking (with assistive device if used): Not steady, but able to stabilize without staff assistance  Turning around and facing the opposite direction while walking: Not steady, but able to stabilize without staff assistance  Moving on and off the toilet: Not steady, but able to stabilize without staff assistance  Surface-to-surface transfer (transfer between bed and chair or wheelchair): Not steady, but able to stabilize without staff assistance     MDS GG  Eating Performance: Independent.  Oral Hygiene Performance: Independent.  Toileting Hygiene Performance: Setup or  clean-up assistance  Sit to lying Performance: Independent  Lying to sitting on side of bed Performance: Independent.  Lying to sitting on side of bed Goal: Setup or clean-up assistance  Sit to Stand Performance: Independent.  Chair/bed-to-chair transfer Performance: Setup or clean-up assistance  Toilet transfer Performance: Independent.     [facility] 6 Click:  [facility] Total Score: 22    OT Exercises: UE Ergometer performed 15 minutes with mod resistance.  UE exercises performed to increase functional endurance and strength.  Strengthening required in order increase independence when performing self care tasks, functional standing activities as well as when performing functional tasks.    Additional Treatment:  Pt worked on functional standing activity consisting of standing with RW  while reaching in all planes , crossing of midline and reaching to varying heights to facilitate (B) wt shifting and stability in standing to increase (I)ce when performing self care, and functional activities with standing component.  Pt tolerated up to     14 min 47 sec In standing with (S).    Patient left up in chair with all lines intact, call button in reach, nurse notified and son present    ASSESSMENT:  Olimpia Cardoza is a 67 y.o. female with a medical diagnosis of Embolic stroke involving right middle cerebral artery .  Pt has met established goals and would continue to benefit from OT intervention on HH basis.    Rehab identified problem list/impairments: weakness, impaired endurance, impaired self care skills, gait instability, impaired functional mobilty, impaired balance, pain    Rehab potential is good    Activity tolerance: Good    Discharge recommendations: home with home health     Barriers to discharge: None     Equipment recommendations: bedside commode     GOALS:    Occupational Therapy Goals        Problem: Occupational Therapy Goal    Goal Priority Disciplines Outcome Interventions   Occupational Therapy Goal     OT,  PT/OT Ongoing (interventions implemented as appropriate)    Description:  Goals to be met by: 8 days     Patient will increase functional independence with ADLs by performing:    UE Dressing with Modified [place]. Met  LE Dressing with Modified [place]. Not Met ( Supervision)  Grooming while standing at sink with Supervision.--met  Toileting from toilet with Modified [place] for hygiene and clothing management. --met  Bathing from  shower chair/bench with Supervision.  Supine to sit with Modified [place] /c HOB flat and no handrails.-met  Stand pivot transfers with Modified [place].--met  Toilet transfer to toilet with Modified [place].--met  Upper extremity exercise program x 15 reps per handout, with independence.--met  Patient will complete a functional dynamic standing activity x 10 min with S in order to complete a functional household task. --MET                         Plan:  Patient to be seen 5 x/week to address the above listed problems via self-care/home management, therapeutic activities, therapeutic exercises  Plan of Care expires: 07/10/18  Plan of Care reviewed with: patient, son    Levar ESPINOZA Ludwin, OTR/L  06/15/2018

## 2018-06-15 NOTE — PLAN OF CARE
Problem: Occupational Therapy Goal  Goal: Occupational Therapy Goal  Goals to be met by: 8 days     Patient will increase functional independence with ADLs by performing:    UE Dressing with Modified [place]. Met  LE Dressing with Modified [place]. Not Met ( Supervision)  Grooming while standing at sink with Supervision.--met  Toileting from toilet with Modified [place] for hygiene and clothing management. --met  Bathing from  shower chair/bench with Supervision.  Supine to sit with Modified [place] /c HOB flat and no handrails.-met  Stand pivot transfers with Modified [place].--met  Toilet transfer to toilet with Modified [place].--met  Upper extremity exercise program x 15 reps per handout, with independence.--met  Patient will complete a functional dynamic standing activity x 10 min with S in order to complete a functional household task. --MET       Outcome: Ongoing (interventions implemented as appropriate)  ZEKE Love/CONTRERAS      6/15/2018

## 2018-06-15 NOTE — NURSING
JAY BENJAMIN NP NOTIFIED THAT PATIENT SON REFUSES TO GO HOME TO GET MOTHER PORTABLE HOME OXYGEN.PULSE OXIMETER ON ROOM AIR 90%  TO 92%.

## 2018-06-15 NOTE — PLAN OF CARE
Problem: Patient Care Overview  Goal: Plan of Care Review  Outcome: Ongoing (interventions implemented as appropriate)   06/15/18 0034   Coping/Psychosocial   Plan Of Care Reviewed With patient       Problem: Fall Risk (Adult)  Goal: Identify Related Risk Factors and Signs and Symptoms  Related risk factors and signs and symptoms are identified upon initiation of Human Response Clinical Practice Guideline (CPG)   Outcome: Ongoing (interventions implemented as appropriate)   06/15/18 0034   Fall Risk   Related Risk Factors (Fall Risk) fatigue/slow reaction;fear of falling;gait/mobility problems

## 2018-06-15 NOTE — PLAN OF CARE
Problem: Patient Care Overview  Goal: Plan of Care Review  Outcome: Outcome(s) achieved Date Met: 06/15/18  BLOOD GLUCOSES MONITORED.FALL PRECAUTIONS MAINTAINED NO INJURIES NOTED.NO PRESSURE ULCERS NOTED.

## 2018-06-15 NOTE — PT/OT/SLP PROGRESS
"Physical Therapy  Treatment    Olimpia Cardoza   MRN: [id number]   Admitting Diagnosis: Embolic stroke involving right middle cerebral artery    PT Received On: 06/15/18  Total Time (min): 60       Billable Minutes:60    Gait Training 20, Therapeutic Activity 25 and Therapeutic Exercise 15    Treatment Type: Treatment  PT/PTA: PTA     PTA Visit Number: 5       General Precautions: Standard, aspiration, fall, respiratory  Orthopedic Precautions: N/A   Braces: N/A    Do you have any cultural, spiritual, Jain conflicts, given your current situation?: none    Subjective:  Pt sleeping upon arrival, easy to awaken, ready to jump up and get in WC, ed on fall risk, needing to fully awaken before getting up, son/pt agreeable  "I'm ready"    Pain/Comfort  Pain Rating 1: 10/10  Location - Side 1: Right  Location - Orientation 1: posterior  Location 1: rib(s)  Pain Addressed 1: Pre-medicate for activity, Reposition, Distraction, Cessation of Activity, Nurse notified, Other (see comments) (pt requested patch, nsg notified)  Pain Rating Post-Intervention 1: 10/10    Objective:   Patient found with: oxygen       Functional Status:  MDS G  Transfer Functional Status: S-SBA  Walk in Corridor Functional Status: S-SBA    MDS GG  Sit to Stand Performance: Setup or clean-up assistance  Chair/bed-to-chair transfer Performance: Setup or clean-up assistance  Does the resident walk?: Yes --> Continue to Walk 50 feet with two turns assessment  Walk 50 feet with two turns Performance: Supervision or touching assistance.  Walk 150 feet Performance: Supervision or touching assistance.     AM-PAC 6 CLICK MOBILITY  Total Score:18      Transfers:  Sit<>Stand: with RW S/mod I occ vcs for handplacement  Stand Pivot Transfer: S no AD BSC>WC<>nustep      Gait:  Amb with RW S 02 in tow 150 ft no LOB, 38 ft with RW S  x 2 trials seated rest break .85 m/s     Advanced Gait:  Curb Step: asc/brown 4" curb with RW SBA/S      Therex:  2x15 reps AP,LAQ,hip " flex    Additional Treatment:  nustep x 15 min L-1     Patient left up in chair with with OT.    Assessment:  Olimpia Cardoza is a 67 y.o. female with a medical diagnosis of Embolic stroke involving right middle cerebral artery.  Pt tolerated well, overall SBA/S with functional mobility, pt would continue to benefit from skilled PT services to improve overall functional mobility, strength and endurance.  .    Rehab identified problem list/impairments: impaired endurance, gait instability, impaired balance, pain, impaired functional mobilty    Rehab potential is good.    Activity tolerance: Fair    Discharge recommendations: home health PT     Barriers to discharge: None    Equipment recommendations: bedside commode     GOALS:    Physical Therapy Goals        Problem: Physical Therapy Goal    Goal Priority Disciplines Outcome Goal Variances Interventions   Physical Therapy Goal     PT/OT, PT Ongoing (interventions implemented as appropriate)     Description:  Goals to be met by: 8 days     Patient will increase functional independence with mobility by performin. Supine to sit (HOB>30 degrees due to rib pain) with supervision. met  2. Sit to supine (HOB>30 degrees due to rib pain) with supervision. met  3. Sit to stand transfer with Supervision met  4. Bed to chair transfer with Supervision using Rolling Walker met  5. Gait  x 300 feet with Supervision using Rolling Walker with reciprocal gait pattern.   6. Ascend/descend 4 stairs with bilateral Handrails Stand-by to denote sufficient strength for stair negotiation in the community.  7. Ascend/Descend 4 inch curb step with Stand-by Assistance using Rolling Walker.met  8. Stand for 5 minutes with Supervision using Rolling Walker and/or UE support on stable surface while performing an activity. met  9. Lower extremity exercise program x 20 reps per handout, with independence. met  10. Pt will improve gait speed, to denote a lower fall risk and improvement in  balance, from 0.34 m/s to 0.47m/s with use of a rolling walker. met                           PLAN:    Patient to be seen 5 x/week  to address the above listed problems via    Plan of Care expires: 07/10/18  Plan of Care reviewed with: patient, son (2 sons)    Ophelia Freire, PTA  06/15/2018

## 2018-06-15 NOTE — PLAN OF CARE
06/15/2018  10:19 AM    SW sent home health referral (via [facility] [facility]) to [facility] as pt denies having preference for HH placement.  JOCY notified by Terrie in intake at [facility] that the referral will be shared with the [facility] branch who will serve the pt.  Pt to have BSC delivered to SNF room by [facility] prior to d/c.  Pt to be accompanied and transported home by son upon SNF d/c today.    Juan C Marin, KLEBER  [id number]

## 2018-06-16 ENCOUNTER — HOSPITAL ENCOUNTER (INPATIENT)
Facility: HOSPITAL | Age: 68
LOS: 4 days | Discharge: HOME HEALTH | DRG: 871 | End: 2018-06-20
Attending: EMERGENCY MEDICINE | Admitting: INTERNAL MEDICINE
Payer: MEDICARE

## 2018-06-16 DIAGNOSIS — J18.9 PNEUMONIA OF RIGHT LUNG DUE TO INFECTIOUS ORGANISM, UNSPECIFIED PART OF LUNG: ICD-10-CM

## 2018-06-16 DIAGNOSIS — A41.9 SEPSIS, DUE TO UNSPECIFIED ORGANISM: ICD-10-CM

## 2018-06-16 DIAGNOSIS — R31.9 URINARY TRACT INFECTION WITH HEMATURIA, SITE UNSPECIFIED: ICD-10-CM

## 2018-06-16 DIAGNOSIS — N39.0 URINARY TRACT INFECTION WITH HEMATURIA, SITE UNSPECIFIED: ICD-10-CM

## 2018-06-16 DIAGNOSIS — R41.82 ALTERED MENTAL STATUS: ICD-10-CM

## 2018-06-16 DIAGNOSIS — G93.41 ENCEPHALOPATHY, METABOLIC: Primary | ICD-10-CM

## 2018-06-16 PROBLEM — Z99.11 ON MECHANICALLY ASSISTED VENTILATION: Status: ACTIVE | Noted: 2018-06-16

## 2018-06-16 PROBLEM — N17.9 AKI (ACUTE KIDNEY INJURY): Status: ACTIVE | Noted: 2018-06-16

## 2018-06-16 LAB
ALBUMIN SERPL BCP-MCNC: 2.1 G/DL
ALBUMIN SERPL BCP-MCNC: 2.4 G/DL
ALLENS TEST: ABNORMAL
ALLENS TEST: ABNORMAL
ALP SERPL-CCNC: 111 U/L
ALP SERPL-CCNC: 126 U/L
ALT SERPL W/O P-5'-P-CCNC: 17 U/L
ALT SERPL W/O P-5'-P-CCNC: 17 U/L
AMPHET+METHAMPHET UR QL: NEGATIVE
ANION GAP SERPL CALC-SCNC: 10 MMOL/L
ANION GAP SERPL CALC-SCNC: 16 MMOL/L
APTT BLDCRRT: 21.2 SEC
AST SERPL-CCNC: 19 U/L
AST SERPL-CCNC: 19 U/L
BACTERIA #/AREA URNS AUTO: ABNORMAL /HPF
BARBITURATES UR QL SCN>200 NG/ML: NEGATIVE
BASOPHILS # BLD AUTO: 0.01 K/UL
BASOPHILS NFR BLD: 0.1 %
BENZODIAZ UR QL SCN>200 NG/ML: NORMAL
BILIRUB SERPL-MCNC: 0.2 MG/DL
BILIRUB SERPL-MCNC: 0.2 MG/DL
BILIRUB UR QL STRIP: NEGATIVE
BNP SERPL-MCNC: 215 PG/ML
BUN SERPL-MCNC: 24 MG/DL
BUN SERPL-MCNC: 28 MG/DL
BZE UR QL SCN: NEGATIVE
CALCIUM SERPL-MCNC: 7.1 MG/DL
CALCIUM SERPL-MCNC: 7.9 MG/DL
CANNABINOIDS UR QL SCN: NEGATIVE
CHLORIDE SERPL-SCNC: 102 MMOL/L
CHLORIDE SERPL-SCNC: 106 MMOL/L
CLARITY UR REFRACT.AUTO: ABNORMAL
CO2 SERPL-SCNC: 24 MMOL/L
CO2 SERPL-SCNC: 28 MMOL/L
COLOR UR AUTO: YELLOW
CREAT SERPL-MCNC: 1.3 MG/DL
CREAT SERPL-MCNC: 1.5 MG/DL
CREAT UR-MCNC: 19 MG/DL
DELSYS: ABNORMAL
DIFFERENTIAL METHOD: ABNORMAL
EOSINOPHIL # BLD AUTO: 0 K/UL
EOSINOPHIL NFR BLD: 0.2 %
ERYTHROCYTE [DISTWIDTH] IN BLOOD BY AUTOMATED COUNT: 15.8 %
ERYTHROCYTE [SEDIMENTATION RATE] IN BLOOD BY WESTERGREN METHOD: 14 MM/H
EST. GFR  (AFRICAN AMERICAN): 41 ML/MIN/1.73 M^2
EST. GFR  (AFRICAN AMERICAN): 48.7 ML/MIN/1.73 M^2
EST. GFR  (NON AFRICAN AMERICAN): 35.5 ML/MIN/1.73 M^2
EST. GFR  (NON AFRICAN AMERICAN): 42.3 ML/MIN/1.73 M^2
ETHANOL UR-MCNC: <10 MG/DL
FIO2: 50
GLUCOSE SERPL-MCNC: 109 MG/DL
GLUCOSE SERPL-MCNC: 127 MG/DL
GLUCOSE UR QL STRIP: NEGATIVE
HCO3 UR-SCNC: 28.5 MMOL/L (ref 24–28)
HCO3 UR-SCNC: 30.8 MMOL/L (ref 24–28)
HCT VFR BLD AUTO: 25.8 %
HGB BLD-MCNC: 7.7 G/DL
HGB UR QL STRIP: ABNORMAL
IMM GRANULOCYTES # BLD AUTO: 0.19 K/UL
IMM GRANULOCYTES NFR BLD AUTO: 2.1 %
INR PPP: 1
KETONES UR QL STRIP: NEGATIVE
LACTATE SERPL-SCNC: 0.8 MMOL/L
LEUKOCYTE ESTERASE UR QL STRIP: ABNORMAL
LIPASE SERPL-CCNC: 22 U/L
LYMPHOCYTES # BLD AUTO: 1.2 K/UL
LYMPHOCYTES NFR BLD: 13.7 %
MAGNESIUM SERPL-MCNC: 1.6 MG/DL
MCH RBC QN AUTO: 27.3 PG
MCHC RBC AUTO-ENTMCNC: 29.8 G/DL
MCV RBC AUTO: 92 FL
METHADONE UR QL SCN>300 NG/ML: NEGATIVE
MICROSCOPIC COMMENT: ABNORMAL
MIN VOL: 6
MODE: ABNORMAL
MONOCYTES # BLD AUTO: 0.7 K/UL
MONOCYTES NFR BLD: 8 %
NEUTROPHILS # BLD AUTO: 6.8 K/UL
NEUTROPHILS NFR BLD: 75.9 %
NITRITE UR QL STRIP: NEGATIVE
NRBC BLD-RTO: 0 /100 WBC
OPIATES UR QL SCN: NEGATIVE
PCO2 BLDA: 39.3 MMHG (ref 35–45)
PCO2 BLDA: 42.6 MMHG (ref 35–45)
PCP UR QL SCN>25 NG/ML: NEGATIVE
PEEP: 5
PH SMN: 7.47 [PH] (ref 7.35–7.45)
PH SMN: 7.47 [PH] (ref 7.35–7.45)
PH UR STRIP: 7 [PH] (ref 5–8)
PHOSPHATE SERPL-MCNC: 4.1 MG/DL
PIP: 22
PLATELET # BLD AUTO: 322 K/UL
PMV BLD AUTO: 9.3 FL
PO2 BLDA: 109 MMHG (ref 80–100)
PO2 BLDA: 32 MMHG (ref 40–60)
POC BE: 5 MMOL/L
POC BE: 7 MMOL/L
POC SATURATED O2: 65 % (ref 95–100)
POC SATURATED O2: 99 % (ref 95–100)
POC TCO2: 30 MMOL/L (ref 24–29)
POC TCO2: 32 MMOL/L (ref 23–27)
POTASSIUM SERPL-SCNC: 4.4 MMOL/L
POTASSIUM SERPL-SCNC: 5 MMOL/L
PROCALCITONIN SERPL IA-MCNC: 0.13 NG/ML
PROT SERPL-MCNC: 5.5 G/DL
PROT SERPL-MCNC: 6 G/DL
PROT UR QL STRIP: NEGATIVE
PROTHROMBIN TIME: 10.5 SEC
RBC # BLD AUTO: 2.82 M/UL
RBC #/AREA URNS AUTO: 6 /HPF (ref 0–4)
SAMPLE: ABNORMAL
SAMPLE: ABNORMAL
SITE: ABNORMAL
SITE: ABNORMAL
SODIUM SERPL-SCNC: 140 MMOL/L
SODIUM SERPL-SCNC: 146 MMOL/L
SP GR UR STRIP: 1 (ref 1–1.03)
SP02: 100
TOXICOLOGY INFORMATION: NORMAL
TROPONIN I SERPL DL<=0.01 NG/ML-MCNC: 0.01 NG/ML
URN SPEC COLLECT METH UR: ABNORMAL
UROBILINOGEN UR STRIP-ACNC: NEGATIVE EU/DL
VT: 400
WBC # BLD AUTO: 8.9 K/UL
WBC #/AREA URNS AUTO: >100 /HPF (ref 0–5)
WBC CLUMPS UR QL AUTO: ABNORMAL

## 2018-06-16 PROCEDURE — 36600 WITHDRAWAL OF ARTERIAL BLOOD: CPT

## 2018-06-16 PROCEDURE — 85610 PROTHROMBIN TIME: CPT

## 2018-06-16 PROCEDURE — 87088 URINE BACTERIA CULTURE: CPT

## 2018-06-16 PROCEDURE — 63600175 PHARM REV CODE 636 W HCPCS: Performed by: STUDENT IN AN ORGANIZED HEALTH CARE EDUCATION/TRAINING PROGRAM

## 2018-06-16 PROCEDURE — 99900026 HC AIRWAY MAINTENANCE (STAT)

## 2018-06-16 PROCEDURE — 63600175 PHARM REV CODE 636 W HCPCS

## 2018-06-16 PROCEDURE — 82803 BLOOD GASES ANY COMBINATION: CPT

## 2018-06-16 PROCEDURE — 87077 CULTURE AEROBIC IDENTIFY: CPT

## 2018-06-16 PROCEDURE — 96367 TX/PROPH/DG ADDL SEQ IV INF: CPT | Mod: 59

## 2018-06-16 PROCEDURE — 83880 ASSAY OF NATRIURETIC PEPTIDE: CPT

## 2018-06-16 PROCEDURE — 99291 CRITICAL CARE FIRST HOUR: CPT | Mod: ,,, | Performed by: PHYSICIAN ASSISTANT

## 2018-06-16 PROCEDURE — 20000000 HC ICU ROOM

## 2018-06-16 PROCEDURE — 93010 ELECTROCARDIOGRAM REPORT: CPT | Mod: ,,, | Performed by: INTERNAL MEDICINE

## 2018-06-16 PROCEDURE — 83690 ASSAY OF LIPASE: CPT

## 2018-06-16 PROCEDURE — 87632 RESP VIRUS 6-11 TARGETS: CPT

## 2018-06-16 PROCEDURE — 99291 CRITICAL CARE FIRST HOUR: CPT | Mod: 25

## 2018-06-16 PROCEDURE — 84100 ASSAY OF PHOSPHORUS: CPT

## 2018-06-16 PROCEDURE — 96366 THER/PROPH/DIAG IV INF ADDON: CPT

## 2018-06-16 PROCEDURE — 94002 VENT MGMT INPAT INIT DAY: CPT

## 2018-06-16 PROCEDURE — 99900035 HC TECH TIME PER 15 MIN (STAT)

## 2018-06-16 PROCEDURE — 87070 CULTURE OTHR SPECIMN AEROBIC: CPT

## 2018-06-16 PROCEDURE — 87186 SC STD MICRODIL/AGAR DIL: CPT

## 2018-06-16 PROCEDURE — 94761 N-INVAS EAR/PLS OXIMETRY MLT: CPT

## 2018-06-16 PROCEDURE — 80053 COMPREHEN METABOLIC PANEL: CPT | Mod: 91

## 2018-06-16 PROCEDURE — 85025 COMPLETE CBC W/AUTO DIFF WBC: CPT

## 2018-06-16 PROCEDURE — 83735 ASSAY OF MAGNESIUM: CPT

## 2018-06-16 PROCEDURE — 25000003 PHARM REV CODE 250: Performed by: STUDENT IN AN ORGANIZED HEALTH CARE EDUCATION/TRAINING PROGRAM

## 2018-06-16 PROCEDURE — 93005 ELECTROCARDIOGRAM TRACING: CPT

## 2018-06-16 PROCEDURE — 87086 URINE CULTURE/COLONY COUNT: CPT

## 2018-06-16 PROCEDURE — 84484 ASSAY OF TROPONIN QUANT: CPT

## 2018-06-16 PROCEDURE — 81001 URINALYSIS AUTO W/SCOPE: CPT

## 2018-06-16 PROCEDURE — 96365 THER/PROPH/DIAG IV INF INIT: CPT | Mod: 59

## 2018-06-16 PROCEDURE — 5A1935Z RESPIRATORY VENTILATION, LESS THAN 24 CONSECUTIVE HOURS: ICD-10-PCS | Performed by: INTERNAL MEDICINE

## 2018-06-16 PROCEDURE — 84145 PROCALCITONIN (PCT): CPT

## 2018-06-16 PROCEDURE — 85730 THROMBOPLASTIN TIME PARTIAL: CPT

## 2018-06-16 PROCEDURE — 27000221 HC OXYGEN, UP TO 24 HOURS

## 2018-06-16 PROCEDURE — P9612 CATHETERIZE FOR URINE SPEC: HCPCS

## 2018-06-16 PROCEDURE — 83605 ASSAY OF LACTIC ACID: CPT

## 2018-06-16 PROCEDURE — 87040 BLOOD CULTURE FOR BACTERIA: CPT

## 2018-06-16 PROCEDURE — 80053 COMPREHEN METABOLIC PANEL: CPT

## 2018-06-16 PROCEDURE — 63600175 PHARM REV CODE 636 W HCPCS: Performed by: EMERGENCY MEDICINE

## 2018-06-16 PROCEDURE — 80307 DRUG TEST PRSMV CHEM ANLYZR: CPT

## 2018-06-16 PROCEDURE — 87205 SMEAR GRAM STAIN: CPT

## 2018-06-16 RX ORDER — NAPROXEN SODIUM 220 MG/1
81 TABLET, FILM COATED ORAL NIGHTLY
Status: DISCONTINUED | OUTPATIENT
Start: 2018-06-16 | End: 2018-06-16

## 2018-06-16 RX ORDER — PROPOFOL 10 MG/ML
INJECTION, EMULSION INTRAVENOUS
Status: DISPENSED
Start: 2018-06-16 | End: 2018-06-17

## 2018-06-16 RX ORDER — FLUTICASONE FUROATE AND VILANTEROL 100; 25 UG/1; UG/1
1 POWDER RESPIRATORY (INHALATION) DAILY
Status: DISCONTINUED | OUTPATIENT
Start: 2018-06-17 | End: 2018-06-16

## 2018-06-16 RX ORDER — NAPROXEN SODIUM 220 MG/1
81 TABLET, FILM COATED ORAL NIGHTLY
Status: DISCONTINUED | OUTPATIENT
Start: 2018-06-16 | End: 2018-06-20 | Stop reason: HOSPADM

## 2018-06-16 RX ORDER — FENTANYL CITRATE 50 UG/ML
50 INJECTION, SOLUTION INTRAMUSCULAR; INTRAVENOUS
Status: DISCONTINUED | OUTPATIENT
Start: 2018-06-16 | End: 2018-06-17

## 2018-06-16 RX ORDER — DEXMEDETOMIDINE HYDROCHLORIDE 4 UG/ML
0.2 INJECTION, SOLUTION INTRAVENOUS CONTINUOUS
Status: DISCONTINUED | OUTPATIENT
Start: 2018-06-16 | End: 2018-06-17

## 2018-06-16 RX ORDER — TIOTROPIUM BROMIDE 18 UG/1
18 CAPSULE ORAL; RESPIRATORY (INHALATION) DAILY
Status: DISCONTINUED | OUTPATIENT
Start: 2018-06-17 | End: 2018-06-16

## 2018-06-16 RX ORDER — HEPARIN SODIUM 5000 [USP'U]/ML
5000 INJECTION, SOLUTION INTRAVENOUS; SUBCUTANEOUS EVERY 8 HOURS
Status: DISCONTINUED | OUTPATIENT
Start: 2018-06-16 | End: 2018-06-20 | Stop reason: HOSPADM

## 2018-06-16 RX ORDER — CHLORHEXIDINE GLUCONATE ORAL RINSE 1.2 MG/ML
15 SOLUTION DENTAL 2 TIMES DAILY
Status: DISCONTINUED | OUTPATIENT
Start: 2018-06-16 | End: 2018-06-17

## 2018-06-16 RX ORDER — POTASSIUM CHLORIDE 750 MG/1
10 TABLET, EXTENDED RELEASE ORAL 2 TIMES DAILY
COMMUNITY

## 2018-06-16 RX ORDER — SODIUM CHLORIDE 0.9 % (FLUSH) 0.9 %
3 SYRINGE (ML) INJECTION
Status: DISCONTINUED | OUTPATIENT
Start: 2018-06-16 | End: 2018-06-20 | Stop reason: HOSPADM

## 2018-06-16 RX ORDER — PROPOFOL 10 MG/ML
30 VIAL (ML) INTRAVENOUS ONCE
Status: DISCONTINUED | OUTPATIENT
Start: 2018-06-16 | End: 2018-06-16

## 2018-06-16 RX ORDER — GLUCAGON 1 MG
1 KIT INJECTION
Status: DISCONTINUED | OUTPATIENT
Start: 2018-06-16 | End: 2018-06-20

## 2018-06-16 RX ORDER — INSULIN ASPART 100 [IU]/ML
0-5 INJECTION, SOLUTION INTRAVENOUS; SUBCUTANEOUS EVERY 6 HOURS PRN
Status: DISCONTINUED | OUTPATIENT
Start: 2018-06-16 | End: 2018-06-20

## 2018-06-16 RX ORDER — PROPOFOL 10 MG/ML
20 INJECTION, EMULSION INTRAVENOUS
Status: COMPLETED | OUTPATIENT
Start: 2018-06-16 | End: 2018-06-16

## 2018-06-16 RX ORDER — FAMOTIDINE 10 MG/ML
20 INJECTION INTRAVENOUS DAILY
Status: DISCONTINUED | OUTPATIENT
Start: 2018-06-17 | End: 2018-06-17

## 2018-06-16 RX ORDER — FENTANYL CITRATE-0.9 % NACL/PF 10 MCG/ML
25 PLASTIC BAG, INJECTION (ML) INTRAVENOUS CONTINUOUS
Status: DISCONTINUED | OUTPATIENT
Start: 2018-06-17 | End: 2018-06-17

## 2018-06-16 RX ORDER — BUSPIRONE HYDROCHLORIDE 15 MG/1
15 TABLET ORAL 2 TIMES DAILY
COMMUNITY

## 2018-06-16 RX ORDER — PROPOFOL 10 MG/ML
5 INJECTION, EMULSION INTRAVENOUS CONTINUOUS
Status: DISCONTINUED | OUTPATIENT
Start: 2018-06-16 | End: 2018-06-16

## 2018-06-16 RX ORDER — IPRATROPIUM BROMIDE 0.5 MG/2.5ML
500 SOLUTION RESPIRATORY (INHALATION) 4 TIMES DAILY
Status: ON HOLD | COMMUNITY
End: 2018-06-20 | Stop reason: HOSPADM

## 2018-06-16 RX ORDER — MELOXICAM 7.5 MG/1
7.5 TABLET ORAL DAILY
COMMUNITY

## 2018-06-16 RX ADMIN — DEXMEDETOMIDINE HYDROCHLORIDE 1.2 MCG/KG/HR: 4 INJECTION, SOLUTION INTRAVENOUS at 09:06

## 2018-06-16 RX ADMIN — PROPOFOL 30 MCG/KG/MIN: 10 INJECTION, EMULSION INTRAVENOUS at 12:06

## 2018-06-16 RX ADMIN — VANCOMYCIN HYDROCHLORIDE 1000 MG: 1 INJECTION, POWDER, LYOPHILIZED, FOR SOLUTION INTRAVENOUS at 05:06

## 2018-06-16 RX ADMIN — Medication 50 MCG/HR: at 11:06

## 2018-06-16 RX ADMIN — HEPARIN SODIUM 5000 UNITS: 5000 INJECTION, SOLUTION INTRAVENOUS; SUBCUTANEOUS at 10:06

## 2018-06-16 RX ADMIN — CHLORHEXIDINE GLUCONATE 0.12% ORAL RINSE 15 ML: 1.2 LIQUID ORAL at 10:06

## 2018-06-16 RX ADMIN — PROPOFOL 40 MCG/KG/MIN: 10 INJECTION, EMULSION INTRAVENOUS at 04:06

## 2018-06-16 RX ADMIN — PIPERACILLIN AND TAZOBACTAM 4.5 G: 4; .5 INJECTION, POWDER, LYOPHILIZED, FOR SOLUTION INTRAVENOUS; PARENTERAL at 03:06

## 2018-06-16 RX ADMIN — PIPERACILLIN AND TAZOBACTAM 4.5 G: 4; .5 INJECTION, POWDER, LYOPHILIZED, FOR SOLUTION INTRAVENOUS; PARENTERAL at 11:06

## 2018-06-16 RX ADMIN — DEXMEDETOMIDINE HYDROCHLORIDE 0.2 MCG/KG/HR: 4 INJECTION, SOLUTION INTRAVENOUS at 05:06

## 2018-06-16 RX ADMIN — ASPIRIN 81 MG CHEWABLE TABLET 81 MG: 81 TABLET CHEWABLE at 10:06

## 2018-06-16 NOTE — H&P
"[facility]  Critical Care Medicine  History & Physical    Patient Name: Olimpia Cardoza  MRN: [id number]  Admission Date: 6/16/2018  Hospital Length of Stay: 0 days  Code Status: Full Code  Attending Physician: Abhijeet Babcock MD   Primary Care Provider: Shad Burkett MD   Principal Problem: Encephalopathy, metabolic    Subjective:     HPI:  67 y.o. CF with PMH of HTN, HLD, COPD, type 2 DM,  H/o of thyroidectomy and most recently CVA presents via EMS 2/2 to new onset altered mental status.On 6/5/18 patient had been in her usual state of health where she fell, broke several ribs, and then became altered.  Patient suffered a right MCA stroke, treated with TPA at [facility], and admitted to Neuro ICU on 6/6/18. She was subsequently discharge to a rehab facility where she completed rehab. Patient discharged from rehab yesterday. At that time patient was ambulating with assistance, talking, and eating. Last time patient was seen conscious was 10pm last night. Early the morning she was found to be confused and slurring her words. Her mentation detiorated and then she became unresponsive.   Found to have possible RLL pneumonia and UTI with a concern for sepsis. Patient was given Rocephin, azithromycin and gentamicin prior to arrival to [facility] ED. At OSH patient was intubated for worsening status. Per son, while patient was in rehab she was noted to be coughing up "phglem". He also states that he noted blood in her martell bag but "they didn't do anything about it". He states that besides her cough she denied any fever, chills, nausea, vomiting, CP, SOB, hematuria, dysuria. He does state that his mother has been constipated.     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 " Lactate 0.8, HERNAN noted with Cr 1.5, and dirty UA. CXR showed "There is opacification of the right lung base suggesting a layering pleural effusion with associated atelectasis and/or consolidation. " Patient is intubated and currently on propofol drip. MICU consulted 2/2 to concerns of new AMS and sepsis. Patient has full code status.     History provided by chart review and by talking to son, Patric, at bedside.    Hospital/ICU Course:  06/16/2018 Admitted to MICU for concerns of new onset AMS and sepsis. CXR shows possible consolidation. Dirty UA noted. Patient started on vancomycin and zosyn for broad spectrum coverage. Will attempt to wean off sedation and assess mental status. Urine culture pending, blood culture pending.      Past Medical History:   Diagnosis Date    A-fib 6/7/2018    Chronic congestive heart failure 6/10/2018    CVA (cerebral vascular accident) 6/4/2018    Depression 2/24/2015    Diabetes mellitus     Emphysema 2/24/2015    Hx smoking; home oxygen 2 l for past 4 years    Encounter for blood transfusion     Hepatomegaly 2/24/2015    Found on Routine imaging    HTN (hypertension) 2/24/2015    Hyperlipidemia     Other specified hypothyroidism 6/5/2018    Sleep apnea 2/24/2015       Past Surgical History:   Procedure Laterality Date    CARDIAC CATHETERIZATION      CHOLECYSTECTOMY      dislocated hip      traction    HEMORRHOID SURGERY      HERNIA REPAIR      with mesh    NISSEN FUNDOPLICATION      THYROID SURGERY         Review of patient's allergies indicates:   Allergen Reactions    Codeine Palpitations       Family History     Problem Relation (Age of Onset)    Coronary artery disease Father, Brother    Emphysema Mother    Lung cancer Mother    No Known Problems Sister, Sister, Sister, Son, Son, Son, Maternal Grandmother, Maternal Grandfather, Paternal Grandmother, Paternal Grandfather, Maternal Aunt, Maternal " Uncle, Paternal Aunt, Paternal Uncle    SIDS Daughter    Stroke Brother        Social History Main Topics    Smoking status: Former Smoker     Packs/day: 3.00     Years: 47.00     Quit date: 3/21/2012    Smokeless tobacco: Not on file    Alcohol use No    Drug use: No    Sexual activity: Not on file      Review of Systems   Unable to perform ROS: Intubated     Objective:     Vital Signs (Most Recent):  Temp: 98.7 °F (37.1 °C) (06/16/18 1226)  Pulse: 80 (06/16/18 1531)  Resp: 20 (06/16/18 1320)  BP: 128/60 (06/16/18 1531)  SpO2: 97 % (06/16/18 1531) Vital Signs (24h Range):  Temp:  [96.7 °F (35.9 °C)-98.7 °F (37.1 °C)] 98.7 °F (37.1 °C)  Pulse:  [65-82] 80  Resp:  [14-20] 20  SpO2:  [97 %-100 %] 97 %  BP: (109-136)/(52-69) 128/60   Weight: 72.6 kg (160 lb)  Body mass index is 30.23 kg/m².      Intake/Output Summary (Last 24 hours) at 06/16/18 1606  Last data filed at 06/16/18 1600   Gross per 24 hour   Intake            47.13 ml   Output              300 ml   Net          -252.87 ml       Physical Exam   Constitutional: She appears well-developed and well-nourished.   Resting in bed, intubated, currently not fighting ET tube    HENT:   Head: Normocephalic and atraumatic.   ET tube in place, thyroid scar noted    Eyes: Pupils are equal, round, and reactive to light.   Cardiovascular: Normal rate and regular rhythm.    Murmur heard.  Pulmonary/Chest:   On mechanical ventilation. Right lower quadrant rhonchi heard    Abdominal: Soft. Bowel sounds are normal.   brusing noted   Large old horizontal scar noted    Genitourinary:   Genitourinary Comments: Fong catheter in place   Neurological:   Intubated. Sedated.    Skin:   No skin breakdown seen on upper or lower extremities bilaterally    Vitals reviewed.      Vents:  Vent Mode: A/C (06/16/18 1500)  Ventilator Initiated: Yes (06/16/18 1218)  Set Rate: 14 bmp (06/16/18 1500)  Vt Set: 380 mL (06/16/18 1500)  Pressure Support: 0 cmH20 (06/16/18 1500)  PEEP/CPAP: 5  cmH20 (06/16/18 1500)  Oxygen Concentration (%): 40 (06/16/18 1515)  Peak Airway Pressure: 25 cmH2O (06/16/18 1500)  Plateau Pressure: 0 cmH20 (06/16/18 1500)  Total Ve: 6.9 mL (06/16/18 1500)  Lines/Drains/Airways     Drain                 NG/OG Tube 06/16/18 1219 16 Fr. Left nostril less than 1 day         Urethral Catheter 06/16/18 1218 less than 1 day          Airway                 Airway - Non-Surgical 06/16/18 1220 less than 1 day          Peripheral Intravenous Line                 Peripheral IV - Single Lumen 06/16/18 1217 Right Antecubital less than 1 day         Peripheral IV - Single Lumen 06/16/18 1218 Left Antecubital less than 1 day         Peripheral IV - Single Lumen 06/16/18 1601 Right Hand less than 1 day              Significant Labs:    CBC/Anemia Profile:    Recent Labs  Lab 06/16/18  1221   WBC 8.90   HGB 7.7*   HCT 25.8*      MCV 92   RDW 15.8*        Chemistries:    Recent Labs  Lab 06/16/18  1221      K 5.0      CO2 28   BUN 28*   CREATININE 1.5*   CALCIUM 7.9*   ALBUMIN 2.4*   PROT 6.0   BILITOT 0.2   ALKPHOS 126   ALT 17   AST 19   MG 1.6       None    Significant Imaging: I have reviewed all pertinent imaging results/findings within the past 24 hours.  see HPI    Assessment/Plan:     Neuro   * Encephalopathy, metabolic    - unclear etiology  - differential diagnosis includes infection in the setting of possible pneumonia and UTI, polypharmacy as patient has multiple medications she is taking, or potentially new onset neurological cause, dehydration and HERNAN from recently being discharged from hospital.   - MRI/ MRA head showed no new acute neurological findings  - consider EEG if mentation does not improve  - no history of cirrhosis or alcohol abuse  - patient currently intubated and sedated  - will wean sedation and attempt to extubate patient to assess neurological status   - blood culture, urine culture, and respiratory culture pending         Right MCA ischemic  stroke, aborted by t-PA    - s/p Neuro ICU stay  - discharged home from rehab on 6/15/18  - no acute changes noted on MRI/MRA        Psychiatric   Depression    - son cannot answer which medications patient taking or if she took medications prior to arrival    - polypharmacy noted  - multiple drugs on med rec included : alprazolam, bupropion, escitalopram, and seroquel   - non ordered while inpatient         Pulmonary   COPD (chronic obstructive pulmonary disease)    - chronic COPD   - continue albuterol and breo treatment while inpatient           Cardiac/Vascular   Essential hypertension    - BP: (109-136)/(52-69) 113/59  - lopressor 25mg daily  - hold home meds while inpatient, currently normotensive        Renal/   Urinary tract infection with hematuria    - dirty UA on admission  - urine culture pending  - on vancomycin and zosyn for broad spectrum coverage         HERNAN (acute kidney injury)    - Cr 1.5, baseline 1.1  - BMP q6 hours         Endocrine   Other specified hypothyroidism    - history of thyroidectomy  - home synthroid 175mg daily, held while inpatient         Type 2 diabetes mellitus, without long-term current use of insulin    - glucose 122  - NPO while inpatient  - SSI while inpatient  - Home medications include glimeiperide, metformin             Critical Care Daily Checklist:    A: Awake: RASS Goal/Actual Goal:    Actual:     B: Spontaneous Breathing Trial Performed?     C: SAT & SBT Coordinated?  n/a                      D: Delirium: CAM-ICU     E: Early Mobility Performed? No   F: Feeding Goal:    Status:     Current Diet Order   Procedures    Diet NPO      AS: Analgesia/Sedation Propofol, will wean    T: Thromboembolic Prophylaxis no   H: HOB > 300 Yes   U: Stress Ulcer Prophylaxis (if needed) no   G: Glucose Control SSI   B: Bowel Function     I: Indwelling Catheter (Lines & Fong) Necessity Fong, ET tube    D: De-escalation of Antimicrobials/Pharmacotherapies Continue broad spectrum  coverage     Plan for the day/ETD Wean off sedation     Code Status:  Family/Goals of Care: Full Code          Anjana De La Paz MD  Critical Care Medicine  [facility]

## 2018-06-16 NOTE — ASSESSMENT & PLAN NOTE
- glucose 122  - NPO while inpatient  - SSI while inpatient  - Home medications include glimeiperide, metformin

## 2018-06-16 NOTE — ED TRIAGE NOTES
PT transferred via [facility] from our [facility]. Pt was d/c [facility] last week from neuro critical care and was TPA with stroke. Pt presented to out [facility] today was found by family unresponsive. PT was diagnosed with sepsis and pneumonia at [facility]. Pt arrived with martell catheter in place, 18 g RAC and 20 LAC. PT arrived intubated ETT Tube size 7.5 23 @ lip and 16f nG tube. Pt received 1 g rocephin, 500zithromax, and 50mg gent at our [facility]. Pt arrived with propofol infusing 30 mcg/kg.

## 2018-06-16 NOTE — HOSPITAL COURSE
06/16/2018 Admitted to MICU for concerns of new onset AMS and sepsis. CXR shows possible consolidation. Dirty UA noted. Patient started on vancomycin and zosyn for broad spectrum coverage. Will attempt to wean off sedation and assess mental status. Urine culture pending, blood culture pending.   06/17/2018 NOAEON. Will wean patient off vent today and attempt to self extubate. Continue abx coverage. BP has remained stable. Resp culture growing gram pos and gram neg rods. Urine culture pending.

## 2018-06-16 NOTE — SUBJECTIVE & OBJECTIVE
Past Medical History:   Diagnosis Date    A-fib 6/7/2018    Chronic congestive heart failure 6/10/2018    CVA (cerebral vascular accident) 6/4/2018    Depression 2/24/2015    Diabetes mellitus     Emphysema 2/24/2015    Hx smoking; home oxygen 2 l for past 4 years    Encounter for blood transfusion     Hepatomegaly 2/24/2015    Found on Routine imaging    HTN (hypertension) 2/24/2015    Hyperlipidemia     Other specified hypothyroidism 6/5/2018    Sleep apnea 2/24/2015       Past Surgical History:   Procedure Laterality Date    CARDIAC CATHETERIZATION      CHOLECYSTECTOMY      dislocated hip      traction    HEMORRHOID SURGERY      HERNIA REPAIR      with mesh    NISSEN FUNDOPLICATION      THYROID SURGERY         Review of patient's allergies indicates:   Allergen Reactions    Codeine Palpitations       Family History     Problem Relation (Age of Onset)    Coronary artery disease Father, Brother    Emphysema Mother    Lung cancer Mother    No Known Problems Sister, Sister, Sister, Son, Son, Son, Maternal Grandmother, Maternal Grandfather, Paternal Grandmother, Paternal Grandfather, Maternal Aunt, Maternal Uncle, Paternal Aunt, Paternal Uncle    SIDS Daughter    Stroke Brother        Social History Main Topics    Smoking status: Former Smoker     Packs/day: 3.00     Years: 47.00     Quit date: 3/21/2012    Smokeless tobacco: Not on file    Alcohol use No    Drug use: No    Sexual activity: Not on file      Review of Systems   Unable to perform ROS: Intubated     Objective:     Vital Signs (Most Recent):  Temp: 98.7 °F (37.1 °C) (06/16/18 1226)  Pulse: 80 (06/16/18 1531)  Resp: 20 (06/16/18 1320)  BP: 128/60 (06/16/18 1531)  SpO2: 97 % (06/16/18 1531) Vital Signs (24h Range):  Temp:  [96.7 °F (35.9 °C)-98.7 °F (37.1 °C)] 98.7 °F (37.1 °C)  Pulse:  [65-82] 80  Resp:  [14-20] 20  SpO2:  [97 %-100 %] 97 %  BP: (109-136)/(52-69) 128/60   Weight: 72.6 kg (160 lb)  Body mass index is 30.23  kg/m².      Intake/Output Summary (Last 24 hours) at 06/16/18 1606  Last data filed at 06/16/18 1600   Gross per 24 hour   Intake            47.13 ml   Output              300 ml   Net          -252.87 ml       Physical Exam   Constitutional: She appears well-developed and well-nourished.   Resting in bed, intubated, currently not fighting ET tube    HENT:   Head: Normocephalic and atraumatic.   ET tube in place, thyroid scar noted    Eyes: Pupils are equal, round, and reactive to light.   Cardiovascular: Normal rate and regular rhythm.    Murmur heard.  Pulmonary/Chest:   On mechanical ventilation. Right lower quadrant rhonchi heard    Abdominal: Soft. Bowel sounds are normal.   brusing noted   Large old horizontal scar noted    Genitourinary:   Genitourinary Comments: Fong catheter in place   Neurological:   Intubated. Sedated.    Skin:   No skin breakdown seen on upper or lower extremities bilaterally    Vitals reviewed.      Vents:  Vent Mode: A/C (06/16/18 1500)  Ventilator Initiated: Yes (06/16/18 1218)  Set Rate: 14 bmp (06/16/18 1500)  Vt Set: 380 mL (06/16/18 1500)  Pressure Support: 0 cmH20 (06/16/18 1500)  PEEP/CPAP: 5 cmH20 (06/16/18 1500)  Oxygen Concentration (%): 40 (06/16/18 1515)  Peak Airway Pressure: 25 cmH2O (06/16/18 1500)  Plateau Pressure: 0 cmH20 (06/16/18 1500)  Total Ve: 6.9 mL (06/16/18 1500)  Lines/Drains/Airways     Drain                 NG/OG Tube 06/16/18 1219 16 Fr. Left nostril less than 1 day         Urethral Catheter 06/16/18 1218 less than 1 day          Airway                 Airway - Non-Surgical 06/16/18 1220 less than 1 day          Peripheral Intravenous Line                 Peripheral IV - Single Lumen 06/16/18 1217 Right Antecubital less than 1 day         Peripheral IV - Single Lumen 06/16/18 1218 Left Antecubital less than 1 day         Peripheral IV - Single Lumen 06/16/18 1601 Right Hand less than 1 day              Significant Labs:    CBC/Anemia Profile:    Recent  Labs  Lab 06/16/18  1221   WBC 8.90   HGB 7.7*   HCT 25.8*      MCV 92   RDW 15.8*        Chemistries:    Recent Labs  Lab 06/16/18  1221      K 5.0      CO2 28   BUN 28*   CREATININE 1.5*   CALCIUM 7.9*   ALBUMIN 2.4*   PROT 6.0   BILITOT 0.2   ALKPHOS 126   ALT 17   AST 19   MG 1.6       None    Significant Imaging: I have reviewed all pertinent imaging results/findings within the past 24 hours.  see HPI

## 2018-06-16 NOTE — ASSESSMENT & PLAN NOTE
- BP: (109-136)/(52-69) 113/59  - lopressor 25mg daily  - hold home meds while inpatient, currently normotensive

## 2018-06-16 NOTE — ASSESSMENT & PLAN NOTE
- dirty UA on admission  - urine culture pending  - on vancomycin and zosyn for broad spectrum coverage

## 2018-06-16 NOTE — ED NOTES
Continuous cardiac, BP, and O2 monitoring in progress. VS being monitoring continuously per MD orders. Bed in low, locked position with rails up.Will continue to monitor.

## 2018-06-16 NOTE — ED NOTES
Family arrived at bedside, updated on POC, MD called to bedside and also updated family member on POC.

## 2018-06-16 NOTE — ASSESSMENT & PLAN NOTE
- unclear etiology  - differential diagnosis includes infection in the setting of possible pneumonia and UTI, polypharmacy as patient has multiple medications she is taking, or potentially new onset neurological cause, dehydration and HERNAN from recently being discharged from hospital.   - MRI/ MRA head showed no new acute neurological findings  - consider EEG if mentation does not improve  - no history of cirrhosis or alcohol abuse  - patient currently intubated and sedated  - will wean sedation and attempt to extubate patient to assess neurological status   - blood culture, urine culture, and respiratory culture pending

## 2018-06-16 NOTE — CONSULTS
Patient admitted to MICU.  Please see H&P for further detail.      Stefan Vincent M.D.   PGY-2  Pager [phone]

## 2018-06-16 NOTE — ED PROVIDER NOTES
Encounter Date: 6/16/2018    SCRIBE #1 NOTE: I, Oksana Marsh, am scribing for, and in the presence of,  Dr. Babcock. I have scribed the following portions of the note - Other sections scribed: the Disposition.       History     Chief Complaint   Patient presents with    Altered Mental Status     [facility] out recurrent CVA; also possible pneumonia; currently intubated.     Patient is a 67 year old female with a history of HTN, Afib, CVA, presenting to the ED for evaluation of altered mental status. Patient transferred from [facility]. Patient arrived intubated to our faciltiy. Apparently was give TPA 10 days ago for stroke like symptoms. Came into previous facility unresponsive and agitated.  Found to have possible RLL pneumonia and UTI.  Concerned for sepsis today. Was given Rocephin, azithromycin and gentamicin prior to arrival.       The history is provided by medical records.     Review of patient's allergies indicates:   Allergen Reactions    Codeine Palpitations     Past Medical History:   Diagnosis Date    A-fib 6/7/2018    Chronic congestive heart failure 6/10/2018    CVA (cerebral vascular accident) 6/4/2018    Depression 2/24/2015    Diabetes mellitus     Emphysema 2/24/2015    Hx smoking; home oxygen 2 l for past 4 years    Encounter for blood transfusion     Hepatomegaly 2/24/2015    Found on Routine imaging    HTN (hypertension) 2/24/2015    Hyperlipidemia     Other specified hypothyroidism 6/5/2018    Sleep apnea 2/24/2015     Past Surgical History:   Procedure Laterality Date    CARDIAC CATHETERIZATION      CHOLECYSTECTOMY      dislocated hip      traction    HEMORRHOID SURGERY      HERNIA REPAIR      with mesh    NISSEN FUNDOPLICATION      THYROID SURGERY       Family History   Problem Relation Age of Onset    Emphysema Mother     Lung cancer Mother     Coronary artery disease Father     Stroke Brother     Coronary artery disease  Brother     SIDS Daughter     No Known Problems Sister     No Known Problems Sister     No Known Problems Sister     No Known Problems Son     No Known Problems Son     No Known Problems Son     No Known Problems Maternal Grandmother     No Known Problems Maternal Grandfather     No Known Problems Paternal Grandmother     No Known Problems Paternal Grandfather     No Known Problems Maternal Aunt     No Known Problems Maternal Uncle     No Known Problems Paternal Aunt     No Known Problems Paternal Uncle     Anemia Neg Hx     Arrhythmia Neg Hx     Asthma Neg Hx     Clotting disorder Neg Hx     Fainting Neg Hx     Heart attack Neg Hx     Heart disease Neg Hx     Heart failure Neg Hx     Hyperlipidemia Neg Hx     Hypertension Neg Hx     Atrial Septal Defect Neg Hx      Social History   Substance Use Topics    Smoking status: Former Smoker     Packs/day: 3.00     Years: 47.00     Quit date: 3/21/2012    Smokeless tobacco: Not on file    Alcohol use No     Review of Systems   Unable to perform ROS: Intubated       Physical Exam     Initial Vitals   BP Pulse Resp Temp SpO2   06/16/18 1211 06/16/18 1211 06/16/18 1211 06/16/18 1226 06/16/18 1211   118/69 74 14 98.7 °F (37.1 °C) 99 %      MAP       --                Physical Exam    Constitutional: She appears well-developed and well-nourished. She is intubated.   HENT:   Head: Normocephalic and atraumatic.   Eyes: Pupils are equal, round, and reactive to light.   Cardiovascular: Normal rate, regular rhythm, normal heart sounds and intact distal pulses.   Pulmonary/Chest: She is intubated. She has decreased breath sounds.   Abdominal: Soft.   Neurological: She is unresponsive.   Skin: Skin is warm.         ED Course   Critical Care  Date/Time: 6/16/2018 3:11 PM  Performed by: NEVA SANDERS  Authorized by: KRISHNA HOLGUIN   Direct patient critical care time: 5 minutes  Additional history critical care time: 5 minutes  Ordering /  reviewing critical care time: 10 minutes  Documentation critical care time: 5 minutes  Consulting other physicians critical care time: 10 minutes  Total critical care time (exclusive of procedural time) : 35 minutes  Critical care was necessary to treat or prevent imminent or life-threatening deterioration of the following conditions: respiratory failure and sepsis.  Critical care was time spent personally by me on the following activities: discussions with consultants, examination of patient, obtaining history from patient or surrogate, ordering and performing treatments and interventions, ordering and review of laboratory studies, ordering and review of radiographic studies, pulse oximetry, re-evaluation of patient's condition and review of old charts.  Subsequent provider of critical care: I assumed direction of critical care for this patient from another provider of my specialty.        Labs Reviewed   CBC W/ AUTO DIFFERENTIAL - Abnormal; Notable for the following:        Result Value    RBC 2.82 (*)     Hemoglobin 7.7 (*)     Hematocrit 25.8 (*)     MCHC 29.8 (*)     RDW 15.8 (*)     Immature Granulocytes 2.1 (*)     Immature Grans (Abs) 0.19 (*)     Gran% 75.9 (*)     Lymph% 13.7 (*)     All other components within normal limits   COMPREHENSIVE METABOLIC PANEL - Abnormal; Notable for the following:     Glucose 127 (*)     BUN, Bld 28 (*)     Creatinine 1.5 (*)     Calcium 7.9 (*)     Albumin 2.4 (*)     eGFR if  41.0 (*)     eGFR if non  35.5 (*)     All other components within normal limits   URINALYSIS, REFLEX TO URINE CULTURE - Abnormal; Notable for the following:     Appearance, UA Cloudy (*)     Occult Blood UA 1+ (*)     Leukocytes, UA 3+ (*)     All other components within normal limits    Narrative:     Preferred Collection Type->Urine, Catheterized   URINALYSIS MICROSCOPIC - Abnormal; Notable for the following:     RBC, UA 6 (*)     WBC, UA >100 (*)     WBC Clumps, UA  Few (*)     Bacteria, UA Few (*)     All other components within normal limits    Narrative:     Preferred Collection Type->Urine, Catheterized   ISTAT PROCEDURE - Abnormal; Notable for the following:     POC PH 7.467 (*)     POC PO2 109 (*)     POC HCO3 30.8 (*)     POC TCO2 32 (*)     All other components within normal limits   CULTURE, BLOOD   CULTURE, BLOOD   CULTURE, URINE   LACTIC ACID, PLASMA   TROPONIN I   MAGNESIUM   APTT   PROTIME-INR     EKG Readings: (Independently Interpreted)   Initial Reading: No STEMI. Previous EKG Date: 6/8/2018. Rhythm: Normal Sinus Rhythm. Heart Rate: 72. Ectopy: No Ectopy. Conduction: Normal. ST Segments: Normal ST Segments. T Waves: Normal. Axis: Normal. Clinical Impression: Normal Sinus Rhythm Other Impression: no change except rate from 6/8/2018       MRA Brain without contrast   Final Result   Abnormal      1. No evidence of acute infarct   2. Chronic microvascular ischemic change   3. 5 mm aneurysm projecting medially from the supraclinoid segment of the left internal carotid artery.   This report was flagged in Epic as abnormal.         Electronically signed by: Adrienne Ordonez MD   Date:    06/16/2018   Time:    14:14      MRI Brain Without Contrast   Final Result   Abnormal      1. No evidence of acute infarct   2. Chronic microvascular ischemic change   3. 5 mm aneurysm projecting medially from the supraclinoid segment of the left internal carotid artery.   This report was flagged in Epic as abnormal.         Electronically signed by: Adrienne Ordonez MD   Date:    06/16/2018   Time:    14:14      X-Ray Chest AP Portable   Final Result      As above.         Electronically signed by: Naomy Garza MD   Date:    06/16/2018   Time:    13:28           Medical Decision Making:   History:   Old Medical Records: I decided to obtain old medical records.  Clinical Tests:   Lab Tests: Ordered and Reviewed  Radiological Study: Ordered and Reviewed  Medical Tests: Ordered and  Reviewed  Other:   I have discussed this case with another health care provider.       APC / Resident Notes:   Patient was seen in the ER promptly upon arrival. Patient intubated.  Lung auscultation does reveal diminished breath sounds bilaterally.  NG tube present.  Sepsis workup initiated.  EKG shows normal sinus rhythm at a rate of 72 beats per minute.    Neuro critical care was informed prior to transfer.  It was recommended to obtain MRI/MRA head.    Laboratory studies show normal white count of 8.9.  Hemoglobin stable at 7.7.  Chemistries reveal creatinine of 1.5 and BUN of 28.  Cardiac workup unremarkable.  Lactic acid normal 0.8.     Urinalysis does reveal 100 WBC and 6RBC.  X-ray of chest does reveal opacification of the right lung base atelectasis or consolidation. Patient recieved iv abx prior to arrival.     MRI/MRA does not show evidence of acute infarct.  5 mm aneurysm projecting medially through the supraclinoid segment of the left internal carotid artery noted.    Discussed with neuro critical care who recommended admission to Medical ICU.      The care of this patient was overseen by attending physician who agrees with treatment, plan, and disposition.             Scribe Attestation:   Scribe #1: I performed the above scribed service and the documentation accurately describes the services I performed. I attest to the accuracy of the note.               Clinical Impression:   The primary encounter diagnosis was Urinary tract infection with hematuria, site unspecified. Diagnoses of Altered mental status, Pneumonia of right lung due to infectious organism, unspecified part of lung, and Sepsis, due to unspecified organism were also pertinent to this visit.      Disposition:   Disposition: Admitted  Condition: Critical                        Patricia Newell PA-C  06/16/18 1518       Patricia Newell PA-C  06/16/18 1519

## 2018-06-16 NOTE — ASSESSMENT & PLAN NOTE
- s/p Neuro ICU stay  - discharged home from rehab on 6/15/18  - no acute changes noted on MRI/MRA

## 2018-06-16 NOTE — ASSESSMENT & PLAN NOTE
- son cannot answer which medications patient taking or if she took medications prior to arrival    - polypharmacy noted  - multiple drugs on med rec included : alprazolam, bupropion, escitalopram, and seroquel   - non ordered while inpatient

## 2018-06-17 PROBLEM — J18.9 PNEUMONIA DUE TO INFECTIOUS ORGANISM: Status: ACTIVE | Noted: 2018-06-17

## 2018-06-17 PROBLEM — Z99.11 ON MECHANICALLY ASSISTED VENTILATION: Status: RESOLVED | Noted: 2018-06-16 | Resolved: 2018-06-17

## 2018-06-17 PROBLEM — J90 PLEURAL EFFUSION: Status: ACTIVE | Noted: 2018-06-17

## 2018-06-17 LAB
ALBUMIN SERPL BCP-MCNC: 2.4 G/DL
ALLENS TEST: ABNORMAL
ALP SERPL-CCNC: 127 U/L
ALT SERPL W/O P-5'-P-CCNC: 18 U/L
ANION GAP SERPL CALC-SCNC: 11 MMOL/L
APTT BLDCRRT: 21.9 SEC
AST SERPL-CCNC: 18 U/L
BASOPHILS # BLD AUTO: 0.02 K/UL
BASOPHILS NFR BLD: 0.2 %
BILIRUB SERPL-MCNC: 0.3 MG/DL
BODY FLUID SOURCE, LDH: NORMAL
BUN SERPL-MCNC: 26 MG/DL
CALCIUM SERPL-MCNC: 7.6 MG/DL
CHLORIDE SERPL-SCNC: 106 MMOL/L
CO2 SERPL-SCNC: 25 MMOL/L
CREAT SERPL-MCNC: 1.6 MG/DL
DELSYS: ABNORMAL
DIFFERENTIAL METHOD: ABNORMAL
EOSINOPHIL # BLD AUTO: 0 K/UL
EOSINOPHIL NFR BLD: 0.1 %
ERYTHROCYTE [DISTWIDTH] IN BLOOD BY AUTOMATED COUNT: 16.1 %
ERYTHROCYTE [SEDIMENTATION RATE] IN BLOOD BY WESTERGREN METHOD: 14 MM/H
EST. GFR  (AFRICAN AMERICAN): 37.9 ML/MIN/1.73 M^2
EST. GFR  (NON AFRICAN AMERICAN): 32.9 ML/MIN/1.73 M^2
FIO2: 30
GLUCOSE SERPL-MCNC: 177 MG/DL
GRAM STN SPEC: NORMAL
GRAM STN SPEC: NORMAL
HCO3 UR-SCNC: 28.6 MMOL/L (ref 24–28)
HCT VFR BLD AUTO: 24.9 %
HGB BLD-MCNC: 7.7 G/DL
IMM GRANULOCYTES # BLD AUTO: 0.17 K/UL
IMM GRANULOCYTES NFR BLD AUTO: 1.7 %
INR PPP: 1
LDH FLD L TO P-CCNC: 664 U/L
LDH SERPL L TO P-CCNC: 578 U/L
LYMPHOCYTES # BLD AUTO: 0.8 K/UL
LYMPHOCYTES NFR BLD: 8 %
MAGNESIUM SERPL-MCNC: 1.6 MG/DL
MCH RBC QN AUTO: 28 PG
MCHC RBC AUTO-ENTMCNC: 30.9 G/DL
MCV RBC AUTO: 91 FL
MIN VOL: 8.45
MODE: ABNORMAL
MONOCYTES # BLD AUTO: 0.6 K/UL
MONOCYTES NFR BLD: 6 %
NEUTROPHILS # BLD AUTO: 8.3 K/UL
NEUTROPHILS NFR BLD: 84 %
NRBC BLD-RTO: 0 /100 WBC
PCO2 BLDA: 45.1 MMHG (ref 35–45)
PEEP: 5
PH SMN: 7.41 [PH] (ref 7.35–7.45)
PIP: 14
PLATELET # BLD AUTO: 325 K/UL
PMV BLD AUTO: 9.4 FL
PO2 BLDA: 60 MMHG (ref 80–100)
POC BE: 4 MMOL/L
POC SATURATED O2: 91 % (ref 95–100)
POC TCO2: 30 MMOL/L (ref 23–27)
POCT GLUCOSE: 117 MG/DL (ref 70–110)
POCT GLUCOSE: 148 MG/DL (ref 70–110)
POCT GLUCOSE: 187 MG/DL (ref 70–110)
POTASSIUM SERPL-SCNC: 4.6 MMOL/L
PROT FLD-MCNC: 4.6 G/DL
PROT SERPL-MCNC: 6 G/DL
PROTHROMBIN TIME: 10.6 SEC
RBC # BLD AUTO: 2.75 M/UL
SAMPLE: ABNORMAL
SITE: ABNORMAL
SODIUM SERPL-SCNC: 142 MMOL/L
SP02: 100
SPECIMEN SOURCE: NORMAL
VANCOMYCIN TROUGH SERPL-MCNC: 10 UG/ML
VT: 340
WBC # BLD AUTO: 9.9 K/UL

## 2018-06-17 PROCEDURE — 0W993ZZ DRAINAGE OF RIGHT PLEURAL CAVITY, PERCUTANEOUS APPROACH: ICD-10-PCS | Performed by: INTERNAL MEDICINE

## 2018-06-17 PROCEDURE — 25000242 PHARM REV CODE 250 ALT 637 W/ HCPCS: Performed by: STUDENT IN AN ORGANIZED HEALTH CARE EDUCATION/TRAINING PROGRAM

## 2018-06-17 PROCEDURE — 80053 COMPREHEN METABOLIC PANEL: CPT

## 2018-06-17 PROCEDURE — 85610 PROTHROMBIN TIME: CPT

## 2018-06-17 PROCEDURE — 20000000 HC ICU ROOM

## 2018-06-17 PROCEDURE — 80202 ASSAY OF VANCOMYCIN: CPT

## 2018-06-17 PROCEDURE — 87070 CULTURE OTHR SPECIMN AEROBIC: CPT

## 2018-06-17 PROCEDURE — 85730 THROMBOPLASTIN TIME PARTIAL: CPT

## 2018-06-17 PROCEDURE — 83615 LACTATE (LD) (LDH) ENZYME: CPT

## 2018-06-17 PROCEDURE — 94010 BREATHING CAPACITY TEST: CPT

## 2018-06-17 PROCEDURE — 83615 LACTATE (LD) (LDH) ENZYME: CPT | Mod: 91

## 2018-06-17 PROCEDURE — 85025 COMPLETE CBC W/AUTO DIFF WBC: CPT

## 2018-06-17 PROCEDURE — 25000003 PHARM REV CODE 250: Performed by: HOSPITALIST

## 2018-06-17 PROCEDURE — 25000003 PHARM REV CODE 250: Performed by: STUDENT IN AN ORGANIZED HEALTH CARE EDUCATION/TRAINING PROGRAM

## 2018-06-17 PROCEDURE — S0028 INJECTION, FAMOTIDINE, 20 MG: HCPCS | Performed by: STUDENT IN AN ORGANIZED HEALTH CARE EDUCATION/TRAINING PROGRAM

## 2018-06-17 PROCEDURE — 83735 ASSAY OF MAGNESIUM: CPT

## 2018-06-17 PROCEDURE — 84157 ASSAY OF PROTEIN OTHER: CPT

## 2018-06-17 PROCEDURE — 63600175 PHARM REV CODE 636 W HCPCS: Performed by: INTERNAL MEDICINE

## 2018-06-17 PROCEDURE — 82803 BLOOD GASES ANY COMBINATION: CPT

## 2018-06-17 PROCEDURE — 36600 WITHDRAWAL OF ARTERIAL BLOOD: CPT

## 2018-06-17 PROCEDURE — 99900026 HC AIRWAY MAINTENANCE (STAT)

## 2018-06-17 PROCEDURE — 99900017 HC EXTUBATION W/PARAMETERS (STAT)

## 2018-06-17 PROCEDURE — 25000003 PHARM REV CODE 250

## 2018-06-17 PROCEDURE — 97802 MEDICAL NUTRITION INDIV IN: CPT

## 2018-06-17 PROCEDURE — 63600175 PHARM REV CODE 636 W HCPCS: Performed by: STUDENT IN AN ORGANIZED HEALTH CARE EDUCATION/TRAINING PROGRAM

## 2018-06-17 PROCEDURE — 94761 N-INVAS EAR/PLS OXIMETRY MLT: CPT

## 2018-06-17 PROCEDURE — 27000221 HC OXYGEN, UP TO 24 HOURS

## 2018-06-17 PROCEDURE — 99900035 HC TECH TIME PER 15 MIN (STAT)

## 2018-06-17 PROCEDURE — 94150 VITAL CAPACITY TEST: CPT

## 2018-06-17 PROCEDURE — 99223 1ST HOSP IP/OBS HIGH 75: CPT | Mod: AI,GC,, | Performed by: INTERNAL MEDICINE

## 2018-06-17 PROCEDURE — 94640 AIRWAY INHALATION TREATMENT: CPT

## 2018-06-17 PROCEDURE — 25000003 PHARM REV CODE 250: Performed by: INTERNAL MEDICINE

## 2018-06-17 PROCEDURE — 87205 SMEAR GRAM STAIN: CPT

## 2018-06-17 RX ORDER — LIDOCAINE HYDROCHLORIDE 10 MG/ML
10 INJECTION INFILTRATION; PERINEURAL ONCE
Status: COMPLETED | OUTPATIENT
Start: 2018-06-17 | End: 2018-06-17

## 2018-06-17 RX ORDER — MAGNESIUM SULFATE HEPTAHYDRATE 40 MG/ML
2 INJECTION, SOLUTION INTRAVENOUS ONCE
Status: COMPLETED | OUTPATIENT
Start: 2018-06-17 | End: 2018-06-17

## 2018-06-17 RX ORDER — LIDOCAINE HYDROCHLORIDE 10 MG/ML
INJECTION INFILTRATION; PERINEURAL
Status: COMPLETED
Start: 2018-06-17 | End: 2018-06-17

## 2018-06-17 RX ORDER — POLYETHYLENE GLYCOL 3350 17 G/17G
17 POWDER, FOR SOLUTION ORAL DAILY
Status: DISCONTINUED | OUTPATIENT
Start: 2018-06-17 | End: 2018-06-20 | Stop reason: HOSPADM

## 2018-06-17 RX ORDER — IPRATROPIUM BROMIDE AND ALBUTEROL SULFATE 2.5; .5 MG/3ML; MG/3ML
3 SOLUTION RESPIRATORY (INHALATION) EVERY 6 HOURS
Status: DISCONTINUED | OUTPATIENT
Start: 2018-06-17 | End: 2018-06-20 | Stop reason: HOSPADM

## 2018-06-17 RX ORDER — OXYCODONE AND ACETAMINOPHEN 5; 325 MG/1; MG/1
1 TABLET ORAL EVERY 4 HOURS PRN
Status: DISCONTINUED | OUTPATIENT
Start: 2018-06-17 | End: 2018-06-20 | Stop reason: HOSPADM

## 2018-06-17 RX ADMIN — OXYCODONE HYDROCHLORIDE AND ACETAMINOPHEN 1 TABLET: 5; 325 TABLET ORAL at 07:06

## 2018-06-17 RX ADMIN — OXYCODONE HYDROCHLORIDE AND ACETAMINOPHEN 1 TABLET: 5; 325 TABLET ORAL at 03:06

## 2018-06-17 RX ADMIN — CHLORHEXIDINE GLUCONATE 0.12% ORAL RINSE 15 ML: 1.2 LIQUID ORAL at 08:06

## 2018-06-17 RX ADMIN — DEXMEDETOMIDINE HYDROCHLORIDE 1.2 MCG/KG/HR: 4 INJECTION, SOLUTION INTRAVENOUS at 03:06

## 2018-06-17 RX ADMIN — PIPERACILLIN AND TAZOBACTAM 4.5 G: 4; .5 INJECTION, POWDER, LYOPHILIZED, FOR SOLUTION INTRAVENOUS; PARENTERAL at 03:06

## 2018-06-17 RX ADMIN — IPRATROPIUM BROMIDE AND ALBUTEROL SULFATE 3 ML: .5; 3 SOLUTION RESPIRATORY (INHALATION) at 08:06

## 2018-06-17 RX ADMIN — LIDOCAINE HYDROCHLORIDE 10 ML: 10 INJECTION, SOLUTION INFILTRATION; PERINEURAL at 12:06

## 2018-06-17 RX ADMIN — PIPERACILLIN AND TAZOBACTAM 4.5 G: 4; .5 INJECTION, POWDER, LYOPHILIZED, FOR SOLUTION INTRAVENOUS; PARENTERAL at 08:06

## 2018-06-17 RX ADMIN — HEPARIN SODIUM 5000 UNITS: 5000 INJECTION, SOLUTION INTRAVENOUS; SUBCUTANEOUS at 06:06

## 2018-06-17 RX ADMIN — MAGNESIUM SULFATE IN WATER 2 G: 40 INJECTION, SOLUTION INTRAVENOUS at 05:06

## 2018-06-17 RX ADMIN — ASPIRIN 81 MG CHEWABLE TABLET 81 MG: 81 TABLET CHEWABLE at 09:06

## 2018-06-17 RX ADMIN — POLYETHYLENE GLYCOL 3350 17 G: 17 POWDER, FOR SOLUTION ORAL at 03:06

## 2018-06-17 RX ADMIN — LEVOTHYROXINE SODIUM 175 MCG: 150 TABLET ORAL at 06:06

## 2018-06-17 RX ADMIN — FAMOTIDINE 20 MG: 10 INJECTION, SOLUTION INTRAVENOUS at 08:06

## 2018-06-17 RX ADMIN — HEPARIN SODIUM 5000 UNITS: 5000 INJECTION, SOLUTION INTRAVENOUS; SUBCUTANEOUS at 09:06

## 2018-06-17 RX ADMIN — LIDOCAINE HYDROCHLORIDE 100 MG: 10 INJECTION, SOLUTION INFILTRATION; PERINEURAL at 05:06

## 2018-06-17 RX ADMIN — HEPARIN SODIUM 5000 UNITS: 5000 INJECTION, SOLUTION INTRAVENOUS; SUBCUTANEOUS at 03:06

## 2018-06-17 NOTE — CONSULTS
"  [facility]  Adult Nutrition  Consult Note    SUMMARY     Recommendations    Recommendation/Intervention:   1. If unable to advance diet, recommend starting TF.   Diabetisource @ goal rate 50mL/hr.   - Initiate @ 10mL/hr and increase by 10mL q4hrs, or as tolerated, until goal rate is reached.   - Hold for residuals >500mL.   - Provides 1440kcals, 72g protein and 982mL free water.     2. If able to advance diet, recommend Regular (diabetic if BG elevates).     RD to monitor.    Goals: Pt to receive nutrition by RD follow up  Nutrition Goal Status: new  Communication of RD Recs: reviewed with RN    Reason for Assessment    Reason for Assessment: consult  Diagnosis: other (see comments) (metabolic encephalopathy)  Relevant Medical History: HTN, HLD, COPD, T2DM, CVA  Interdisciplinary Rounds: did not attend  General Information Comments: Pt extubated this morning. No family at beside. NG tube in place.  Nutrition Discharge Planning: unable to determine at this time    Nutrition Risk Screen    Nutrition Risk Screen: no indicators present    Nutrition/Diet History    Typical Food/Fluid Intake: Pt previously tolerating meals during past admission. Now NPO s/p extubation. NG tube in place.  Food Preferences: No Hoahaoism/cultural food preferences identified at this t jerry  Do you have any cultural, spiritual, Hoahaoism conflicts, given your current situation?: none  Factors Affecting Nutritional Intake: NPO    Anthropometrics    Temp: 98.6 °F (37 °C)  Height Method: Stated  Height: 5' 1" (154.9 cm)  Height (inches): 61 in  Weight Method: Bed Scale  Weight: 67.5 kg (148 lb 13 oz)  Weight (lb): 148.81 lb  Ideal Body Weight (IBW), Female: 105 lb  % Ideal Body Weight, Female (lb): 141.72 lb  BMI (Calculated): 28.2  BMI Grade: 25 - 29.9 - overweight       Lab/Procedures/Meds    Pertinent Labs Reviewed: reviewed  Pertinent Labs Comments: HgbA1c 6.1, POCT Glu 148  Pertinent Medications Reviewed: " reviewed  Pertinent Medications Comments: heparin, precedex, fentanyl, insulin    Physical Findings/Assessment    Overall Physical Appearance: nourished, overweight, on oxygen therapy  Tubes: nasogastric tube  Skin: intact    Estimated/Assessed Needs    Weight Used For Calorie Calculations: 67.5 kg (148 lb 13 oz)  Energy Calorie Requirements (kcal): 1427  Energy Need Method: Sac-St Jeor (PAL 1.25)  Protein Requirements: 68-81g (1.0-1.2g/kg)  Weight Used For Protein Calculations: 67.5 kg (148 lb 13 oz)  Fluid Requirements (mL): 1mL/kcal or per MD     RDA Method (mL): 1427  CHO Requirement: 50% of total kcals      Nutrition Prescription Ordered    Current Diet Order: NPO    Evaluation of Received Nutrient/Fluid Intake    I/O: -I/O, good UOP  % Intake of Estimated Energy Needs: 0 - 25 %  % Meal Intake: NPO    Nutrition Risk    Level of Risk/Frequency of Follow-up:  (f/u 2x/week)     Assessment and Plan    Nutrition Problem  Inadequate energy intake    Related to (etiology):   NPO, no alternative means of nutrition    Signs and Symptoms (as evidenced by):   Pt receiving <85% EEN and EPN.     Nutrition Diagnosis Status:   New           Monitor and Evaluation    Food and Nutrient Intake: energy intake, food and beverage intake, enteral nutrition intake  Food and Nutrient Adminstration: diet order, enteral and parenteral nutrition administration  Anthropometric Measurements: weight, weight change, body mass index  Biochemical Data, Medical Tests and Procedures: electrolyte and renal panel, gastrointestinal profile, glucose/endocrine profile, inflammatory profile, lipid profile  Nutrition-Focused Physical Findings: overall appearance     Nutrition Follow-Up    RD Follow-up?: Yes

## 2018-06-17 NOTE — PROCEDURES
"Olimpia Cardoza is a 67 y.o. female patient.    Temp: 99.3 °F (37.4 °C) (18 1500)  Pulse: 107 (18 1800)  Resp: (!) 21 (18 1800)  BP: (!) 144/59 (18 1800)  SpO2: 100 % (18)  Weight: 67.5 kg (148 lb 13 oz) (18)  Height: 5' 1" (154.9 cm) (18)       Thoracentesis  Date/Time: 2018 6:12 PM  Performed by: SNEHAL KERN  Authorized by: SNEHAL KERN   Consent Done: Yes  Consent: Verbal consent obtained. Written consent obtained.  Risks and benefits: risks, benefits and alternatives were discussed  Consent given by: patient  Patient understanding: patient states understanding of the procedure being performed  Patient consent: the patient's understanding of the procedure matches consent given  Procedure consent: procedure consent matches procedure scheduled  Relevant documents: relevant documents present and verified  Test results: test results available and properly labeled  Site marked: the operative site was marked  Imaging studies: imaging studies available  Required items: required blood products, implants, devices, and special equipment available  Patient identity confirmed: , MRN, name and verbally with patient  Time out: Immediately prior to procedure a "time out" was called to verify the correct patient, procedure, equipment, support staff and site/side marked as required.  Procedure purpose: diagnostic  Indications: pleural effusion  Preparation: Patient was prepped and draped in the usual sterile fashion.  Local anesthesia used: yes    Anesthesia:  Local anesthesia used: yes  Local Anesthetic: lidocaine 1% without epinephrine  Anesthetic total: 8 mL  Patient sedated: no  Preparation: skin prepped with ChloraPrep  Patient position: supported by bedside stand  Ultrasound guidance: yes  Location: right posterior  Puncture method: over-the-needle catheter  Number of attempts: 1  Drainage amount: 4 ml  Drainage characteristics: bloody  Patient " tolerance: Patient tolerated the procedure well with no immediate complications  Complications: No          Small volume of bloody fluid obtained with real-time ultrasound guidance. Procedure was well tolerated by Pt, who had no discomfort, SOB, chest pain, or significant changes in vital signs during or immediately after procedure. Metabolic and infectious studies ordered. CXR ordered to confirm no PTX.    Jamal Baker MD  Fellow, Pulmonary & Critical Care Medicine  spectralink [id number]

## 2018-06-17 NOTE — PLAN OF CARE
Problem: Patient Care Overview  Goal: Plan of Care Review  Outcome: Ongoing (interventions implemented as appropriate)  No acute events throughout day. See vital signs and assessments in flowsheets. See below for updates on today's progress.     Pulmonary: extubated to 3L NC with SpO2 maintaining 95%; no reports of SOB, but does complain of R sided pleuritic pain; R sided pleural effusion present - bedside thoracentesis to be completed    Cardiovascular: SR 90s with SBP 110s-130s; no reports of CP    Neurological: off sedation and AAOx4; moves all extremities spontaneously without any notable deficits; Tmax 99.9    Gastrointestinal: NGT removed and pt started on diabetic diet; miralax given    Genitourinary: martell removed and purewick placed    Endocrine: SSI ordered    Integumentary/Other: sacrum and heels protected with foam dressings; bruising noted to extremities and R lower back    Infusions: Fentanyl and Precedex d/c'd; abx continue    Patient progressing towards goals as tolerated, plan of care communicated and reviewed with Olimpia Cardoza and family. All concerns addressed. Will continue to monitor.

## 2018-06-17 NOTE — PROGRESS NOTES
"[facility]  Critical Care Medicine  Progress Note    Patient Name: Olimpia Cardoza  MRN: [id number]  Admission Date: 6/16/2018  Hospital Length of Stay: 1 days  Code Status: Full Code  Attending Provider: Abhi Lopez*  Primary Care Provider: Shad Burkett MD   Principal Problem: Encephalopathy, metabolic    Subjective:     HPI:  67 y.o. CF with PMH of HTN, HLD, COPD, type 2 DM,  H/o of thyroidectomy and most recently CVA presents via EMS 2/2 to new onset altered mental status.On 6/5/18 patient had been in her usual state of health where she fell, broke several ribs, and then became altered.  Patient suffered a right MCA stroke, treated with TPA at [facility], and admitted to Neuro ICU on 6/6/18. She was subsequently discharge to a rehab facility where she completed rehab. Patient discharged from rehab yesterday. At that time patient was ambulating with assistance, talking, and eating. Last time patient was seen conscious was 10pm last night. Early the morning she was found to be confused and slurring her words. Her mentation detiorated and then she became unresponsive.   Found to have possible RLL pneumonia and UTI with a concern for sepsis. Patient was given Rocephin, azithromycin and gentamicin prior to arrival to [facility] ED. At OSH patient was intubated for worsening status. Per son, while patient was in rehab she was noted to be coughing up "phglem". He also states that he noted blood in her martell bag but "they didn't do anything about it". He states that besides her cough she denied any fever, chills, nausea, vomiting, CP, SOB, hematuria, dysuria. He does state that his mother has been constipated.     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 " Lactate 0.8, HERNAN noted with Cr 1.5, and dirty UA. CXR showed "There is opacification of the right lung base suggesting a layering pleural effusion with associated atelectasis and/or consolidation. " Patient is intubated and currently on propofol drip. MICU consulted 2/2 to concerns of new AMS and sepsis. Patient has full code status.     History provided by chart review and by talking to son, Patric, at bedside.    Hospital/ICU Course:  06/16/2018 Admitted to MICU for concerns of new onset AMS and sepsis. CXR shows possible consolidation. Dirty UA noted. Patient started on vancomycin and zosyn for broad spectrum coverage. Will attempt to wean off sedation and assess mental status. Urine culture pending, blood culture pending.   06/17/2018 NOAEON. Will wean patient off vent today and attempt to self extubate. Continue abx coverage. BP has remained stable. Resp culture growing gram pos and gram neg rods. Urine culture pending.     Interval History/Significant Events: No AEON. Doing well. Has remained stable.     Review of Systems   Unable to perform ROS: Intubated     Objective:     Vital Signs (Most Recent):  Temp: 99.4 °F (37.4 °C) (06/17/18 0800)  Pulse: 82 (06/17/18 0910)  Resp: 16 (06/17/18 0910)  BP: (!) 109/58 (06/17/18 0900)  SpO2: 100 % (06/17/18 0910) Vital Signs (24h Range):  Temp:  [96.7 °F (35.9 °C)-99.6 °F (37.6 °C)] 99.4 °F (37.4 °C)  Pulse:  [] 82  Resp:  [13-27] 16  SpO2:  [90 %-100 %] 100 %  BP: ()/(50-74) 109/58   Weight: 67.5 kg (148 lb 13 oz)  Body mass index is 28.12 kg/m².      Intake/Output Summary (Last 24 hours) at 06/17/18 0922  Last data filed at 06/17/18 0845   Gross per 24 hour   Intake           813.63 ml   Output             1975 ml   Net         -1161.37 ml       Physical Exam   Constitutional: She is oriented to person, place, and time. She appears well-developed and well-nourished.   HENT:   Head: Normocephalic " and atraumatic.   ET tube in place   Eyes: EOM are normal. Pupils are equal, round, and reactive to light.   Neck: Normal range of motion. Neck supple.   Cardiovascular: Normal rate, regular rhythm, normal heart sounds and intact distal pulses.    Pulmonary/Chest: Effort normal and breath sounds normal.   Breathing spontaneously on vent    Abdominal: Soft. Bowel sounds are normal. She exhibits no distension.   Neurological: She is alert and oriented to person, place, and time.   Intubated however awake and alert. Shakes and nods when answering questions. Moves all 4 extremities spontaneously    Skin: Skin is warm.   Vitals reviewed.      Vents:  Vent Mode: Spont (06/17/18 0910)  Ventilator Initiated: Yes (06/16/18 1218)  Set Rate: 0 bmp (06/17/18 0910)  Vt Set: 340 mL (06/17/18 0910)  Pressure Support: 5 cmH20 (06/17/18 0910)  PEEP/CPAP: 5 cmH20 (06/17/18 0910)  Oxygen Concentration (%): 40 (06/17/18 0910)  Peak Airway Pressure: 11 cmH2O (06/17/18 0910)  Plateau Pressure: 0 cmH20 (06/17/18 0910)  Total Ve: 6.95 mL (06/17/18 0910)  Negative Inspiratory Force (cm H2O): -35 (06/17/18 0840)  F/VT Ratio<105 (RSBI): (!) 28.73 (06/17/18 0910)  Lines/Drains/Airways     Drain                 NG/OG Tube 06/16/18 1219 16 Fr. Left nostril less than 1 day         Urethral Catheter 06/16/18 1218 less than 1 day          Airway                 Airway - Non-Surgical 06/16/18 1220 Endotracheal Tube less than 1 day          Peripheral Intravenous Line                 Peripheral IV - Single Lumen 06/16/18 1217 Right Antecubital less than 1 day         Peripheral IV - Single Lumen 06/16/18 1601 Right Hand less than 1 day         Peripheral IV - Single Lumen 06/17/18 0025 Left Hand less than 1 day              Significant Labs:    CBC/Anemia Profile:    Recent Labs  Lab 06/16/18  1221 06/17/18  0346   WBC 8.90 9.90   HGB 7.7* 7.7*   HCT 25.8* 24.9*    325   MCV 92 91   RDW 15.8* 16.1*        Chemistries:    Recent Labs  Lab  06/16/18  1221 06/16/18  1752 06/17/18  0346    146* 142   K 5.0 4.4 4.6    106 106   CO2 28 24 25   BUN 28* 24* 26*   CREATININE 1.5* 1.3 1.6*   CALCIUM 7.9* 7.1* 7.6*   ALBUMIN 2.4* 2.1* 2.4*   PROT 6.0 5.5* 6.0   BILITOT 0.2 0.2 0.3   ALKPHOS 126 111 127   ALT 17 17 18   AST 19 19 18   MG 1.6  --  1.6   PHOS 4.1  --   --        BMP:   Recent Labs  Lab 06/17/18  0346   *      K 4.6      CO2 25   BUN 26*   CREATININE 1.6*   CALCIUM 7.6*   MG 1.6     CMP:   Recent Labs  Lab 06/16/18  1221 06/16/18  1752 06/17/18  0346    146* 142   K 5.0 4.4 4.6    106 106   CO2 28 24 25   * 109 177*   BUN 28* 24* 26*   CREATININE 1.5* 1.3 1.6*   CALCIUM 7.9* 7.1* 7.6*   PROT 6.0 5.5* 6.0   ALBUMIN 2.4* 2.1* 2.4*   BILITOT 0.2 0.2 0.3   ALKPHOS 126 111 127   AST 19 19 18   ALT 17 17 18   ANIONGAP 10 16 11   EGFRNONAA 35.5* 42.3* 32.9*     Respiratory Culture:   Recent Labs  Lab 06/16/18  2202   GSRESP <10 epithelial cells per low power field.  Rare WBC's  Rare Gram positive rods  Rare Gram negative rods     Urine Culture: No results for input(s): LABURIN in the last 48 hours.  Urine Studies:   Recent Labs  Lab 06/16/18  1221   COLORU Yellow   APPEARANCEUA Cloudy*   PHUR 7.0   SPECGRAV 1.005   PROTEINUA Negative   GLUCUA Negative   KETONESU Negative   BILIRUBINUA Negative   OCCULTUA 1+*   NITRITE Negative   UROBILINOGEN Negative   LEUKOCYTESUR 3+*   RBCUA 6*   WBCUA >100*   BACTERIA Few*       Significant Imaging:  I have reviewed all pertinent imaging results/findings within the past 24 hours.    Assessment/Plan:     Neuro   * Encephalopathy, metabolic    - unclear etiology  - differential diagnosis includes infection in the setting of possible pneumonia and UTI, polypharmacy as patient has multiple medications she is taking, or potentially new onset neurological cause, dehydration and HERNAN from recently being discharged from hospital.   - MRI/ MRA head showed no new acute  neurological findings  - consider EEG if mentation does not improve  - mentation improving this AM. Plan for extubation. Will reassess after extubation  - blood cultures, urine cultures pending  - resp cultures growing gram pos and gram neg rods prelim         Right MCA ischemic stroke, aborted by t-PA    - s/p Neuro ICU stay  - discharged home from rehab on 6/15/18  - no acute changes noted on MRI/MRA on 6/16/18        Psychiatric   Depression    - son cannot answer which medications patient taking or if she took medications prior to arrival    - polypharmacy noted  - multiple drugs on med rec included : alprazolam, bupropion, escitalopram, and seroquel   - non ordered while inpatient         Pulmonary   COPD (chronic obstructive pulmonary disease)    - chronic COPD   - continue albuterol and breo treatment while inpatient           Cardiac/Vascular   Essential hypertension    BP: ()/(50-74) 109/58  - lopressor 25mg daily  - hold home meds while inpatient, currently normotensive        Renal/   Urinary tract infection with hematuria    - dirty UA on admission  - urine culture pending  - on vancomycin and zosyn for broad spectrum coverage         HERNAN (acute kidney injury)    - Cr 1.6,  baseline 1.1  - BMP q6 hours   - consider IV fluids as long as fluid status is not overloaded.         Endocrine   Other specified hypothyroidism    - history of thyroidectomy  - home synthroid 175mg daily, held while inpatient         Type 2 diabetes mellitus, without long-term current use of insulin    - glucose 122  - NPO while inpatient  - SSI while inpatient  - Home medications include glimeiperide, metformin            Critical Care Daily Checklist:    A: Awake: RASS Goal/Actual Goal:    Actual: Rivera Agitation Sedation Scale (RASS): Alert and calm   B: Spontaneous Breathing Trial Performed? Spon. Breathing Trial Initiated?: Initiated (per. Dr. Chanel) (06/17/18 6371)   C: SAT & SBT Coordinated?  yes                       D: Delirium: CAM-ICU Overall CAM-ICU: Negative   E: Early Mobility Performed? No   F: Feeding Goal:    Status:     Current Diet Order   Procedures    Diet NPO      AS: Analgesia/Sedation Wean off   T: Thromboembolic Prophylaxis yes   H: HOB > 300 Yes   U: Stress Ulcer Prophylaxis (if needed) yes   G: Glucose Control SSI   B: Bowel Function     I: Indwelling Catheter (Lines & Fong) Necessity Fong, peripheral iv   D: De-escalation of Antimicrobials/Pharmacotherapies continue    Plan for the day/ETD Extubate and step down    Code Status:  Family/Goals of Care: Full Code            Anjana De La Paz MD  Critical Care Medicine  [facility]

## 2018-06-17 NOTE — PROGRESS NOTES
MD Baker at bedside for thoracentesis. Consent signed and in chart. VSS. Will continue to monitor.

## 2018-06-17 NOTE — PLAN OF CARE
Problem: Patient Care Overview  Goal: Plan of Care Review  Outcome: Ongoing (interventions implemented as appropriate)  Patient is medically sedated with precedex and fentanyl drip. Responds to verbal stimulus. Obeys command. Remained afebrile. On sinus rhythm. Fluctuations on BP noted. Ventilator settings kept at FIO2 30%, PEEP 5,  and BUR 14. NGT in place. Kept patient on NPO. No BM within the shift. FC in place. Antibiotic therapy continued. POC discussed with family. Will continue to monitor.

## 2018-06-17 NOTE — ASSESSMENT & PLAN NOTE
- s/p Neuro ICU stay  - discharged home from rehab on 6/15/18  - no acute changes noted on MRI/MRA on 6/16/18

## 2018-06-17 NOTE — PROGRESS NOTES
06/17/18 0840   Negative Inspiratory Force   $ Negative Inspiratory Force Charges Given   Negative Inspiratory Force (cm H2O) -35   Effort (Negative Inspiratory Force) good   Vital Capacity   $ Vital Capacity Charges Given   Vital Capacity (mL) 730   Effort (Vital Capacity) good   pt. is able to follow commands; lifts head from bed; audible cuff leak; ventilator mode: 5 pressure support 5 peep 40% fiO2

## 2018-06-17 NOTE — PLAN OF CARE
Problem: Patient Care Overview  Goal: Plan of Care Review  Outcome: Ongoing (interventions implemented as appropriate)  Nutrition assessment completed. Please see RD note for details.      Recommendation/Intervention:   1. If unable to advance diet, recommend starting TF.   Diabetisource @ goal rate 50mL/hr.   - Initiate @ 10mL/hr and increase by 10mL q4hrs, or as tolerated, until goal rate is reached.   - Hold for residuals >500mL.   - Provides 1440kcals, 72g protein and 982mL free water.     2. If able to advance diet, recommend Regular (diabetic if BG elevates).     RD to monitor.    Goals: Pt to receive nutrition by RD follow up  Nutrition Goal Status: new  Communication of RD Recs: reviewed with RN

## 2018-06-17 NOTE — ASSESSMENT & PLAN NOTE
- Cr 1.6,  baseline 1.1  - BMP q6 hours   - consider IV fluids as long as fluid status is not overloaded.

## 2018-06-17 NOTE — SUBJECTIVE & OBJECTIVE
Interval History/Significant Events: No AEON. Doing well. Has remained stable.     Review of Systems   Unable to perform ROS: Intubated     Objective:     Vital Signs (Most Recent):  Temp: 99.4 °F (37.4 °C) (06/17/18 0800)  Pulse: 82 (06/17/18 0910)  Resp: 16 (06/17/18 0910)  BP: (!) 109/58 (06/17/18 0900)  SpO2: 100 % (06/17/18 0910) Vital Signs (24h Range):  Temp:  [96.7 °F (35.9 °C)-99.6 °F (37.6 °C)] 99.4 °F (37.4 °C)  Pulse:  [] 82  Resp:  [13-27] 16  SpO2:  [90 %-100 %] 100 %  BP: ()/(50-74) 109/58   Weight: 67.5 kg (148 lb 13 oz)  Body mass index is 28.12 kg/m².      Intake/Output Summary (Last 24 hours) at 06/17/18 0922  Last data filed at 06/17/18 0845   Gross per 24 hour   Intake           813.63 ml   Output             1975 ml   Net         -1161.37 ml       Physical Exam   Constitutional: She is oriented to person, place, and time. She appears well-developed and well-nourished.   HENT:   Head: Normocephalic and atraumatic.   ET tube in place   Eyes: EOM are normal. Pupils are equal, round, and reactive to light.   Neck: Normal range of motion. Neck supple.   Cardiovascular: Normal rate, regular rhythm, normal heart sounds and intact distal pulses.    Pulmonary/Chest: Effort normal and breath sounds normal.   Breathing spontaneously on vent    Abdominal: Soft. Bowel sounds are normal. She exhibits no distension.   Neurological: She is alert and oriented to person, place, and time.   Intubated however awake and alert. Shakes and nods when answering questions. Moves all 4 extremities spontaneously    Skin: Skin is warm.   Vitals reviewed.      Vents:  Vent Mode: Spont (06/17/18 0910)  Ventilator Initiated: Yes (06/16/18 1218)  Set Rate: 0 bmp (06/17/18 0910)  Vt Set: 340 mL (06/17/18 0910)  Pressure Support: 5 cmH20 (06/17/18 0910)  PEEP/CPAP: 5 cmH20 (06/17/18 0910)  Oxygen Concentration (%): 40 (06/17/18 0910)  Peak Airway Pressure: 11 cmH2O (06/17/18 0910)  Plateau Pressure: 0 cmH20 (06/17/18  0910)  Total Ve: 6.95 mL (06/17/18 0910)  Negative Inspiratory Force (cm H2O): -35 (06/17/18 0840)  F/VT Ratio<105 (RSBI): (!) 28.73 (06/17/18 0910)  Lines/Drains/Airways     Drain                 NG/OG Tube 06/16/18 1219 16 Fr. Left nostril less than 1 day         Urethral Catheter 06/16/18 1218 less than 1 day          Airway                 Airway - Non-Surgical 06/16/18 1220 Endotracheal Tube less than 1 day          Peripheral Intravenous Line                 Peripheral IV - Single Lumen 06/16/18 1217 Right Antecubital less than 1 day         Peripheral IV - Single Lumen 06/16/18 1601 Right Hand less than 1 day         Peripheral IV - Single Lumen 06/17/18 0025 Left Hand less than 1 day              Significant Labs:    CBC/Anemia Profile:    Recent Labs  Lab 06/16/18  1221 06/17/18  0346   WBC 8.90 9.90   HGB 7.7* 7.7*   HCT 25.8* 24.9*    325   MCV 92 91   RDW 15.8* 16.1*        Chemistries:    Recent Labs  Lab 06/16/18  1221 06/16/18  1752 06/17/18  0346    146* 142   K 5.0 4.4 4.6    106 106   CO2 28 24 25   BUN 28* 24* 26*   CREATININE 1.5* 1.3 1.6*   CALCIUM 7.9* 7.1* 7.6*   ALBUMIN 2.4* 2.1* 2.4*   PROT 6.0 5.5* 6.0   BILITOT 0.2 0.2 0.3   ALKPHOS 126 111 127   ALT 17 17 18   AST 19 19 18   MG 1.6  --  1.6   PHOS 4.1  --   --        BMP:   Recent Labs  Lab 06/17/18  0346   *      K 4.6      CO2 25   BUN 26*   CREATININE 1.6*   CALCIUM 7.6*   MG 1.6     CMP:   Recent Labs  Lab 06/16/18  1221 06/16/18  1752 06/17/18  0346    146* 142   K 5.0 4.4 4.6    106 106   CO2 28 24 25   * 109 177*   BUN 28* 24* 26*   CREATININE 1.5* 1.3 1.6*   CALCIUM 7.9* 7.1* 7.6*   PROT 6.0 5.5* 6.0   ALBUMIN 2.4* 2.1* 2.4*   BILITOT 0.2 0.2 0.3   ALKPHOS 126 111 127   AST 19 19 18   ALT 17 17 18   ANIONGAP 10 16 11   EGFRNONAA 35.5* 42.3* 32.9*     Respiratory Culture:   Recent Labs  Lab 06/16/18  2202   GSRESP <10 epithelial cells per low power field.  Rare WBC's  Rare  Gram positive rods  Rare Gram negative rods     Urine Culture: No results for input(s): LABURIN in the last 48 hours.  Urine Studies:   Recent Labs  Lab 06/16/18  1221   COLORU Yellow   APPEARANCEUA Cloudy*   PHUR 7.0   SPECGRAV 1.005   PROTEINUA Negative   GLUCUA Negative   KETONESU Negative   BILIRUBINUA Negative   OCCULTUA 1+*   NITRITE Negative   UROBILINOGEN Negative   LEUKOCYTESUR 3+*   RBCUA 6*   WBCUA >100*   BACTERIA Few*       Significant Imaging:  I have reviewed all pertinent imaging results/findings within the past 24 hours.

## 2018-06-17 NOTE — PROGRESS NOTES
[id number] patient transported from ED 3 to bed 3065 via 100% Fio2 Ambu bagging with oxygen to [facility]. Mask at [facility] Patient tolerated well no complications noted. Report and care of patient turned over to RT Paola.

## 2018-06-17 NOTE — ASSESSMENT & PLAN NOTE
- unclear etiology  - differential diagnosis includes infection in the setting of possible pneumonia and UTI, polypharmacy as patient has multiple medications she is taking, or potentially new onset neurological cause, dehydration and HERNAN from recently being discharged from hospital.   - MRI/ MRA head showed no new acute neurological findings  - consider EEG if mentation does not improve  - mentation improving this AM. Plan for extubation. Will reassess after extubation  - blood cultures, urine cultures pending  - resp cultures growing gram pos and gram neg rods prelim

## 2018-06-17 NOTE — ASSESSMENT & PLAN NOTE
BP: ()/(50-74) 109/58  - lopressor 25mg daily  - hold home meds while inpatient, currently normotensive

## 2018-06-18 PROBLEM — R14.0 ABDOMINAL DISTENSION: Status: ACTIVE | Noted: 2018-06-18

## 2018-06-18 LAB
ALBUMIN SERPL BCP-MCNC: 2.3 G/DL
ALP SERPL-CCNC: 118 U/L
ALT SERPL W/O P-5'-P-CCNC: 15 U/L
ANION GAP SERPL CALC-SCNC: 11 MMOL/L
APTT BLDCRRT: 24.6 SEC
AST SERPL-CCNC: 15 U/L
BACTERIA UR CULT: NORMAL
BASOPHILS # BLD AUTO: 0.02 K/UL
BASOPHILS NFR BLD: 0.3 %
BILIRUB SERPL-MCNC: 0.4 MG/DL
BUN SERPL-MCNC: 22 MG/DL
CALCIUM SERPL-MCNC: 7.2 MG/DL
CHLORIDE SERPL-SCNC: 108 MMOL/L
CO2 SERPL-SCNC: 23 MMOL/L
CREAT SERPL-MCNC: 1.4 MG/DL
DIFFERENTIAL METHOD: ABNORMAL
EOSINOPHIL # BLD AUTO: 0.1 K/UL
EOSINOPHIL NFR BLD: 1.3 %
ERYTHROCYTE [DISTWIDTH] IN BLOOD BY AUTOMATED COUNT: 16.5 %
EST. GFR  (AFRICAN AMERICAN): 44.5 ML/MIN/1.73 M^2
EST. GFR  (NON AFRICAN AMERICAN): 38.6 ML/MIN/1.73 M^2
GLUCOSE SERPL-MCNC: 148 MG/DL
HCT VFR BLD AUTO: 23.3 %
HGB BLD-MCNC: 7.1 G/DL
IMM GRANULOCYTES # BLD AUTO: 0.07 K/UL
IMM GRANULOCYTES NFR BLD AUTO: 1 %
INR PPP: 1.1
LYMPHOCYTES # BLD AUTO: 1.6 K/UL
LYMPHOCYTES NFR BLD: 23.1 %
MAGNESIUM SERPL-MCNC: 2.1 MG/DL
MCH RBC QN AUTO: 28.1 PG
MCHC RBC AUTO-ENTMCNC: 30.5 G/DL
MCV RBC AUTO: 92 FL
MONOCYTES # BLD AUTO: 0.8 K/UL
MONOCYTES NFR BLD: 11.2 %
NEUTROPHILS # BLD AUTO: 4.5 K/UL
NEUTROPHILS NFR BLD: 63.1 %
NRBC BLD-RTO: 0 /100 WBC
PLATELET # BLD AUTO: 286 K/UL
PMV BLD AUTO: 9.4 FL
POCT GLUCOSE: 128 MG/DL (ref 70–110)
POCT GLUCOSE: 139 MG/DL (ref 70–110)
POCT GLUCOSE: 163 MG/DL (ref 70–110)
POCT GLUCOSE: 169 MG/DL (ref 70–110)
POCT GLUCOSE: 198 MG/DL (ref 70–110)
POTASSIUM SERPL-SCNC: 4.1 MMOL/L
PROT SERPL-MCNC: 5.8 G/DL
PROTHROMBIN TIME: 11.2 SEC
RBC # BLD AUTO: 2.53 M/UL
SODIUM SERPL-SCNC: 142 MMOL/L
WBC # BLD AUTO: 7.06 K/UL

## 2018-06-18 PROCEDURE — 99233 SBSQ HOSP IP/OBS HIGH 50: CPT | Mod: ,,, | Performed by: HOSPITALIST

## 2018-06-18 PROCEDURE — 83735 ASSAY OF MAGNESIUM: CPT

## 2018-06-18 PROCEDURE — 25000003 PHARM REV CODE 250: Performed by: STUDENT IN AN ORGANIZED HEALTH CARE EDUCATION/TRAINING PROGRAM

## 2018-06-18 PROCEDURE — 25000242 PHARM REV CODE 250 ALT 637 W/ HCPCS: Performed by: STUDENT IN AN ORGANIZED HEALTH CARE EDUCATION/TRAINING PROGRAM

## 2018-06-18 PROCEDURE — 94640 AIRWAY INHALATION TREATMENT: CPT

## 2018-06-18 PROCEDURE — 20600001 HC STEP DOWN PRIVATE ROOM

## 2018-06-18 PROCEDURE — 85025 COMPLETE CBC W/AUTO DIFF WBC: CPT

## 2018-06-18 PROCEDURE — 63600175 PHARM REV CODE 636 W HCPCS: Performed by: STUDENT IN AN ORGANIZED HEALTH CARE EDUCATION/TRAINING PROGRAM

## 2018-06-18 PROCEDURE — 25000003 PHARM REV CODE 250: Performed by: HOSPITALIST

## 2018-06-18 PROCEDURE — 36415 COLL VENOUS BLD VENIPUNCTURE: CPT

## 2018-06-18 PROCEDURE — 27000221 HC OXYGEN, UP TO 24 HOURS

## 2018-06-18 PROCEDURE — 94761 N-INVAS EAR/PLS OXIMETRY MLT: CPT

## 2018-06-18 PROCEDURE — 85730 THROMBOPLASTIN TIME PARTIAL: CPT

## 2018-06-18 PROCEDURE — 85610 PROTHROMBIN TIME: CPT

## 2018-06-18 PROCEDURE — 80053 COMPREHEN METABOLIC PANEL: CPT

## 2018-06-18 PROCEDURE — 51798 US URINE CAPACITY MEASURE: CPT

## 2018-06-18 RX ORDER — QUETIAPINE FUMARATE 25 MG/1
100 TABLET, FILM COATED ORAL NIGHTLY
Status: DISCONTINUED | OUTPATIENT
Start: 2018-06-18 | End: 2018-06-20 | Stop reason: HOSPADM

## 2018-06-18 RX ORDER — METOPROLOL TARTRATE 25 MG/1
25 TABLET, FILM COATED ORAL EVERY 8 HOURS
Status: DISCONTINUED | OUTPATIENT
Start: 2018-06-18 | End: 2018-06-20 | Stop reason: HOSPADM

## 2018-06-18 RX ORDER — LACTULOSE 10 G/15ML
20 SOLUTION ORAL ONCE
Status: COMPLETED | OUTPATIENT
Start: 2018-06-18 | End: 2018-06-18

## 2018-06-18 RX ORDER — CEFTRIAXONE 1 G/1
1 INJECTION, POWDER, FOR SOLUTION INTRAMUSCULAR; INTRAVENOUS
Status: DISCONTINUED | OUTPATIENT
Start: 2018-06-19 | End: 2018-06-20 | Stop reason: HOSPADM

## 2018-06-18 RX ORDER — QUETIAPINE FUMARATE 300 MG/1
600 TABLET, FILM COATED ORAL NIGHTLY
Status: DISCONTINUED | OUTPATIENT
Start: 2018-06-18 | End: 2018-06-18

## 2018-06-18 RX ORDER — FUROSEMIDE 20 MG/1
20 TABLET ORAL 2 TIMES DAILY
Status: DISCONTINUED | OUTPATIENT
Start: 2018-06-18 | End: 2018-06-20 | Stop reason: HOSPADM

## 2018-06-18 RX ADMIN — OXYCODONE HYDROCHLORIDE AND ACETAMINOPHEN 1 TABLET: 5; 325 TABLET ORAL at 02:06

## 2018-06-18 RX ADMIN — IPRATROPIUM BROMIDE AND ALBUTEROL SULFATE 3 ML: .5; 3 SOLUTION RESPIRATORY (INHALATION) at 07:06

## 2018-06-18 RX ADMIN — FUROSEMIDE 20 MG: 20 TABLET ORAL at 09:06

## 2018-06-18 RX ADMIN — PIPERACILLIN AND TAZOBACTAM 4.5 G: 4; .5 INJECTION, POWDER, LYOPHILIZED, FOR SOLUTION INTRAVENOUS; PARENTERAL at 12:06

## 2018-06-18 RX ADMIN — OXYCODONE HYDROCHLORIDE AND ACETAMINOPHEN 1 TABLET: 5; 325 TABLET ORAL at 04:06

## 2018-06-18 RX ADMIN — LEVOTHYROXINE SODIUM 175 MCG: 150 TABLET ORAL at 06:06

## 2018-06-18 RX ADMIN — IPRATROPIUM BROMIDE AND ALBUTEROL SULFATE 3 ML: .5; 3 SOLUTION RESPIRATORY (INHALATION) at 12:06

## 2018-06-18 RX ADMIN — IPRATROPIUM BROMIDE AND ALBUTEROL SULFATE 3 ML: .5; 3 SOLUTION RESPIRATORY (INHALATION) at 08:06

## 2018-06-18 RX ADMIN — OXYCODONE HYDROCHLORIDE AND ACETAMINOPHEN 1 TABLET: 5; 325 TABLET ORAL at 11:06

## 2018-06-18 RX ADMIN — METOPROLOL TARTRATE 25 MG: 25 TABLET ORAL at 09:06

## 2018-06-18 RX ADMIN — HEPARIN SODIUM 5000 UNITS: 5000 INJECTION, SOLUTION INTRAVENOUS; SUBCUTANEOUS at 09:06

## 2018-06-18 RX ADMIN — QUETIAPINE FUMARATE 100 MG: 25 TABLET ORAL at 09:06

## 2018-06-18 RX ADMIN — IPRATROPIUM BROMIDE AND ALBUTEROL SULFATE 3 ML: .5; 3 SOLUTION RESPIRATORY (INHALATION) at 01:06

## 2018-06-18 RX ADMIN — LACTULOSE 20 G: 20 SOLUTION ORAL at 04:06

## 2018-06-18 RX ADMIN — POLYETHYLENE GLYCOL 3350 17 G: 17 POWDER, FOR SOLUTION ORAL at 10:06

## 2018-06-18 RX ADMIN — ASPIRIN 81 MG CHEWABLE TABLET 81 MG: 81 TABLET CHEWABLE at 09:06

## 2018-06-18 RX ADMIN — HEPARIN SODIUM 5000 UNITS: 5000 INJECTION, SOLUTION INTRAVENOUS; SUBCUTANEOUS at 04:06

## 2018-06-18 RX ADMIN — HEPARIN SODIUM 5000 UNITS: 5000 INJECTION, SOLUTION INTRAVENOUS; SUBCUTANEOUS at 06:06

## 2018-06-18 NOTE — ASSESSMENT & PLAN NOTE
· Suspect some blood loss with hematoma and extensive bruising  · Continue to monitor with twice weekly labs

## 2018-06-18 NOTE — PLAN OF CARE
Problem: Patient Care Overview  Goal: Plan of Care Review  Outcome: Ongoing (interventions implemented as appropriate)  VSS. No complaints of pain.  A/Ox4.  No acute events after transfer at 0230. ICU nurse stated patient had been straight cath'ed prior to transfer.  Patient has yet to void on her own.  Patient stated she did not feel the need to void at morning med pass.  Safety maintained.  All questions answered. Patient updated on plan of care.  Will continue to monitor.

## 2018-06-18 NOTE — PLAN OF CARE
Problem: Patient Care Overview  Goal: Plan of Care Review  Outcome: Ongoing (interventions implemented as appropriate)  Pt AAOX4. AVSS. C/o pain during shift controlled with PRN pain medication. Fong cath placed per order for retention. Lactulose added for constipation, seroquel added for sleep. Pt safety maintaned. Hourly rounding performed.

## 2018-06-18 NOTE — RESIDENT HANDOFF
"ICU Transfer of Care Note  Critical Care Medicine    Admit Date: 6/16/2018  LOS: 1    CC: Encephalopathy, metabolic    Code Status: Full Code     Transfer to Hospital Medicine discussed with Dr. Guzman at 23:40.     HPI and Hospital Course:     HPI:  67 y.o. CF with PMH of HTN, HLD, COPD, type 2 DM,  H/o of thyroidectomy and most recently CVA presents via EMS 2/2 to new onset altered mental status.On 6/5/18 patient had been in her usual state of health where she fell, broke several ribs, and then became altered.  Patient suffered a right MCA stroke, treated with TPA at [facility], and admitted to Neuro ICU on 6/6/18. She was subsequently discharge to a rehab facility where she completed rehab. Patient discharged from rehab yesterday. At that time patient was ambulating with assistance, talking, and eating. Last time patient was seen conscious was 10pm last night. Early the morning she was found to be confused and slurring her words. Her mentation detiorated and then she became unresponsive.   Found to have possible RLL pneumonia and UTI with a concern for sepsis. Patient was given Rocephin, azithromycin and gentamicin prior to arrival to [facility] ED. At OSH patient was intubated for worsening status. Per son, while patient was in rehab she was noted to be coughing up "phglem". He also states that he noted blood in her martell bag but "they didn't do anything about it". He states that besides her cough she denied any fever, chills, nausea, vomiting, CP, SOB, hematuria, dysuria. He does state that his mother has been constipated.     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 " Lactate 0.8, HERNAN noted with Cr 1.5, and dirty UA. CXR " "showed "There is opacification of the right lung base suggesting a layering pleural effusion with associated atelectasis and/or consolidation. " Patient is intubated and currently on propofol drip. MICU consulted 2/2 to concerns of new AMS and sepsis. Patient has full code status.     History provided by chart review and by talking to son, Patric, at bedside.    Hospital/ICU Course:  06/16/2018 Admitted to MICU for concerns of new onset AMS and sepsis. CXR shows possible consolidation. Dirty UA noted. Patient started on vancomycin and zosyn for broad spectrum coverage. Will attempt to wean off sedation and assess mental status. Urine culture pending, blood culture pending.   06/17/2018 NOAEON. Will wean patient off vent today and attempt to self extubate. Continue abx coverage. BP has remained stable. Resp culture growing gram pos and gram neg rods. Urine culture pending.     The patient was successfully extubated to nasal cannula on 6/17. Thoracentesis attempted on 6/17 with small volume return. Martell removed on 6/17 as well with subsequent urinary retention on first post-martell removal protocol bladder scan which required straight cath.     To Follow Up:     --Respiratory and pleural fluid cultures. Ucx with presumptive E coli, speciation and sensitivities pending. Bcx NGTD. Patient currently on zosyn.   --monitor mental status. Suspect encephalopathy partially due in part to polypharmacy after patient resumed home meds following discharge from rehab. Consider EEG to r/o sz if mental status worsens.  --monitor for further urinary retention      Discharge Plan:     Will likely need some form of rehab following discharge.     Call [phone] with questions.     Adela Palma NP  Critical Care Medicine    "

## 2018-06-18 NOTE — DISCHARGE SUMMARY
[facility] - [facility]  Department of Hospital Medicine  Discharge Summary      Patient Name: Olimpia Cardoza  MRN: [id number]  Admission Date: 6/9/2018  Hospital Length of Stay: 6 days  Discharge Date and Time: 6/15/2018  4:45 PM  Attending Physician: Allison Rajan. MD  Discharging Provider: Keesha Gasca NP  Primary Care Provider: Shad Burkett MD    Chief Complaint/Reason for Admission: CVA (cerebral vascular accident)    History of Present Illness:  Patient is a 67 y.o. female with HTN, HLP, DM2, COPD who presents to SNF after hospitalization for acute stroke requiring tPA.  She remains with minimal left sided weakness. She was wearing a cervical collar due to neck pain with negative x-rays and a poor cervical spine MRI with no gross abnormalities which was discontinued prior to discharge.  She has extensive bruising and has history of a fall prior to being found unresponsive.  Rib fractures present on imaging.  She complains of a cough with thick sputum. She has a chronic cough at home with green sputum but not as milky like currently.  She has chronic MUÑOZ which is stable and wears oxygen at home. The patient has been admitted to SNF for ongoing PT/OT due to insufficient progress to go home safely from the hospital.    Hospital Course:   The patient was admitted at [facility] from 6/4 to 6/9/2018.  6/9: Patient admitted to SNF for ongoing PT/Ot following a hospitalization R MCA stroke requiring tPA.  6/11: Patient seen at bedside doing well, reports soreness to right flank area and increase cough.  6/12: Patient seen at bedside, report improvement in in SOB and cough from additional dose of lasix.  6/13: Patient seen at bedside, reports increase in cough, weight increased change daily lasix to bid  6/14: patient seen at bedside, lung sounds clear, cough improved.  6/15: Patient seen at bedside, doing well, lung sound clear, discussed discharge with patient and son at bedside, patient does not have her  portable O2 for discharge. Patient is 90% RA at rest off of O2 for 20 mins. Patient to be assisted to car on O2. Okay to travel without O2 via car home. Family to connect portable tank once home before activity. Patient and family verbalized understanding. Patient discharged home with home health services.     Significant Diagnostic Studies:   Results for ZAK LÓPEZ (MRN [id number]) as of 6/18/2018 09:31   Ref. Range 6/14/2018 06:09   WBC Latest Ref Range: 3.90 - 12.70 K/uL 6.16   RBC Latest Ref Range: 4.00 - 5.40 M/uL 2.91 (L)   Hemoglobin Latest Ref Range: 12.0 - 16.0 g/dL 8.1 (L)   Hematocrit Latest Ref Range: 37.0 - 48.5 % 25.9 (L)   MCV Latest Ref Range: 82 - 98 fL 89   MCH Latest Ref Range: 27.0 - 31.0 pg 27.8   MCHC Latest Ref Range: 32.0 - 36.0 g/dL 31.3 (L)   RDW Latest Ref Range: 11.5 - 14.5 % 15.5 (H)   Results for ZAK LÓPEZ (MRN [id number]) as of 6/18/2018 09:31   Ref. Range 6/14/2018 06:09   Sodium Latest Ref Range: 136 - 145 mmol/L 142   Potassium Latest Ref Range: 3.5 - 5.1 mmol/L 3.4 (L)   Chloride Latest Ref Range: 95 - 110 mmol/L 100   CO2 Latest Ref Range: 23 - 29 mmol/L 35 (H)   Anion Gap Latest Ref Range: 8 - 16 mmol/L 7 (L)   BUN, Bld Latest Ref Range: 8 - 23 mg/dL 18   Creatinine Latest Ref Range: 0.5 - 1.4 mg/dL 1.1   eGFR if non African American Latest Ref Range: >60 mL/min/1.73 m^2 51.7 (A)   eGFR if African American Latest Ref Range: >60 mL/min/1.73 m^2 59.6 (A)   Glucose Latest Ref Range: 70 - 110 mg/dL 127 (H)       Final Active Diagnoses:    Diagnosis Date Noted POA    PRINCIPAL PROBLEM:  Right MCA ischemic stroke, aborted by t-PA [I63.411] 06/04/2018 Yes    Right rib fracture [S22.31XA] 06/10/2018 Yes    Hematoma, chest wall [S20.219A] 06/10/2018 Yes    Chronic respiratory failure with hypoxia [J96.11] 06/10/2018 Yes    Chronic congestive heart failure [I50.9] 06/10/2018 Yes    Anemia [D64.9] 06/10/2018 Yes    COPD (chronic obstructive pulmonary disease) [J44.9] 06/08/2018  Yes    Other specified hypothyroidism [E03.8] 06/05/2018 Yes    Dyslipidemia [E78.5] 03/10/2015 Yes    Essential hypertension [I10] 02/24/2015 Yes    Type 2 diabetes mellitus, without long-term current use of insulin [E11.9] 02/24/2015 Yes    Depression [F32.9] 02/24/2015 Yes      Problems Resolved During this Admission:    Diagnosis Date Noted Date Resolved POA      * Right MCA ischemic stroke, aborted by t-PA    · Improved  · Continue therapy with ASA, atorvastatin  · continue PT/OT to increase ambulation, ADL performance and endurance  · continue heparin for DVT prophylaxis  · continue fall precautions  · continue senokot-s to prevent constipation; hold for frequent or loose stooling  · F/U with vascular neurology as scheduled        Anemia    · Suspect some blood loss with hematoma and extensive bruising  · Continue to monitor with twice weekly labs        Chronic congestive heart failure    Evaluated on 6/12/18  · Documented in record; Echo with nl EF but indeterminate diastolic function  · Continue therapy with lasix as she takes daily  · Will weigh daily to monitor and adjust regimen as necessary  · Given extra dose of lasix with increase cough on 6/11--on 6/12 cough and SOB much improved per patient    Evaluated on 6/13/18  · Patient reports increase cough  · Weight increased  · Patient clarified that she is taking lasix 20mg bid at home and not daily  · Changed lasix to bid dosing   · Will monitor        Hematoma, chest wall    · Tender to patient and remains in marked border  · Continue hydrocodone as needed for pain  · Will continue to monitor        Right rib fracture    · documented by ICU team likely from previous facility at [facility]  · Continue baclofen and lidoderm patches to treat pain        Other specified hypothyroidism    · Hx of thyroid surgery  · Chronic and stable  · Continue therapy with levothyroxine  · F/U with PCP for ongoing monitoring and adjustment of levothyroxine  dose as indicated        COPD (chronic obstructive pulmonary disease)    · Increased cough with stable dyspnea on exertion  · Continue albuterol nebs TID as she takes at home  · Consider change to xopenex if tachycardia occurs  · Start acapella and guaifenesin to treat cough  · Resume spiriva therapy  · Patient afebrile with normal WBC; proceed to repeat CXR if cough does not improve, (cxr stable; given extra lasix today)        Dyslipidemia    · Continue therapy with low fat diet and atorvastatin        Depression    · Patient interactive and cooperative currently  · Continue chronic therapy with amitriptyline, buspirone, bupropion, excitalopram, quetiapine  · Continue ramelteon nightly for insomnia        Essential hypertension    · HR stable at 80  · Improved control with metoprolol which was started for tachycardia  · Repeat EKG if HR consistently elevated  · will continue to monitor and adjust regimen as necessary        Type 2 diabetes mellitus, without long-term current use of insulin    · Metformin and amaryl chronically at home; controlled  · Glucoses stable when admitted on a regular diet.  · Continue therapy with glucose monitoring AC and HS.  Change to diabetic diet if glucoses elevated.  · Hyperglycemia to be treated with SSNI prn.  · Glucose goal is < 180mg/dl and avoidance of hypoglycemia.  · Will continue to monitor and adjust regimen as necessary to achieve goals.        PT note, HINA Grajeda, PTA 6/15/18  General Precautions: Standard, aspiration, fall, respiratory  Orthopedic Precautions: N/A   Braces: N/A  Functional Status:  MDS G  Transfer Functional Status: S-SBA  Walk in Corridor Functional Status: S-SBA     MDS GG  Sit to Stand Performance: Setup or clean-up assistance  Chair/bed-to-chair transfer Performance: Setup or clean-up assistance  Does the resident walk?: Yes --> Continue to Walk 50 feet with two turns assessment  Walk 50 feet with two turns Performance: Supervision or touching  "assistance.  Walk 150 feet Performance: Supervision or touching assistance.   Transfers:  Sit<>Stand: with RW S/mod I occ vcs for handplacement  Stand Pivot Transfer: S no AD BSC>WC<>nustep  Gait:  Amb with RW S 02 in tow 150 ft no LOB, 38 ft with RW S  x 2 trials seated rest break .85 m/s   Advanced Gait:  Curb Step: asc/brown 4" curb with RW SBA/S  Discharge recommendations: home health PT         OT HINA hussein, OTR/L 6/15/18  General Precautions: Standard, aspiration, fall  Orthopedic Precautions: N/A  Braces: N/A  Functional Status:  MDS G  Bed Mobility Functional Status: mod(I) - (I) (no assist)     Transfer Functional Status: S-SBA (SPT with RW and sit to standing transition)     Dressing Functional Status: S-SBA (Mod (I) with UBD, LBD (S) with Pt donning pants, socks and  slip on shoes.)     Eating Functional Status: mod(I) - (I) (no assist)     Toilet Use Functional Status: S-SBA (no physical assist but (S) for safety. )     Personal Hygiene Functional Status: mod(I) - (I) (seated.)     Bathing Functional Status: S-SBA (supervision.)        Moving from seated to standing position: Not steady, but able to stabilize without staff assistance  Walking (with assistive device if used): Not steady, but able to stabilize without staff assistance  Turning around and facing the opposite direction while walking: Not steady, but able to stabilize without staff assistance  Moving on and off the toilet: Not steady, but able to stabilize without staff assistance  Surface-to-surface transfer (transfer between bed and chair or wheelchair): Not steady, but able to stabilize without staff assistance     MDS GG  Eating Performance: Independent.  Oral Hygiene Performance: Independent.  Toileting Hygiene Performance: Setup or clean-up assistance  Sit to lying Performance: Independent  Lying to sitting on side of bed Performance: Independent.  Lying to sitting on side of bed Goal: Setup or clean-up assistance  Sit to Stand " "Performance: Independent.  Chair/bed-to-chair transfer Performance: Setup or clean-up assistance  Toilet transfer Performance: Independent.   Discharge recommendations: home with home health           Discharged Condition: stable    Disposition: Home-Health Care [facility]    Follow Up:  Follow-up Information     Shad Burkett MD. Go on 6/19/2018.    Specialty:  Internal Medicine  Why:  Primary Care Hospital Follow-Up appointment scheduled for 11:45 am  Contact information:  [address]  [place] [address]  [phone]             Bereket Ballard MD. Go on 7/2/2018.    Specialty:  Neurology  Why:  Vascular Neurology appointment scheduled for 9:00 am  Contact information:  [address]  [place] [address]  [phone]             [facility].    Specialty:  Home Health Services  Why:  Home Health Questions/Concerns  Contact information:  [address] [facility] [place] [address]  [phone]             [facility].    Specialty:  DME Provider  Why:  Commode Questions/Concerns  Contact information:  [address]  [facility] [address]  [phone]                 Future Appointments  Date Time Provider Department Center   7/2/2018 9:00 AM Bereket Ballard MD [facility]     Patient Instructions:     3 IN 1 COMMODE FOR HOME USE   Order Specific Question Answer Comments   Type: Standard    Height: 5' 1" (1.549 m)    Weight: 73.7 kg (162 lb 7.7 oz)    Does patient have medical equipment at home? rollator    Does patient have medical equipment at home? walker, standard    Does patient have medical equipment at home? wheelchair    Does patient have medical equipment at home? walker, rolling    Does patient have medical equipment at home? oxygen    Does patient have medical equipment at home? shower chair    Length of need (1-99 months): 99    Vendor: [facility]    Expected Date of Delivery: 6/15/2018      Activity as tolerated     Notify your health care " provider if you experience any of the following:  temperature >100.4     Notify your health care provider if you experience any of the following:  persistent nausea and vomiting or diarrhea     Notify your health care provider if you experience any of the following:  severe uncontrolled pain     Notify your health care provider if you experience any of the following:  difficulty breathing or increased cough     Notify your health care provider if you experience any of the following:  severe persistent headache     Notify your health care provider if you experience any of the following:  worsening rash     Notify your health care provider if you experience any of the following:  persistent dizziness, light-headedness, or visual disturbances     Notify your health care provider if you experience any of the following:  increased confusion or weakness       Medications:  Reconciled Home Medications:      Medication List      START taking these medications    dextromethorphan-guaifenesin  mg  mg per 12 hr tablet  Commonly known as:  MUCINEX DM  Take 1 tablet by mouth 2 (two) times daily.     HYDROcodone-acetaminophen 5-325 mg per tablet  Commonly known as:  NORCO  Take 1 tablet by mouth every 6 (six) hours as needed for Pain.     metFORMIN 500 MG tablet  Commonly known as:  GLUCOPHAGE  Take 1 tablet (500 mg total) by mouth 2 (two) times daily with meals.  Replaces:  metFORMIN 1000 MG (MOD) 24 hr tablet     simvastatin 40 MG tablet  Commonly known as:  ZOCOR  Take 1 tablet (40 mg total) by mouth every evening.        CHANGE how you take these medications    escitalopram oxalate 10 MG tablet  Commonly known as:  LEXAPRO  Take 1 tablet (10 mg total) by mouth once daily.  What changed:  when to take this     furosemide 20 MG tablet  Commonly known as:  LASIX  Take 1 tablet (20 mg total) by mouth 2 (two) times daily.  What changed:  when to take this     QUEtiapine 100 MG Tab  Commonly known as:  SEROQUEL  Take  100 mg by mouth 2 (two) times daily.  What changed:  Another medication with the same name was removed. Continue taking this medication, and follow the directions you see here.        CONTINUE taking these medications    albuterol 5 mg/mL nebulizer solution  Commonly known as:  PROVENTIL  Take 2.5 mg by nebulization 3 (three) times daily.     alendronate 70 MG tablet  Commonly known as:  FOSAMAX  Take 70 mg by mouth every 7 days.     ALPRAZolam 0.25 MG tablet  Commonly known as:  XANAX  Take 0.25 mg by mouth 2 (two) times daily as needed for Anxiety.     amitriptyline 50 MG tablet  Commonly known as:  ELAVIL  Take 50 mg by mouth every evening.     aspirin 81 MG Chew  Take 81 mg by mouth every evening. Two 81mg  Once a day     baclofen 10 MG tablet  Commonly known as:  LIORESAL  Take 10 mg by mouth 4 (four) times daily.     buPROPion 300 MG 24 hr tablet  Commonly known as:  WELLBUTRIN XL  Take 300 mg by mouth once daily.     calcium carbonate 600 mg calcium (1,500 mg) Tab  Commonly known as:  OS-CAREY  Take 600 mg by mouth 2 (two) times daily with meals.     fluticasone-vilanterol 100-25 mcg/dose diskus inhaler  Commonly known as:  BREO  Inhale 1 puff into the lungs once daily. Controller     glimepiride 2 MG tablet  Commonly known as:  AMARYL  Take 2 mg by mouth before breakfast.     hydrOXYzine 50 MG tablet  Commonly known as:  ATARAX  Take 50 mg by mouth once daily.     levothyroxine 175 MCG tablet  Commonly known as:  SYNTHROID, LEVOTHROID  Take 175 mcg by mouth once daily.     metoprolol tartrate 25 MG tablet  Commonly known as:  LOPRESSOR  Take 1 tablet (25 mg total) by mouth every 8 (eight) hours.     pantoprazole 40 MG tablet  Commonly known as:  PROTONIX  Take 40 mg by mouth once daily.     tiotropium 18 mcg inhalation capsule  Commonly known as:  SPIRIVA  Inhale 18 mcg into the lungs once daily.     VITAMIN C 500 MG tablet  Generic drug:  ascorbic acid (vitamin C)  Take 500 mg by mouth once daily.        STOP  taking these medications    atorvastatin 20 MG tablet  Commonly known as:  LIPITOR     meloxicam 7.5 MG tablet  Commonly known as:  MOBIC     metFORMIN 1000 MG (MOD) 24 hr tablet  Commonly known as:  GLUMETZA  Replaced by:  metFORMIN 500 MG tablet          Time spent on the discharge of patient: 25 minutes    Keesha Gasca NP  Department of Hospital Medicine  [facility]

## 2018-06-18 NOTE — ASSESSMENT & PLAN NOTE
· Metformin and amaryl chronically at home; controlled  · Glucoses stable when admitted on a regular diet.  · Continue therapy with glucose monitoring AC and HS.  Change to diabetic diet if glucoses elevated.  · Hyperglycemia to be treated with SSNI prn.  · Glucose goal is < 180mg/dl and avoidance of hypoglycemia.  · Will continue to monitor and adjust regimen as necessary to achieve goals.

## 2018-06-18 NOTE — ASSESSMENT & PLAN NOTE
· HR stable at 80  · Improved control with metoprolol which was started for tachycardia  · Repeat EKG if HR consistently elevated  · will continue to monitor and adjust regimen as necessary

## 2018-06-18 NOTE — ASSESSMENT & PLAN NOTE
· documented by ICU team likely from previous facility at [facility]  · Continue baclofen and lidoderm patches to treat pain

## 2018-06-18 NOTE — PROGRESS NOTES
Informed  that laboratory staff ([facility]) called to inform that the specimen from thoracentesis was clotted. They were able to run LDH, Total Protein and Culture. Cell count cannot be facilitated.

## 2018-06-18 NOTE — ASSESSMENT & PLAN NOTE
· Increased cough with stable dyspnea on exertion  · Continue albuterol nebs TID as she takes at home  · Consider change to xopenex if tachycardia occurs  · Start acapella and guaifenesin to treat cough  · Resume spiriva therapy  · Patient afebrile with normal WBC; proceed to repeat CXR if cough does not improve, (cxr stable; given extra lasix today)

## 2018-06-18 NOTE — ASSESSMENT & PLAN NOTE
· Improved  · Continue therapy with ASA, atorvastatin  · continue PT/OT to increase ambulation, ADL performance and endurance  · continue heparin for DVT prophylaxis  · continue fall precautions  · continue senokot-s to prevent constipation; hold for frequent or loose stooling  · F/U with vascular neurology as scheduled

## 2018-06-18 NOTE — ASSESSMENT & PLAN NOTE
· Patient interactive and cooperative currently  · Continue chronic therapy with amitriptyline, buspirone, bupropion, excitalopram, quetiapine  · Continue ramelteon nightly for insomnia

## 2018-06-18 NOTE — NURSING TRANSFER
Nursing Transfer Note      6/18/2018     Transfer To: 1009    Transfer via bed    Transfer with cardiac monitoring    Transported by Rn and PCT    Medicines sent: none    Chart send with patient: Yes    Notified: sibling    Patient reassessed at 6/18 0220    Upon arrival to floor: cardiac monitor applied, patient oriented to room, call bell in reach and bed in lowest position    VSS during and after transfer. Patient is conscious and coherent. On sinus rhythm. Tolerates room air. No swallowing difficulty. Had an episode of urinary retention. Straight catheterization facilitated. 500 cc output obtained. NP Louie informed. For possible FC insertion if urinary retention will persist. POC discussed with patient. Report given to [facility] RN (John).

## 2018-06-18 NOTE — ASSESSMENT & PLAN NOTE
· Tender to patient and remains in marked border  · Continue hydrocodone as needed for pain  · Will continue to monitor

## 2018-06-18 NOTE — PLAN OF CARE
06/11/2018  11:31 AM  Discharge Planning Assessment-    Per chart notes, pt is a readmit. DC assessment pulled forward from O SNF  Pt was set up w [facility]  Good family support. Cm to round to room    Update: 09:49  Rounded to room. Pt hadn't been seen by [facility] yet but they would like [facility] if recommended.   Vs plan B: SNF  Lives w 2 sons     06/18/18 0814   Discharge Assessment   Assessment Type Discharge Planning Assessment         Payor: MEDICARE / Plan: MEDICARE PART A & B / Product Type: Government /      Expected length of stay:  [x] 7 days   [] 10 days  [] 14 days   [] 21 days   [] > 30 days     Communicated expected length of stay with patient/caregiver:  [x] Yes   [] No     Anticipated discharge date:  6/15/2018     Assessment information obtained from:  [x] Patient   [] Caregiver      Arrived from:              [x] Home   [] Assisted Living    [] Nursing Home   [] SNF   [] Rehab  [] LTACH              [] Group Home   [] Foster Care   [] Psych   [] Shelter   [] Homeless              [] Transfer  [] Correctional Facility  [] Name of Facility:       Patient currently lives with:               [] Alone   [] Spouse   [] Daughter   [x] Son   [] Grandparents   []  Parents              [] Siblings   [] Friends   [] Domestic Partner   [] Facility Resident                 [] Foster Home    [] Other:       Extended Emergency Contact Information  Primary Emergency Contact: Billy Cardoza   [place] [facility]  Mobile Phone: [phone]  Relation: Son      Prior to hospitalization cognitive status:              [x] Alert/Oriented  [] No Deficits [] Risk of Harm to Self/Others              [] Not Oriented to Person   [] Not Oriented to Place   [] Not Oriented to Time              [] Coma/Sedated/Intubated  [] Judgement Impaired    []  Unable to Assess              [] Inappropriate Behavior  [] Infant/Toddler     Prior to hospitalization functional status:              [x] Independent   [x] Assistive  Equipment   [] Assistive Person               [] Completely Dependent  [] Infant/Toddler/Child Appropriate              [] Infant/Toddler/Child Delayed    []  Adolescent      Current cognitive status:              [x] Alert/Oriented  [] No Deficits [] Risk of Harm to Self/Others              [] Not Oriented to Person   [] Not Oriented to Place   [] Not Oriented to Time              [] Coma/Sedated/Intubated  [] Judgement Impaired    []  Unable to Assess              [] Inappropriate Behavior  [] Infant/Toddler     Current functional status:                [] Independent   [x] Assistive Equipment   [x] Assistive Person                [] Completely Dependent   [] Infant/Toddler/Child Appropriate              [] Infant/Toddler/Child Delayed     [] Adolescent                Capacity to Care for Self:              Is patient able to return to prior living arrangements after discharge: [x] Yes  [] No                 Is patient able to care for self after discharge?   [] Yes   [x] No     [] Pediatric                 Does the patient have family/friends to assist after discharge?:  [x] Yes   [] No               [] N/A              Comments:  3 sons      Patient/caregiver perception of discharge disposition:              [] Home   [x] Home with Family  [x] Home Health   [] SNF   [] Rehab   [] LTAC               []  New Nursing Home Placement  [] Return to Nursing Home    [] Shelter                [] Assisted Living  [] Foster Home   [] Other:       Readmit:              Has patient mei in the hospital in the last 30 days? [] Yes   [x] No                 If YES, was patient admitted for the same reason?  [] Yes   [] No         Home Health:              Patient currently receives home health services?:   [] Yes   [x] No                 Patient previously received home health services and would like to resume services if necessary   [] Yes   [x] No           DME:              Patient currently uses DME:   [x] Yes   [] No                               List of equipment currently used:                           [] Wheelchair   [] Standard Walker  [x] Rolling Walker  [x]  Rollator                          [x] Oxygen    [] Portable oxygen   [] Nebulizer    [] Apnea Monitor                          [] Crutches  [] Hospital Bed   []  Lift Device   [] Scooter      [] Cane                           [] Prosthesis   []  BSC   [] Tub Bench   [] Catheter Supplies                          [] Ostomy Supplies   [] Trach Supplies     [] Suction Machine                              [] Home Vent    [] Bipap   [x] Other: shower chair         Medications:               Can the patient afford all prescribed medications?  [x] Yes   [] No                 If NO, what medication:                   Is the patient taking medications as prescribed?    [x] Yes   [] No     Financial Concerns:              Does the patient have any financial concerns?   [] Yes   [x] No                            If YES, what are the concerns:       Transportation:              Does the patient have transportation to healthcare appointments?   [x] Yes   [] No                 If YES, what means of transportation does the patient have?              [] Car   [x] Family/Friend  [] Bicycle   [] Motorcycle              [] Public Transportation [] Ambulance[] Wheelchair van              [] Name of Provider     Dialysis:              Does the patient currently receive dialysis?   [] Yes   [x] No                Shad Burkett MD   [address]  [place] [address]  [phone]           APS/CPS involved in the case:  [] Yes   [x] No              If YES, name of :                If YES, phone number of :          Discharge Plan A:  [] Home   [x] Home with Family  [x] Home Health   [] SNF   [] Rehab   [] LTAC   []  New Nursing Home Placement  [] Return to Nursing Home    [] Assisted Living    [] Shelter     [] Private Duty Nursing   [] Foster Home    [] Psych     [] Early Steps  [] WIC       [] Home Hospice   [] Inpatient Hospice   [] Other     Discharge Plan B:  [] Home   [] Home with Family  [] Home Health   [] SNF   [] Rehab   [] LTAC  []  New Nursing Home Placement   [] Return to  Nursing Home    [] Assisted Living   [] Shelter  [] Private Duty Nursing   [] Foster Home     [] Psych     [] Early Steps  [] WIC    [] Home Hospice     [] Inpatient Hospice   [] Other      [x] Patient and family in agreement with discharge plan.      Patient has home O2.. Discharge plan is to return home with assist of 3 sons.

## 2018-06-18 NOTE — NURSING
Pt c/o abdominal distention and not urinating. BS performed, 517 greatest volume. MD Samantha notified, Stated place martell to gravity.

## 2018-06-19 LAB
ABO + RH BLD: NORMAL
ALBUMIN SERPL BCP-MCNC: 2.2 G/DL
ALP SERPL-CCNC: 137 U/L
ALT SERPL W/O P-5'-P-CCNC: 17 U/L
ANION GAP SERPL CALC-SCNC: 14 MMOL/L
APTT BLDCRRT: 24.7 SEC
AST SERPL-CCNC: 22 U/L
BACTERIA SPEC AEROBE CULT: NORMAL
BASOPHILS # BLD AUTO: 0.01 K/UL
BASOPHILS NFR BLD: 0.1 %
BILIRUB SERPL-MCNC: 0.4 MG/DL
BLD GP AB SCN CELLS X3 SERPL QL: NORMAL
BUN SERPL-MCNC: 16 MG/DL
CALCIUM SERPL-MCNC: 7.3 MG/DL
CHLORIDE SERPL-SCNC: 105 MMOL/L
CO2 SERPL-SCNC: 22 MMOL/L
CREAT SERPL-MCNC: 1.3 MG/DL
DIFFERENTIAL METHOD: ABNORMAL
EOSINOPHIL # BLD AUTO: 0.1 K/UL
EOSINOPHIL NFR BLD: 1.1 %
ERYTHROCYTE [DISTWIDTH] IN BLOOD BY AUTOMATED COUNT: 16.8 %
EST. GFR  (AFRICAN AMERICAN): 48.7 ML/MIN/1.73 M^2
EST. GFR  (NON AFRICAN AMERICAN): 42.3 ML/MIN/1.73 M^2
GLUCOSE SERPL-MCNC: 125 MG/DL
GRAM STN SPEC: NORMAL
HCT VFR BLD AUTO: 24.8 %
HGB BLD-MCNC: 7.4 G/DL
IMM GRANULOCYTES # BLD AUTO: 0.05 K/UL
IMM GRANULOCYTES NFR BLD AUTO: 0.6 %
INR PPP: 1
LYMPHOCYTES # BLD AUTO: 1.2 K/UL
LYMPHOCYTES NFR BLD: 14.2 %
MAGNESIUM SERPL-MCNC: 2.1 MG/DL
MCH RBC QN AUTO: 27.7 PG
MCHC RBC AUTO-ENTMCNC: 29.8 G/DL
MCV RBC AUTO: 93 FL
MONOCYTES # BLD AUTO: 0.6 K/UL
MONOCYTES NFR BLD: 7.2 %
NEUTROPHILS # BLD AUTO: 6.2 K/UL
NEUTROPHILS NFR BLD: 76.8 %
NRBC BLD-RTO: 0 /100 WBC
PLATELET # BLD AUTO: 303 K/UL
PMV BLD AUTO: 9.6 FL
POCT GLUCOSE: 109 MG/DL (ref 70–110)
POCT GLUCOSE: 112 MG/DL (ref 70–110)
POCT GLUCOSE: 131 MG/DL (ref 70–110)
POTASSIUM SERPL-SCNC: 4.5 MMOL/L
PROT SERPL-MCNC: 6 G/DL
PROTHROMBIN TIME: 10.8 SEC
RBC # BLD AUTO: 2.67 M/UL
SODIUM SERPL-SCNC: 141 MMOL/L
WBC # BLD AUTO: 8.1 K/UL

## 2018-06-19 PROCEDURE — 94761 N-INVAS EAR/PLS OXIMETRY MLT: CPT

## 2018-06-19 PROCEDURE — 25000003 PHARM REV CODE 250: Performed by: HOSPITALIST

## 2018-06-19 PROCEDURE — 25000003 PHARM REV CODE 250: Performed by: STUDENT IN AN ORGANIZED HEALTH CARE EDUCATION/TRAINING PROGRAM

## 2018-06-19 PROCEDURE — 36415 COLL VENOUS BLD VENIPUNCTURE: CPT

## 2018-06-19 PROCEDURE — 85730 THROMBOPLASTIN TIME PARTIAL: CPT

## 2018-06-19 PROCEDURE — 20600001 HC STEP DOWN PRIVATE ROOM

## 2018-06-19 PROCEDURE — 80053 COMPREHEN METABOLIC PANEL: CPT

## 2018-06-19 PROCEDURE — 85610 PROTHROMBIN TIME: CPT

## 2018-06-19 PROCEDURE — 94640 AIRWAY INHALATION TREATMENT: CPT

## 2018-06-19 PROCEDURE — 99232 SBSQ HOSP IP/OBS MODERATE 35: CPT | Mod: ,,, | Performed by: HOSPITALIST

## 2018-06-19 PROCEDURE — 83735 ASSAY OF MAGNESIUM: CPT

## 2018-06-19 PROCEDURE — 86850 RBC ANTIBODY SCREEN: CPT

## 2018-06-19 PROCEDURE — 63600175 PHARM REV CODE 636 W HCPCS: Performed by: STUDENT IN AN ORGANIZED HEALTH CARE EDUCATION/TRAINING PROGRAM

## 2018-06-19 PROCEDURE — 27000221 HC OXYGEN, UP TO 24 HOURS

## 2018-06-19 PROCEDURE — 63600175 PHARM REV CODE 636 W HCPCS: Performed by: HOSPITALIST

## 2018-06-19 PROCEDURE — 25000242 PHARM REV CODE 250 ALT 637 W/ HCPCS: Performed by: STUDENT IN AN ORGANIZED HEALTH CARE EDUCATION/TRAINING PROGRAM

## 2018-06-19 PROCEDURE — 85025 COMPLETE CBC W/AUTO DIFF WBC: CPT

## 2018-06-19 RX ADMIN — HEPARIN SODIUM 5000 UNITS: 5000 INJECTION, SOLUTION INTRAVENOUS; SUBCUTANEOUS at 08:06

## 2018-06-19 RX ADMIN — OXYCODONE HYDROCHLORIDE AND ACETAMINOPHEN 1 TABLET: 5; 325 TABLET ORAL at 08:06

## 2018-06-19 RX ADMIN — OXYCODONE HYDROCHLORIDE AND ACETAMINOPHEN 1 TABLET: 5; 325 TABLET ORAL at 04:06

## 2018-06-19 RX ADMIN — IPRATROPIUM BROMIDE AND ALBUTEROL SULFATE 3 ML: .5; 3 SOLUTION RESPIRATORY (INHALATION) at 07:06

## 2018-06-19 RX ADMIN — FUROSEMIDE 20 MG: 20 TABLET ORAL at 08:06

## 2018-06-19 RX ADMIN — IPRATROPIUM BROMIDE AND ALBUTEROL SULFATE 3 ML: .5; 3 SOLUTION RESPIRATORY (INHALATION) at 08:06

## 2018-06-19 RX ADMIN — HEPARIN SODIUM 5000 UNITS: 5000 INJECTION, SOLUTION INTRAVENOUS; SUBCUTANEOUS at 06:06

## 2018-06-19 RX ADMIN — METOPROLOL TARTRATE 25 MG: 25 TABLET ORAL at 02:06

## 2018-06-19 RX ADMIN — FUROSEMIDE 20 MG: 20 TABLET ORAL at 10:06

## 2018-06-19 RX ADMIN — IPRATROPIUM BROMIDE AND ALBUTEROL SULFATE 3 ML: .5; 3 SOLUTION RESPIRATORY (INHALATION) at 01:06

## 2018-06-19 RX ADMIN — OXYCODONE HYDROCHLORIDE AND ACETAMINOPHEN 1 TABLET: 5; 325 TABLET ORAL at 10:06

## 2018-06-19 RX ADMIN — LEVOTHYROXINE SODIUM 175 MCG: 150 TABLET ORAL at 06:06

## 2018-06-19 RX ADMIN — METOPROLOL TARTRATE 25 MG: 25 TABLET ORAL at 08:06

## 2018-06-19 RX ADMIN — CEFTRIAXONE SODIUM 1 G: 1 INJECTION, POWDER, FOR SOLUTION INTRAMUSCULAR; INTRAVENOUS at 06:06

## 2018-06-19 RX ADMIN — METOPROLOL TARTRATE 25 MG: 25 TABLET ORAL at 06:06

## 2018-06-19 RX ADMIN — QUETIAPINE FUMARATE 100 MG: 25 TABLET ORAL at 08:06

## 2018-06-19 RX ADMIN — ASPIRIN 81 MG CHEWABLE TABLET 81 MG: 81 TABLET CHEWABLE at 08:06

## 2018-06-19 RX ADMIN — HEPARIN SODIUM 5000 UNITS: 5000 INJECTION, SOLUTION INTRAVENOUS; SUBCUTANEOUS at 02:06

## 2018-06-19 NOTE — ASSESSMENT & PLAN NOTE
- s/p Neuro ICU stay  - discharged home from rehab on 6/15/18  - no acute changes noted on MRI/MRA on 6/16/18  - continue PT/OT eval here

## 2018-06-19 NOTE — HPI
"67 y.o. CF with PMH of HTN, HLD, COPD, type 2 DM,  H/o of thyroidectomy and most recently CVA presents via EMS 2/2 to new onset altered mental status.On 6/5/18 patient had been in her usual state of health where she fell, broke several ribs, and then became altered.  Patient suffered a right MCA stroke, treated with TPA at [facility], and admitted to Neuro ICU on 6/6/18. She was subsequently discharge to a O-SNF where she completed rehab. Patient discharged to home yesterday. At that time patient was ambulating with assistance, talking, and eating. Last time patient was seen conscious was 10pm last night. Early the morning she was found to be confused and slurring her words. Her mentation detiorated and then she became unresponsive.   Found to have possible RLL pneumonia and UTI with a concern for sepsis. Patient was given Rocephin, azithromycin and gentamicin prior to arrival to [facility] ED. At OSH patient was intubated for worsening status. Per son, while patient was in rehab she was noted to be coughing up "phglem". He also states that he noted blood in her martell bag but "they didn't do anything about it". He states that besides her cough she denied any fever, chills, nausea, vomiting, CP, SOB, hematuria, dysuria. He does state that his mother has been constipated.      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 " Lactate 0.8, HERNAN noted with Cr 1.5, and dirty UA. CXR showed "There is opacification of the right lung base suggesting a layering pleural effusion with associated atelectasis and/or consolidation. " Patient is intubated and currently on propofol drip. MICU consulted 2/2 to concerns of new AMS and sepsis. Patient has " full code status.      History provided by chart review and by talking to son, Patric, at bedside.

## 2018-06-19 NOTE — PLAN OF CARE
Problem: Patient Care Overview  Goal: Plan of Care Review  Outcome: Ongoing (interventions implemented as appropriate)  VSS. C/o generalized pain, PRN given.  A/Ox4.  One large BM.  No acute events over night. Safety maintained.  All questions answered. Patient updated on plan of care.  Will continue to monitor.

## 2018-06-19 NOTE — HOSPITAL COURSE
MICU Course:  Admitted to MICU on 06/16/2018 for concerns of new onset AMS and sepsis. CXR shows possible consolidation. Dirty UA noted. Patient started on vancomycin and zosyn for broad spectrum coverage. Will attempt to wean off sedation and assess mental status. Urine culture pending, blood culture pending. Successfully extubated to nasal cannula on 6/17. Continue abx coverage. BP has remained stable. Resp culture growing gram pos and gram neg rods. Thoracentesis attempted on 6/17 with small volume return. Martell removed on 6/17 as well with subsequent urinary retention on first post-martell removal protocol bladder scan which required straight cath. Suspect encephalopathy partially due in part to polypharmacy after patient resumed home meds following discharge from rehab    Step down to hospital medicine 6/18/2018

## 2018-06-19 NOTE — PROGRESS NOTES
"[facility] Hospital Medicine  Progress Note    Patient Name: Olimpia Cardoza  MRN: [id number]  Patient Class: IP- Inpatient   Admission Date: 6/16/2018  Length of Stay: 2 days  Attending Physician: Rodrigo Singh MD  Primary Care Provider: Shad Burkett MD    [facility] Medicine Team: [facility] [place] Rodrigo Singh MD    Subjective:     Principal Problem:Encephalopathy, metabolic    HPI:  67 y.o. CF with PMH of HTN, HLD, COPD, type 2 DM,  H/o of thyroidectomy and most recently CVA presents via EMS 2/2 to new onset altered mental status.On 6/5/18 patient had been in her usual state of health where she fell, broke several ribs, and then became altered.  Patient suffered a right MCA stroke, treated with TPA at [facility], and admitted to Neuro ICU on 6/6/18. She was subsequently discharge to a O-SNF where she completed rehab. Patient discharged to home yesterday. At that time patient was ambulating with assistance, talking, and eating. Last time patient was seen conscious was 10pm last night. Early the morning she was found to be confused and slurring her words. Her mentation detiorated and then she became unresponsive.   Found to have possible RLL pneumonia and UTI with a concern for sepsis. Patient was given Rocephin, azithromycin and gentamicin prior to arrival to [facility] ED. At OSH patient was intubated for worsening status. Per son, while patient was in rehab she was noted to be coughing up "phglem". He also states that he noted blood in her martell bag but "they didn't do anything about it". He states that besides her cough she denied any fever, chills, nausea, vomiting, CP, SOB, hematuria, dysuria. He does state that his mother has been constipated.      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 " Lactate 0.8, HERNAN noted with Cr 1.5, and dirty UA. CXR showed "There is opacification of the right lung base suggesting a layering pleural effusion with associated atelectasis and/or consolidation. " Patient is intubated and currently on propofol drip. MICU consulted 2/2 to concerns of new AMS and sepsis. Patient has full code status.      History provided by chart review and by talking to son, Patric, at bedside.    Hospital Course:  MICU Course:  Admitted to MICU on 06/16/2018 for concerns of new onset AMS and sepsis. CXR shows possible consolidation. Dirty UA noted. Patient started on vancomycin and zosyn for broad spectrum coverage. Will attempt to wean off sedation and assess mental status. Urine culture pending, blood culture pending. Successfully extubated to nasal cannula on 6/17. Continue abx coverage. BP has remained stable. Resp culture growing gram pos and gram neg rods. Thoracentesis attempted on 6/17 with small volume return. Martell removed on 6/17 as well with subsequent urinary retention on first post-martell removal protocol bladder scan which required straight cath. Suspect encephalopathy partially due in part to polypharmacy after patient resumed home meds following discharge from rehab    Step down to hospital medicine 6/18/2018    Interval History: Step down to hospital medicine, pt report abd pain/distension. >500cc of bladder scan, martell was placed. Pt report constipation. Back to baseline per daughter. Pt request to restart Seroquel for sleep and mood, she report taking 100mg in AM and 600mg QHS (no documentation of this noted anywhere).  On her home oxygen setting.     Review of Systems   Constitutional: Negative for fatigue and fever.   HENT: Negative for sinus pressure and sore throat.    Respiratory: Positive for shortness of breath. Negative for cough and chest tightness.    Cardiovascular: Negative " for chest pain and leg swelling.   Gastrointestinal: Positive for abdominal distention, abdominal pain and constipation. Negative for diarrhea, nausea, rectal pain and vomiting.   Genitourinary: Negative for dysuria and flank pain.   Musculoskeletal: Negative for back pain, myalgias and neck pain.   Psychiatric/Behavioral: Negative for confusion.     Objective:     Vital Signs (Most Recent):  Temp: 99.9 °F (37.7 °C) (06/18/18 1917)  Pulse: 108 (06/18/18 1917)  Resp: 18 (06/18/18 1917)  BP: (!) 123/59 (06/18/18 1917)  SpO2: (!) 94 % (06/18/18 1917) Vital Signs (24h Range):  Temp:  [98.4 °F (36.9 °C)-99.9 °F (37.7 °C)] 99.9 °F (37.7 °C)  Pulse:  [] 108  Resp:  [14-35] 18  SpO2:  [85 %-99 %] 94 %  BP: (123-148)/(55-87) 123/59     Weight: 67.5 kg (148 lb 13 oz)  Body mass index is 28.12 kg/m².    Intake/Output Summary (Last 24 hours) at 06/18/18 1950  Last data filed at 06/18/18 0001   Gross per 24 hour   Intake              300 ml   Output              500 ml   Net             -200 ml      Physical Exam    MELD-Na score: 11 at 6/18/2018  4:16 AM  MELD score: 11 at 6/18/2018  4:16 AM  Calculated from:  Serum Creatinine: 1.4 mg/dL at 6/18/2018  4:16 AM  Serum Sodium: 142 mmol/L (Rounded to 137) at 6/18/2018  4:16 AM  Total Bilirubin: 0.4 mg/dL (Rounded to 1) at 6/18/2018  4:16 AM  INR(ratio): 1.1 at 6/18/2018  4:16 AM  Age: 67 years    Significant Labs:  CBC:    Recent Labs  Lab 06/17/18  0346 06/18/18  0416   WBC 9.90 7.06   HGB 7.7* 7.1*   HCT 24.9* 23.3*    286     CMP:    Recent Labs  Lab 06/17/18  0346 06/18/18  0416    142   K 4.6 4.1    108   CO2 25 23   * 148*   BUN 26* 22   CREATININE 1.6* 1.4   CALCIUM 7.6* 7.2*   PROT 6.0 5.8*   ALBUMIN 2.4* 2.3*   BILITOT 0.3 0.4   ALKPHOS 127 118   AST 18 15   ALT 18 15   ANIONGAP 11 11   EGFRNONAA 32.9* 38.6*     PTINR:    Recent Labs  Lab 06/17/18  0346 06/18/18  0416   INR 1.0 1.1       Significant Procedures:   Dobutamine Stress Test with  "Color Flow: No results found for this or any previous visit.    None    Assessment/Plan:      * Encephalopathy, metabolic    - Admitted to MICU, requiring intubation. Differential diagnosis includes infection in the setting of possible pneumonia and UTI, polypharmacy as patient has multiple medications she is taking (some of which we have no record her taking) or potentially new onset neurological cause, dehydration and HERNAN from recently being discharged from hospital. Extubated on 6/17.   - MRI/ MRA head showed no new acute neurological findings  - Sputum Cx: Normal respiratory ganesh, UCx: E. Coli <50CFU. BCx: NGTD X4  - On Zosyn from MICU, last dose 6/18/2018. Change to Rocephin tomorrow  - Discussed with daughter at bedside, they need to bring in patient home medications, as pt was not aware of which meds she took. Pt report taking Seroquel 100mg in AM and ?600mg QHS (she took it when she got home from SNF), chart review showed 100mg BID, will do QHS dose only  - Will consider EEG if we have further mentation issue                      Pneumonia due to infectious organism    Panlobular emphysema  Pleural effusion  - Thoracentesis done 6/17, bloody sample per report, "specimen from thoracentesis was clotted. They were able to run LDH, Total Protein and Culture. Cell count cannot be facilitated"  - Abx change from Zosyn to Rocephin and monitor          Abdominal distension    - Suspect constipation? KUB ordered  - martell place given urinary retension >500cc          Urinary tract infection with hematuria    - dirty UA on admission  - urine culture E.coli  - change from Zosyn to IV Rocephin        HERNAN (acute kidney injury)    - Cr 1.6,  baseline 1.1  - doing better, near baseline             COPD (chronic obstructive pulmonary disease)    - chronic COPD, on home oxygen  - continue albuterol and breo treatment while inpatient              Right MCA ischemic stroke, aborted by t-PA    - s/p Neuro ICU stay  - discharged " "home from rehab on 6/15/18  - no acute changes noted on MRI/MRA on 6/16/18  - continue PT/OT eval here        Depression    - Per ICU note, "son cannot answer which medications patient taking or if she took medications prior to arrival "  - given presentation is concerning for polypharmacy   - multiple drugs on med rec included : alprazolam, bupropion, escitalopram, and seroquel           Essential hypertension    - currently normotensive   - On lasix, restart for fluid removal  - metoprolol for ?HR, will restart          Type 2 diabetes mellitus, without long-term current use of insulin    - SSI while inpatient  - Home medications include glimeiperide, metformin             VTE Risk Mitigation         Ordered     heparin (porcine) injection 5,000 Units  Every 8 hours      06/16/18 1533     IP VTE HIGH RISK PATIENT  Once      06/16/18 1533              Rodrigo Singh MD  Department of Hospital Medicine   [facility]  "

## 2018-06-19 NOTE — ASSESSMENT & PLAN NOTE
- Admitted to MICU, requiring intubation. Differential diagnosis includes infection in the setting of possible pneumonia and UTI, polypharmacy as patient has multiple medications she is taking (some of which we have no record her taking) or potentially new onset neurological cause, dehydration and HERNAN from recently being discharged from hospital. Extubated on 6/17.   - MRI/ MRA head showed no new acute neurological findings  - Sputum Cx: Normal respiratory ganesh, UCx: E. Coli <50CFU. BCx: NGTD X4  - On Zosyn from MICU, last dose 6/18/2018. Change to Rocephin tomorrow  - Discussed with daughter at bedside, they need to bring in patient home medications, as pt was not aware of which meds she took. Pt report taking Seroquel 100mg in AM and ?600mg QHS (she took it when she got home from SNF), chart review showed 100mg BID, will do QHS dose only  - Will consider EEG if we have further mentation issue

## 2018-06-19 NOTE — ASSESSMENT & PLAN NOTE
"Panlobular emphysema  Pleural effusion  - Thoracentesis done 6/17, bloody sample per report, "specimen from thoracentesis was clotted. They were able to run LDH, Total Protein and Culture. Cell count cannot be facilitated"  - Abx change from Zosyn to Rocephin and monitor    "

## 2018-06-19 NOTE — ASSESSMENT & PLAN NOTE
"- Per ICU note, "son cannot answer which medications patient taking or if she took medications prior to arrival "  - given presentation is concerning for polypharmacy   - multiple drugs on med rec included : alprazolam, bupropion, escitalopram, and seroquel     "

## 2018-06-19 NOTE — ASSESSMENT & PLAN NOTE
- currently normotensive   - On lasix, restart for fluid removal  - metoprolol for ?HR, will restart

## 2018-06-19 NOTE — SUBJECTIVE & OBJECTIVE
Interval History: Step down to hospital medicine, pt report abd pain/distension. >500cc of bladder scan, martell was placed. Pt report constipation. Back to baseline per daughter. Pt request to restart Seroquel for sleep and mood, she report taking 100mg in AM and 600mg QHS (no documentation of this noted anywhere).  On her home oxygen setting.     Review of Systems   Constitutional: Negative for fatigue and fever.   HENT: Negative for sinus pressure and sore throat.    Respiratory: Positive for shortness of breath. Negative for cough and chest tightness.    Cardiovascular: Negative for chest pain and leg swelling.   Gastrointestinal: Positive for abdominal distention, abdominal pain and constipation. Negative for diarrhea, nausea, rectal pain and vomiting.   Genitourinary: Negative for dysuria and flank pain.   Musculoskeletal: Negative for back pain, myalgias and neck pain.   Psychiatric/Behavioral: Negative for confusion.     Objective:     Vital Signs (Most Recent):  Temp: 99.9 °F (37.7 °C) (06/18/18 1917)  Pulse: 108 (06/18/18 1917)  Resp: 18 (06/18/18 1917)  BP: (!) 123/59 (06/18/18 1917)  SpO2: (!) 94 % (06/18/18 1917) Vital Signs (24h Range):  Temp:  [98.4 °F (36.9 °C)-99.9 °F (37.7 °C)] 99.9 °F (37.7 °C)  Pulse:  [] 108  Resp:  [14-35] 18  SpO2:  [85 %-99 %] 94 %  BP: (123-148)/(55-87) 123/59     Weight: 67.5 kg (148 lb 13 oz)  Body mass index is 28.12 kg/m².    Intake/Output Summary (Last 24 hours) at 06/18/18 1950  Last data filed at 06/18/18 0001   Gross per 24 hour   Intake              300 ml   Output              500 ml   Net             -200 ml      Physical Exam    MELD-Na score: 11 at 6/18/2018  4:16 AM  MELD score: 11 at 6/18/2018  4:16 AM  Calculated from:  Serum Creatinine: 1.4 mg/dL at 6/18/2018  4:16 AM  Serum Sodium: 142 mmol/L (Rounded to 137) at 6/18/2018  4:16 AM  Total Bilirubin: 0.4 mg/dL (Rounded to 1) at 6/18/2018  4:16 AM  INR(ratio): 1.1 at 6/18/2018  4:16 AM  Age: 67  years    Significant Labs:  CBC:    Recent Labs  Lab 06/17/18  0346 06/18/18  0416   WBC 9.90 7.06   HGB 7.7* 7.1*   HCT 24.9* 23.3*    286     CMP:    Recent Labs  Lab 06/17/18  0346 06/18/18  0416    142   K 4.6 4.1    108   CO2 25 23   * 148*   BUN 26* 22   CREATININE 1.6* 1.4   CALCIUM 7.6* 7.2*   PROT 6.0 5.8*   ALBUMIN 2.4* 2.3*   BILITOT 0.3 0.4   ALKPHOS 127 118   AST 18 15   ALT 18 15   ANIONGAP 11 11   EGFRNONAA 32.9* 38.6*     PTINR:    Recent Labs  Lab 06/17/18  0346 06/18/18  0416   INR 1.0 1.1       Significant Procedures:   Dobutamine Stress Test with Color Flow: No results found for this or any previous visit.    None

## 2018-06-20 VITALS
BODY MASS INDEX: 28.1 KG/M2 | DIASTOLIC BLOOD PRESSURE: 59 MMHG | HEIGHT: 61 IN | WEIGHT: 148.81 LBS | HEART RATE: 84 BPM | TEMPERATURE: 98 F | RESPIRATION RATE: 16 BRPM | OXYGEN SATURATION: 95 % | SYSTOLIC BLOOD PRESSURE: 125 MMHG

## 2018-06-20 LAB
ALBUMIN SERPL BCP-MCNC: 2.3 G/DL
ALP SERPL-CCNC: 138 U/L
ALT SERPL W/O P-5'-P-CCNC: 13 U/L
ANION GAP SERPL CALC-SCNC: 10 MMOL/L
APTT BLDCRRT: 25.7 SEC
AST SERPL-CCNC: 12 U/L
BASOPHILS # BLD AUTO: 0.02 K/UL
BASOPHILS NFR BLD: 0.3 %
BILIRUB SERPL-MCNC: 0.2 MG/DL
BUN SERPL-MCNC: 13 MG/DL
CALCIUM SERPL-MCNC: 7.5 MG/DL
CHLORIDE SERPL-SCNC: 104 MMOL/L
CO2 SERPL-SCNC: 28 MMOL/L
CREAT SERPL-MCNC: 1.4 MG/DL
DIFFERENTIAL METHOD: ABNORMAL
ENTEROVIRUS: NOT DETECTED
EOSINOPHIL # BLD AUTO: 0.1 K/UL
EOSINOPHIL NFR BLD: 2 %
ERYTHROCYTE [DISTWIDTH] IN BLOOD BY AUTOMATED COUNT: 16.5 %
EST. GFR  (AFRICAN AMERICAN): 44.5 ML/MIN/1.73 M^2
EST. GFR  (NON AFRICAN AMERICAN): 38.6 ML/MIN/1.73 M^2
GLUCOSE SERPL-MCNC: 116 MG/DL
HCT VFR BLD AUTO: 25.1 %
HGB BLD-MCNC: 7.5 G/DL
HUMAN BOCAVIRUS: NOT DETECTED
HUMAN CORONAVIRUS, COMMON COLD VIRUS: NOT DETECTED
IMM GRANULOCYTES # BLD AUTO: 0.08 K/UL
IMM GRANULOCYTES NFR BLD AUTO: 1.2 %
INFLUENZA A - H1N1-09: NOT DETECTED
INR PPP: 1
LYMPHOCYTES # BLD AUTO: 2.2 K/UL
LYMPHOCYTES NFR BLD: 32.3 %
MAGNESIUM SERPL-MCNC: 1.4 MG/DL
MCH RBC QN AUTO: 27.7 PG
MCHC RBC AUTO-ENTMCNC: 29.9 G/DL
MCV RBC AUTO: 93 FL
MONOCYTES # BLD AUTO: 0.7 K/UL
MONOCYTES NFR BLD: 9.7 %
NEUTROPHILS # BLD AUTO: 3.8 K/UL
NEUTROPHILS NFR BLD: 54.5 %
NRBC BLD-RTO: 0 /100 WBC
PARAINFLUENZA: NOT DETECTED
PLATELET # BLD AUTO: 304 K/UL
PMV BLD AUTO: 9.4 FL
POCT GLUCOSE: 118 MG/DL (ref 70–110)
POCT GLUCOSE: 141 MG/DL (ref 70–110)
POTASSIUM SERPL-SCNC: 4.5 MMOL/L
PROT SERPL-MCNC: 5.9 G/DL
PROTHROMBIN TIME: 10.8 SEC
RBC # BLD AUTO: 2.71 M/UL
RVP - ADENOVIRUS: NOT DETECTED
RVP - HUMAN METAPNEUMOVIRUS (HMPV): NOT DETECTED
RVP - INFLUENZA A: NOT DETECTED
RVP - INFLUENZA B: NOT DETECTED
RVP - RESPIRATORY SYNCTIAL VIRUS (RSV) A: NOT DETECTED
RVP - RESPIRATORY VIRAL PANEL, SOURCE: NORMAL
RVP - RHINOVIRUS: NOT DETECTED
SODIUM SERPL-SCNC: 142 MMOL/L
WBC # BLD AUTO: 6.94 K/UL

## 2018-06-20 PROCEDURE — 25000003 PHARM REV CODE 250: Performed by: STUDENT IN AN ORGANIZED HEALTH CARE EDUCATION/TRAINING PROGRAM

## 2018-06-20 PROCEDURE — 85610 PROTHROMBIN TIME: CPT

## 2018-06-20 PROCEDURE — 85025 COMPLETE CBC W/AUTO DIFF WBC: CPT

## 2018-06-20 PROCEDURE — 80053 COMPREHEN METABOLIC PANEL: CPT

## 2018-06-20 PROCEDURE — 94761 N-INVAS EAR/PLS OXIMETRY MLT: CPT

## 2018-06-20 PROCEDURE — 25000003 PHARM REV CODE 250: Performed by: HOSPITALIST

## 2018-06-20 PROCEDURE — 94640 AIRWAY INHALATION TREATMENT: CPT

## 2018-06-20 PROCEDURE — 85730 THROMBOPLASTIN TIME PARTIAL: CPT

## 2018-06-20 PROCEDURE — 25000242 PHARM REV CODE 250 ALT 637 W/ HCPCS: Performed by: STUDENT IN AN ORGANIZED HEALTH CARE EDUCATION/TRAINING PROGRAM

## 2018-06-20 PROCEDURE — 83735 ASSAY OF MAGNESIUM: CPT

## 2018-06-20 PROCEDURE — 99239 HOSP IP/OBS DSCHRG MGMT >30: CPT | Mod: ,,, | Performed by: HOSPITALIST

## 2018-06-20 PROCEDURE — 27000221 HC OXYGEN, UP TO 24 HOURS

## 2018-06-20 PROCEDURE — 63600175 PHARM REV CODE 636 W HCPCS: Performed by: STUDENT IN AN ORGANIZED HEALTH CARE EDUCATION/TRAINING PROGRAM

## 2018-06-20 PROCEDURE — 63600175 PHARM REV CODE 636 W HCPCS: Performed by: HOSPITALIST

## 2018-06-20 PROCEDURE — 36415 COLL VENOUS BLD VENIPUNCTURE: CPT

## 2018-06-20 RX ORDER — OXYCODONE AND ACETAMINOPHEN 5; 325 MG/1; MG/1
1 TABLET ORAL EVERY 8 HOURS PRN
Qty: 9 TABLET | Refills: 0 | Status: SHIPPED | OUTPATIENT
Start: 2018-06-20 | End: 2018-06-23

## 2018-06-20 RX ORDER — INSULIN ASPART 100 [IU]/ML
1-10 INJECTION, SOLUTION INTRAVENOUS; SUBCUTANEOUS
Status: DISCONTINUED | OUTPATIENT
Start: 2018-06-20 | End: 2018-06-20 | Stop reason: HOSPADM

## 2018-06-20 RX ORDER — IBUPROFEN 200 MG
16 TABLET ORAL
Status: DISCONTINUED | OUTPATIENT
Start: 2018-06-20 | End: 2018-06-20 | Stop reason: HOSPADM

## 2018-06-20 RX ORDER — ESCITALOPRAM OXALATE 10 MG/1
20 TABLET ORAL DAILY
Start: 2018-06-20

## 2018-06-20 RX ORDER — QUETIAPINE FUMARATE 100 MG/1
100 TABLET, FILM COATED ORAL NIGHTLY
Qty: 30 TABLET | Refills: 3
Start: 2018-06-20 | End: 2019-06-20

## 2018-06-20 RX ORDER — GLUCAGON 1 MG
1 KIT INJECTION
Status: DISCONTINUED | OUTPATIENT
Start: 2018-06-20 | End: 2018-06-20 | Stop reason: HOSPADM

## 2018-06-20 RX ORDER — IBUPROFEN 200 MG
24 TABLET ORAL
Status: DISCONTINUED | OUTPATIENT
Start: 2018-06-20 | End: 2018-06-20 | Stop reason: HOSPADM

## 2018-06-20 RX ADMIN — IPRATROPIUM BROMIDE AND ALBUTEROL SULFATE 3 ML: .5; 3 SOLUTION RESPIRATORY (INHALATION) at 01:06

## 2018-06-20 RX ADMIN — IPRATROPIUM BROMIDE AND ALBUTEROL SULFATE 3 ML: .5; 3 SOLUTION RESPIRATORY (INHALATION) at 08:06

## 2018-06-20 RX ADMIN — OXYCODONE HYDROCHLORIDE AND ACETAMINOPHEN 1 TABLET: 5; 325 TABLET ORAL at 02:06

## 2018-06-20 RX ADMIN — OXYCODONE HYDROCHLORIDE AND ACETAMINOPHEN 1 TABLET: 5; 325 TABLET ORAL at 01:06

## 2018-06-20 RX ADMIN — OXYCODONE HYDROCHLORIDE AND ACETAMINOPHEN 1 TABLET: 5; 325 TABLET ORAL at 10:06

## 2018-06-20 RX ADMIN — HEPARIN SODIUM 5000 UNITS: 5000 INJECTION, SOLUTION INTRAVENOUS; SUBCUTANEOUS at 05:06

## 2018-06-20 RX ADMIN — FUROSEMIDE 20 MG: 20 TABLET ORAL at 10:06

## 2018-06-20 RX ADMIN — CEFTRIAXONE SODIUM 1 G: 1 INJECTION, POWDER, FOR SOLUTION INTRAMUSCULAR; INTRAVENOUS at 05:06

## 2018-06-20 RX ADMIN — IPRATROPIUM BROMIDE AND ALBUTEROL SULFATE 3 ML: .5; 3 SOLUTION RESPIRATORY (INHALATION) at 12:06

## 2018-06-20 RX ADMIN — METOPROLOL TARTRATE 25 MG: 25 TABLET ORAL at 02:06

## 2018-06-20 RX ADMIN — LEVOTHYROXINE SODIUM 175 MCG: 150 TABLET ORAL at 07:06

## 2018-06-20 RX ADMIN — METOPROLOL TARTRATE 25 MG: 25 TABLET ORAL at 05:06

## 2018-06-20 RX ADMIN — POLYETHYLENE GLYCOL 3350 17 G: 17 POWDER, FOR SOLUTION ORAL at 10:06

## 2018-06-20 NOTE — ASSESSMENT & PLAN NOTE
- Admitted to MICU, requiring intubation. Differential diagnosis includes infection in the setting of possible pneumonia and UTI, polypharmacy as patient has multiple medications she is taking (some of which we have no record her taking) or potentially new onset neurological cause, dehydration and HERNAN from recently being discharged from hospital. Extubated on 6/17.   - MRI/ MRA head showed no new acute neurological findings  - Sputum Cx: Normal respiratory ganesh, UCx: E. Coli <50CFU. BCx: NGTD X4  - On Zosyn from MICU, last dose 6/18/2018. Change to Rocephin   - Discussed with daughter at bedside, they need to bring in patient home medications, as pt was not aware of which meds she took. Pt report taking Seroquel 100mg in AM and ?600mg QHS (she took it when she got home from SNF), chart review showed 100mg BID, will do QHS dose only. Awaiting fax from PCP of current med list   - Will consider EEG if we have further mentation issue

## 2018-06-20 NOTE — PLAN OF CARE
Per pt, she would like PCP f/u with DR. Shad Burkett.  Appt made on June 26 th at 3:15  Updated AVS  Cm faxing over progress summary notes as well  Future Appointments  Date Time Provider Department Center   7/2/2018 9:00 AM Bereket Ballard MD [facility]

## 2018-06-20 NOTE — PLAN OF CARE
12:20 PM  SW received home health orders. Pt's preference is [facility]. [facility]. Will f/u as needed.    Lilian Manley LMSW   [facility]  Ext [phone]

## 2018-06-20 NOTE — PLAN OF CARE
[facility]    HOME HEALTH ORDERS  FACE TO FACE ENCOUNTER    Patient Name: Olimpia Cardoza  YOB: 1950    PCP: Sahd Burkett MD   PCP Address: [address] / [place] [address]  PCP Phone Number: [phone]  PCP Fax: [phone]    Encounter Date: 06/20/2018    Admit to Home Health    Diagnoses:  Active Hospital Problems    Diagnosis  POA    *Encephalopathy, metabolic [G93.41]  Yes    Abdominal distension [R14.0]  Yes    Pleural effusion [J90]  Yes    Pneumonia due to infectious organism [J18.9]  Yes    HERNAN (acute kidney injury) [N17.9]  Yes    Urinary tract infection with hematuria [N39.0, R31.9]  Yes    COPD (chronic obstructive pulmonary disease) [J44.9]  Yes    Other specified hypothyroidism [E03.8]  Yes    Right MCA ischemic stroke, aborted by t-PA [I63.411]  Yes    Type 2 diabetes mellitus, without long-term current use of insulin [E11.9]  Yes     17 yrs      Essential hypertension [I10]  Yes    Panlobular emphysema [J43.1]  Yes     Hx smoking; home oxygen 2 l for past 4 years      Depression [F32.9]  Yes      Resolved Hospital Problems    Diagnosis Date Resolved POA    On mechanically assisted ventilation [Z99.11] 06/17/2018 Not Applicable       Future Appointments  Date Time Provider Department Center   7/2/2018 9:00 AM Bereket Ballard MD [facility]           I have seen and examined this patient face to face today. My clinical findings that support the need for the home health skilled services and home bound status are the following:  Weakness/numbness causing balance and gait disturbance due to Weakness/Debility making it taxing to leave home.    Allergies:  Review of patient's allergies indicates:   Allergen Reactions    Codeine Palpitations       Diet: diabetic diet: 2000 calorie    Activities: activity as tolerated    Nursing:   SN to complete comprehensive assessment including routine vital signs. Instruct on disease process and s/s of  complications to report to MD. Review/verify medication list sent home with the patient at time of discharge  and instruct patient/caregiver as needed. Frequency may be adjusted depending on start of care date.    Notify MD if SBP > 160 or < 90; DBP > 90 or < 50; HR > 120 or < 50; Temp > 101; Other:         CONSULTS:    Physical Therapy to evaluate and treat. Evaluate for home safety and equipment needs; Establish/upgrade home exercise program. Perform / instruct on therapeutic exercises, gait training, transfer training, and Range of Motion.  Occupational Therapy to evaluate and treat. Evaluate home environment for safety and equipment needs. Perform/Instruct on transfers, ADL training, ROM, and therapeutic exercises.   to evaluate for community resources/long-range planning.  Aide to provide assistance with personal care, ADLs, and vital signs.    MISCELLANEOUS CARE:  N/A    WOUND CARE ORDERS  n/a      Medications: Review discharge medications with patient and family and provide education.      Current Discharge Medication List      CONTINUE these medications which have CHANGED    Details   escitalopram oxalate (LEXAPRO) 10 MG tablet Take 2 tablets (20 mg total) by mouth once daily.      QUEtiapine (SEROQUEL) 100 MG Tab Take 1 tablet (100 mg total) by mouth every evening.  Qty: 30 tablet, Refills: 3         CONTINUE these medications which have NOT CHANGED    Details   busPIRone (BUSPAR) 15 MG tablet Take 15 mg by mouth 2 (two) times daily.      meloxicam (MOBIC) 7.5 MG tablet Take 7.5 mg by mouth once daily.      potassium chloride SA (K-DUR,KLOR-CON) 10 MEQ tablet Take 10 mEq by mouth 2 (two) times daily.      albuterol (PROVENTIL) 5 mg/mL nebulizer solution Take 2.5 mg by nebulization 3 (three) times daily.       alendronate (FOSAMAX) 70 MG tablet Take 70 mg by mouth every 7 days.      ascorbic acid (VITAMIN C) 500 MG tablet Take 500 mg by mouth once daily.      aspirin 81 MG Chew Take 81 mg by  mouth every evening. Two 81mg  Once a day      buPROPion (WELLBUTRIN XL) 300 MG 24 hr tablet Take 300 mg by mouth once daily.      calcium carbonate (OS-CAREY) 600 mg (1,500 mg) Tab Take 600 mg by mouth 2 (two) times daily with meals.      fluticasone-vilanterol (BREO) 100-25 mcg/dose diskus inhaler Inhale 1 puff into the lungs once daily. Controller      furosemide (LASIX) 20 MG tablet Take 1 tablet (20 mg total) by mouth 2 (two) times daily.  Qty: 60 tablet, Refills: 6      glimepiride (AMARYL) 2 MG tablet Take 2 mg by mouth before breakfast.      levothyroxine (SYNTHROID, LEVOTHROID) 175 MCG tablet Take 175 mcg by mouth once daily.      metFORMIN (GLUCOPHAGE) 500 MG tablet Take 1 tablet (500 mg total) by mouth 2 (two) times daily with meals.  Qty: 180 tablet, Refills: 3      metoprolol tartrate (LOPRESSOR) 25 MG tablet Take 1 tablet (25 mg total) by mouth every 8 (eight) hours.  Qty: 90 tablet, Refills: 11      pantoprazole (PROTONIX) 40 MG tablet Take 40 mg by mouth once daily.      simvastatin (ZOCOR) 40 MG tablet Take 1 tablet (40 mg total) by mouth every evening.  Qty: 90 tablet, Refills: 3      tiotropium (SPIRIVA) 18 mcg inhalation capsule Inhale 18 mcg into the lungs once daily.         STOP taking these medications       ipratropium (ATROVENT) 0.02 % nebulizer solution Comments:   Reason for Stopping:         ALPRAZolam (XANAX) 0.25 MG tablet Comments:   Reason for Stopping:         amitriptyline (ELAVIL) 50 MG tablet Comments:   Reason for Stopping:         baclofen (LIORESAL) 10 MG tablet Comments:   Reason for Stopping:         dextromethorphan-guaifenesin  mg (MUCINEX DM)  mg per 12 hr tablet Comments:   Reason for Stopping:         HYDROcodone-acetaminophen (NORCO) 5-325 mg per tablet Comments:   Reason for Stopping:         hydrOXYzine (ATARAX) 50 MG tablet Comments:   Reason for Stopping:               I certify that this patient is confined to her home and needs intermittent skilled  nursing care, physical therapy and occupational therapy.

## 2018-06-20 NOTE — PROGRESS NOTES
"[facility] Hospital Medicine  Progress Note    Patient Name: Olimpia Cardoza  MRN: [id number]  Patient Class: IP- Inpatient   Admission Date: 6/16/2018  Length of Stay: 4 days  Attending Physician: Kirstin Corona MD  Primary Care Provider: Shad Burkett MD    [facility] Medicine Team: [facility] HOSP MED A Kirstin Corona MD    Subjective:     Principal Problem:Encephalopathy, metabolic    HPI:  67 y.o. CF with PMH of HTN, HLD, COPD, type 2 DM,  H/o of thyroidectomy and most recently CVA presents via EMS 2/2 to new onset altered mental status.On 6/5/18 patient had been in her usual state of health where she fell, broke several ribs, and then became altered.  Patient suffered a right MCA stroke, treated with TPA at [facility], and admitted to Neuro ICU on 6/6/18. She was subsequently discharge to a O-SNF where she completed rehab. Patient discharged to home yesterday. At that time patient was ambulating with assistance, talking, and eating. Last time patient was seen conscious was 10pm last night. Early the morning she was found to be confused and slurring her words. Her mentation detiorated and then she became unresponsive.   Found to have possible RLL pneumonia and UTI with a concern for sepsis. Patient was given Rocephin, azithromycin and gentamicin prior to arrival to [facility]. At OSH patient was intubated for worsening status. Per son, while patient was in rehab she was noted to be coughing up "phglem". He also states that he noted blood in her martell bag but "they didn't do anything about it". He states that besides her cough she denied any fever, chills, nausea, vomiting, CP, SOB, hematuria, dysuria. He does state that his mother has been constipated.      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 " Lactate 0.8, HERNAN noted with Cr 1.5, and dirty UA. CXR showed "There is opacification of the right lung base suggesting a layering pleural effusion with associated atelectasis and/or consolidation. " Patient is intubated and currently on propofol drip. MICU consulted 2/2 to concerns of new AMS and sepsis. Patient has full code status.      History provided by chart review and by talking to son, Patric, at bedside.    Hospital Course:  MICU Course:  Admitted to MICU on 06/16/2018 for concerns of new onset AMS and sepsis. CXR shows possible consolidation. Dirty UA noted. Patient started on vancomycin and zosyn for broad spectrum coverage. Will attempt to wean off sedation and assess mental status. Urine culture pending, blood culture pending. Successfully extubated to nasal cannula on 6/17. Continue abx coverage. BP has remained stable. Resp culture growing gram pos and gram neg rods. Thoracentesis attempted on 6/17 with small volume return. Martell removed on 6/17 as well with subsequent urinary retention on first post-martell removal protocol bladder scan which required straight cath. Suspect encephalopathy partially due in part to polypharmacy after patient resumed home meds following discharge from rehab    Step down to hospital medicine 6/18/2018    Interval History:  Back to baseline per daughter and feels well. PCP to fax over med list. PT/OT consulted for placeement    Review of Systems   Constitutional: Negative for fatigue and fever.   HENT: Negative for sinus pressure and sore throat.    Respiratory: Positive for shortness of breath. Negative for cough and chest tightness.    Cardiovascular: Negative for chest pain and leg swelling.   Gastrointestinal: Positive for abdominal distention, abdominal pain and constipation. Negative for diarrhea, nausea, rectal pain and vomiting.   Genitourinary: Negative for dysuria and " flank pain.   Musculoskeletal: Negative for back pain, myalgias and neck pain.   Psychiatric/Behavioral: Negative for confusion.     Objective:     Vital Signs (Most Recent):  Temp: 96.2 °F (35.7 °C) (06/19/18 2342)  Pulse: 77 (06/20/18 0010)  Resp: 18 (06/20/18 0010)  BP: 116/60 (06/19/18 2342)  SpO2: (!) 91 % (06/20/18 0010) Vital Signs (24h Range):  Temp:  [96.2 °F (35.7 °C)-99.5 °F (37.5 °C)] 96.2 °F (35.7 °C)  Pulse:  [56-99] 77  Resp:  [14-18] 18  SpO2:  [91 %-97 %] 91 %  BP: (103-129)/(51-92) 116/60     Weight: 67.5 kg (148 lb 13 oz)  Body mass index is 28.12 kg/m².    Intake/Output Summary (Last 24 hours) at 06/20/18 0019  Last data filed at 06/19/18 0600   Gross per 24 hour   Intake                0 ml   Output             1500 ml   Net            -1500 ml      Physical Exam   Constitutional: She is oriented to person, place, and time. She appears well-developed and well-nourished.   Cardiovascular: Normal rate, regular rhythm and normal heart sounds.  Exam reveals no gallop and no friction rub.    No murmur heard.  Pulmonary/Chest: Effort normal and breath sounds normal. No respiratory distress. She has no wheezes. She has no rales.   Abdominal: Soft. Bowel sounds are normal. She exhibits no distension. There is no tenderness.   Musculoskeletal: Normal range of motion.   Neurological: She is alert and oriented to person, place, and time. She displays normal reflexes. No cranial nerve deficit. Coordination normal.   Skin: Skin is warm and dry.       MELD-Na score: 9 at 6/19/2018  6:03 AM  MELD score: 9 at 6/19/2018  6:03 AM  Calculated from:  Serum Creatinine: 1.3 mg/dL at 6/19/2018  6:03 AM  Serum Sodium: 141 mmol/L (Rounded to 137) at 6/19/2018  6:03 AM  Total Bilirubin: 0.4 mg/dL (Rounded to 1) at 6/19/2018  6:03 AM  INR(ratio): 1 at 6/19/2018  6:03 AM  Age: 67 years    Significant Labs:  CBC:    Recent Labs  Lab 06/18/18  0416 06/19/18  0603   WBC 7.06 8.10   HGB 7.1* 7.4*   HCT 23.3* 24.8*     "303     CMP:    Recent Labs  Lab 06/18/18  0416 06/19/18  0603    141   K 4.1 4.5    105   CO2 23 22*   * 125*   BUN 22 16   CREATININE 1.4 1.3   CALCIUM 7.2* 7.3*   PROT 5.8* 6.0   ALBUMIN 2.3* 2.2*   BILITOT 0.4 0.4   ALKPHOS 118 137*   AST 15 22   ALT 15 17   ANIONGAP 11 14   EGFRNONAA 38.6* 42.3*     PTINR:    Recent Labs  Lab 06/18/18  0416 06/19/18  0603   INR 1.1 1.0       Significant Procedures:   Dobutamine Stress Test with Color Flow: No results found for this or any previous visit.    None    Assessment/Plan:      * Encephalopathy, metabolic    - Admitted to MICU, requiring intubation. Differential diagnosis includes infection in the setting of possible pneumonia and UTI, polypharmacy as patient has multiple medications she is taking (some of which we have no record her taking) or potentially new onset neurological cause, dehydration and HERNAN from recently being discharged from hospital. Extubated on 6/17.   - MRI/ MRA head showed no new acute neurological findings  - Sputum Cx: Normal respiratory ganesh, UCx: E. Coli <50CFU. BCx: NGTD X4  - On Zosyn from MICU, last dose 6/18/2018. Change to Rocephin   - Discussed with daughter at bedside, they need to bring in patient home medications, as pt was not aware of which meds she took. Pt report taking Seroquel 100mg in AM and ?600mg QHS (she took it when she got home from SNF), chart review showed 100mg BID, will do QHS dose only. Awaiting fax from PCP of current med list   - Will consider EEG if we have further mentation issue                      Abdominal distension    - Suspect constipation  - martell place given urinary retension >500cc yesterday; will attempt voiding trial today           Pneumonia due to infectious organism    Panlobular emphysema  Pleural effusion  - Thoracentesis done 6/17, bloody sample per report, "specimen from thoracentesis was clotted. They were able to run LDH, Total Protein and Culture. Cell count cannot be " "facilitated"  - Abx change from Zosyn to Rocephin and monitor          Urinary tract infection with hematuria    - dirty UA on admission  - urine culture E.coli  - change from Zosyn to IV Rocephin        HERNAN (acute kidney injury)    - Cr 1.6,  baseline 1.1  - doing better, near baseline             COPD (chronic obstructive pulmonary disease)    - chronic COPD, on home oxygen  - continue albuterol and breo treatment while inpatient              Right MCA ischemic stroke, aborted by t-PA    - s/p Neuro ICU stay  - discharged home from rehab on 6/15/18  - no acute changes noted on MRI/MRA on 6/16/18  - continue PT/OT eval here        Depression    - Per ICU note, "son cannot answer which medications patient taking or if she took medications prior to arrival "  - given presentation is concerning for polypharmacy   - multiple drugs on med rec included : alprazolam, bupropion, escitalopram, and seroquel           Essential hypertension    - currently normotensive   - On lasix, restart for fluid removal  - metoprolol for ?HR, will restart          Type 2 diabetes mellitus, without long-term current use of insulin    - SSI while inpatient  - Home medications include glimeiperide, metformin             VTE Risk Mitigation         Ordered     heparin (porcine) injection 5,000 Units  Every 8 hours      06/16/18 1533     IP VTE HIGH RISK PATIENT  Once      06/16/18 1533              Kirstin Corona MD  Department of Hospital Medicine   [facility]  "

## 2018-06-20 NOTE — PLAN OF CARE
Problem: Diabetes, Type 2 (Adult)  Intervention: Optimize Glycemic Control  Glucose at MN require no sliding scale insulin.  Spoke with on call about changing frequency since pt is now on a diet.  Orders changed from Q6, to AC/HS.        Problem: Patient Care Overview  Goal: Individualization & Mutuality  Outcome: Ongoing (interventions implemented as appropriate)  Pt remained AAO x 4 during shift.  Was able to void after martell was D/C'd.  Pain medication given twice and was effective in decreasing pain to an acceptable level.  Sister stayed at bedside.  No complaints.  Will continue to monitor.

## 2018-06-20 NOTE — ASSESSMENT & PLAN NOTE
- Suspect constipation  - martell place given urinary retension >500cc yesterday; will attempt voiding trial today

## 2018-06-20 NOTE — SUBJECTIVE & OBJECTIVE
Interval History:  Back to baseline per daughter and feels well. PCP to fax over med list. PT/OT consulted for placeement    Review of Systems   Constitutional: Negative for fatigue and fever.   HENT: Negative for sinus pressure and sore throat.    Respiratory: Positive for shortness of breath. Negative for cough and chest tightness.    Cardiovascular: Negative for chest pain and leg swelling.   Gastrointestinal: Positive for abdominal distention, abdominal pain and constipation. Negative for diarrhea, nausea, rectal pain and vomiting.   Genitourinary: Negative for dysuria and flank pain.   Musculoskeletal: Negative for back pain, myalgias and neck pain.   Psychiatric/Behavioral: Negative for confusion.     Objective:     Vital Signs (Most Recent):  Temp: 96.2 °F (35.7 °C) (06/19/18 2342)  Pulse: 77 (06/20/18 0010)  Resp: 18 (06/20/18 0010)  BP: 116/60 (06/19/18 2342)  SpO2: (!) 91 % (06/20/18 0010) Vital Signs (24h Range):  Temp:  [96.2 °F (35.7 °C)-99.5 °F (37.5 °C)] 96.2 °F (35.7 °C)  Pulse:  [56-99] 77  Resp:  [14-18] 18  SpO2:  [91 %-97 %] 91 %  BP: (103-129)/(51-92) 116/60     Weight: 67.5 kg (148 lb 13 oz)  Body mass index is 28.12 kg/m².    Intake/Output Summary (Last 24 hours) at 06/20/18 0019  Last data filed at 06/19/18 0600   Gross per 24 hour   Intake                0 ml   Output             1500 ml   Net            -1500 ml      Physical Exam   Constitutional: She is oriented to person, place, and time. She appears well-developed and well-nourished.   Cardiovascular: Normal rate, regular rhythm and normal heart sounds.  Exam reveals no gallop and no friction rub.    No murmur heard.  Pulmonary/Chest: Effort normal and breath sounds normal. No respiratory distress. She has no wheezes. She has no rales.   Abdominal: Soft. Bowel sounds are normal. She exhibits no distension. There is no tenderness.   Musculoskeletal: Normal range of motion.   Neurological: She is alert and oriented to person, place, and  time. She displays normal reflexes. No cranial nerve deficit. Coordination normal.   Skin: Skin is warm and dry.       MELD-Na score: 9 at 6/19/2018  6:03 AM  MELD score: 9 at 6/19/2018  6:03 AM  Calculated from:  Serum Creatinine: 1.3 mg/dL at 6/19/2018  6:03 AM  Serum Sodium: 141 mmol/L (Rounded to 137) at 6/19/2018  6:03 AM  Total Bilirubin: 0.4 mg/dL (Rounded to 1) at 6/19/2018  6:03 AM  INR(ratio): 1 at 6/19/2018  6:03 AM  Age: 67 years    Significant Labs:  CBC:    Recent Labs  Lab 06/18/18  0416 06/19/18  0603   WBC 7.06 8.10   HGB 7.1* 7.4*   HCT 23.3* 24.8*    303     CMP:    Recent Labs  Lab 06/18/18  0416 06/19/18  0603    141   K 4.1 4.5    105   CO2 23 22*   * 125*   BUN 22 16   CREATININE 1.4 1.3   CALCIUM 7.2* 7.3*   PROT 5.8* 6.0   ALBUMIN 2.3* 2.2*   BILITOT 0.4 0.4   ALKPHOS 118 137*   AST 15 22   ALT 15 17   ANIONGAP 11 14   EGFRNONAA 38.6* 42.3*     PTINR:    Recent Labs  Lab 06/18/18  0416 06/19/18  0603   INR 1.1 1.0       Significant Procedures:   Dobutamine Stress Test with Color Flow: No results found for this or any previous visit.    None

## 2018-06-20 NOTE — NURSING
Pt discharged home. Pt IV removed. Pt has all personal belongings. Pt awaiting prescription form MD and NIXON to escort off unit.

## 2018-06-20 NOTE — PLAN OF CARE
Per pt, she would like PCP f/u with DR. Shad Burkett in the late afternoon.     06/20/18 1258   Final Note   Assessment Type Final Discharge Note   Discharge Disposition Home-Health   Hospital Follow Up  Appt(s) scheduled? Yes   Discharge plans and expectations educations in teach back method with documentation complete? Yes   Right Care Referral Info   Post Acute Recommendation Home-care        06/20/18 1258   Final Note   Assessment Type Final Discharge Note   Discharge Disposition Home-Health   Hospital Follow Up  Appt(s) scheduled? Yes   Discharge plans and expectations educations in teach back method with documentation complete? Yes   Right Care Referral Info   Post Acute Recommendation Home-care        06/20/18 1258   Final Note   Assessment Type Final Discharge Note   Discharge Disposition Home-Health   Hospital Follow Up  Appt(s) scheduled? Yes   Discharge plans and expectations educations in teach back method with documentation complete? Yes   Right Care Referral Info   Post Acute Recommendation Home-care     Appt made on June 26 th at 3:15  Updated AVS  Cm faxed over progress summary notes as well  Future Appointments  Date Time Provider Department Center   7/2/2018 9:00 AM Bereket Ballard MD [facility]

## 2018-06-21 LAB
BACTERIA BLD CULT: NORMAL
BACTERIA BLD CULT: NORMAL

## 2018-06-22 ENCOUNTER — PATIENT OUTREACH (OUTPATIENT)
Dept: ADMINISTRATIVE | Facility: CLINIC | Age: 68
End: 2018-06-22

## 2018-06-22 LAB — BACTERIA FLD CULT: NORMAL

## 2018-06-22 NOTE — PATIENT INSTRUCTIONS
Head Trauma (Traumatic Brain Injury)     After treatment for head trauma, know which symptoms to watch for. Then call for medical help.     Head trauma can be fatal. The effects from some types of head trauma may not appear right away. So its important to get medical attention for any severe head injury.  Warning  Do not move a person with a head injury unless it is necessary to save his or her life. Call 911 and wait for help. Head trauma often comes with severe neck injury. Sudden movements can result in paralysis.   Call 911  Call 911 right away after a head blow that results in:  · Prolonged loss of consciousness (more than a few seconds) or somnolence (prolonged drowsiness)   · Memory problems or confusion  · Severe headache  · Nausea or vomiting  · Pupils dilated or different sizes  · Severe bleeding  · Blood or watery fluid leaking from nose or ears  · Broken skull or a soft spot on skull  · Slow breathing  · Loss of balance  · Weakness of or trouble using an arm or leg  · Slurred speech  · Seizure  What to expect in the ER  Here is what will happen:   · A neurological exam is likely. This is a series of simple questions and tests that evaluate the nervous system. Reflexes, response to pain, and mental state are assessed.  · The healthcare provider shines a bright light into the eyes to check how the pupils respond. This can reveal more about any head injuries.  · A computed tomography (CT) scan may be done. A CT scan does not always need to be done, especially if symptoms are minor or have resolved. This test combines X-rays and computer scans to create detailed images of the brain.  Treatment for head trauma  Here is what is generally done:   · Sometimes, severe head injuries cause bleeding on the brain that requires immediate surgery. In certain cases, the injured person will be watched closely and taken for surgery only if injuries become worse. After surgery, special care helps prevent further brain  damage.  · Minor head trauma may need little treatment beyond pain control. The healthcare provider may suggest using cold packs to reduce swelling and pain.  Once you are home  At home, call 911 right away if the affected person:  · Becomes very drowsy or confused  · Has a headache or trouble seeing  · Has a stiff neck or muscle weakness  · Vomits  · Has seizures  Date Last Reviewed: 10/8/2015  © 8742-6261 [employer]. [address] [place] [address], [place] [address]. All rights reserved. This information is not intended as a substitute for professional medical care. Always follow your healthcare professional's instructions.

## 2018-06-23 NOTE — DISCHARGE SUMMARY
"[facility] Hospital Medicine  Discharge Summary      Patient Name: Olimpia Cardoza  MRN: [id number]  Admission Date: 6/16/2018  Hospital Length of Stay: 4 days  Discharge Date and Time: 6/20/2018  3:07 PM  Attending Physician: No att. providers found   Discharging Provider: Kirstin Corona MD  Primary Care Provider: Shad Burkett MD    [facility] Medicine Team: [facility] A Kirstin Corona MD    HPI:  67 y.o. CF with PMH of HTN, HLD, COPD, type 2 DM,  H/o of thyroidectomy and most recently CVA presents via EMS 2/2 to new onset altered mental status.On 6/5/18 patient had been in her usual state of health where she fell, broke several ribs, and then became altered.  Patient suffered a right MCA stroke, treated with TPA at [facility], and admitted to Neuro ICU on 6/6/18. She was subsequently discharge to a rehab facility where she completed rehab. Patient discharged from rehab yesterday. At that time patient was ambulating with assistance, talking, and eating. Last time patient was seen conscious was 10pm last night. Early the morning she was found to be confused and slurring her words. Her mentation detiorated and then she became unresponsive.   Found to have possible RLL pneumonia and UTI with a concern for sepsis. Patient was given Rocephin, azithromycin and gentamicin prior to arrival to [facility] ED. At OSH patient was intubated for worsening status. Per son, while patient was in rehab she was noted to be coughing up "phglem". He also states that he noted blood in her martell bag but "they didn't do anything about it". He states that besides her cough she denied any fever, chills, nausea, vomiting, CP, SOB, hematuria, dysuria. He does state that his mother has been constipated.      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 " Lactate 0.8, HERNAN noted with Cr 1.5, and dirty UA. CXR showed "There is opacification of the right lung base suggesting a layering pleural effusion with associated atelectasis and/or consolidation. " Patient is intubated and currently on propofol drip. MICU consulted 2/2 to concerns of new AMS and sepsis. Patient has full code status.      History provided by chart review and by talking to son, Patric, at bedside.    * No surgery found *      Hospital Course:   Admitted to MICU on 06/16/2018 for concerns of new onset AMS and sepsis. CXR shows possible consolidation. Dirty UA noted. Patient started on vancomycin and zosyn for broad spectrum coverage. Will attempt to wean off sedation and assess mental status. Successfully extubated to nasal cannula on 6/17. Continued abx coverage. BP has remained stable. Resp culture growing gram pos and gram neg rods. Thoracentesis attempted on 6/17 with small volume return. Martell removed on 6/17 as well with subsequent urinary retention on first post-martell removal protocol bladder scan which required straight cath. Urinary symptoms resolved by time of discharge. Suspect encephalopathy partially due in part to polypharmacy after patient resumed home meds following discharge from rehab. . Pt was stepped down to hospital medicine 6/18/2018. Pt was monitored and mental status improved to baseline. Of note, pt report taking Seroquel 100mg in AM and ?600mg QHS (she took it when she got home from SNF), chart review showed 100mg BID, was discharged on QHS dose only. Pt's meds were reconciled with her current med list from PCP. Attempt was made to reduce polypharmacyPt was discharged to f/u with PCP.     Consults:   Consults         Status Ordering Provider     Inpatient consult to Critical Care Medicine  Once     Provider:  (Not yet assigned)    Completed TOMMY, " NEVA     Inpatient consult to Registered Dietitian/Nutritionist  Once     Provider:  (Not yet assigned)    TYRON Banks          Final Active Diagnoses:    Diagnosis Date Noted POA    PRINCIPAL PROBLEM:  Encephalopathy, metabolic [G93.41] 06/16/2018 Yes    Abdominal distension [R14.0] 06/18/2018 Yes    Pleural effusion [J90] 06/17/2018 Yes    Pneumonia due to infectious organism [J18.9] 06/17/2018 Yes    HERNAN (acute kidney injury) [N17.9] 06/16/2018 Yes    Urinary tract infection with hematuria [N39.0, R31.9] 06/16/2018 Yes    COPD (chronic obstructive pulmonary disease) [J44.9] 06/08/2018 Yes    Other specified hypothyroidism [E03.8] 06/05/2018 Yes    Right MCA ischemic stroke, aborted by t-PA [I63.411] 06/04/2018 Yes    Type 2 diabetes mellitus, without long-term current use of insulin [E11.9] 02/24/2015 Yes    Essential hypertension [I10] 02/24/2015 Yes    Panlobular emphysema [J43.1] 02/24/2015 Yes    Depression [F32.9] 02/24/2015 Yes      Problems Resolved During this Admission:    Diagnosis Date Noted Date Resolved POA    On mechanically assisted ventilation [Z99.11] 06/16/2018 06/17/2018 Not Applicable      Discharged Condition: good    Disposition: Home or Self Care    Follow Up:  Follow-up Information     Shad Burkett MD. Schedule an appointment as soon as possible for a visit in 1 week.    Specialty:  Internal Medicine  Contact information:  [address] [place] [address] [address]  [place] [address]  [phone]             Please follow up.    Why:  DR SHAD BURKETT  ON JUNE 26TH TUESDAY 3:15 PM               Patient Instructions:     Activity as tolerated       Medications:  Reconciled Home Medications:      Medication List      START taking these medications    oxyCODONE-acetaminophen 5-325 mg per tablet  Commonly known as:  PERCOCET  Take 1 tablet by mouth every 8 (eight) hours as needed for Pain.        CHANGE how you take these medications    metFORMIN 500 MG tablet  Commonly known as:   GLUCOPHAGE  Take 1 tablet (500 mg total) by mouth 2 (two) times daily with meals.  What changed:  how much to take     QUEtiapine 100 MG Tab  Commonly known as:  SEROQUEL  Take 1 tablet (100 mg total) by mouth every evening.  What changed:  when to take this        CONTINUE taking these medications    albuterol 5 mg/mL nebulizer solution  Commonly known as:  PROVENTIL  Take 2.5 mg by nebulization 3 (three) times daily.     alendronate 70 MG tablet  Commonly known as:  FOSAMAX  Take 70 mg by mouth every 7 days.     aspirin 81 MG Chew  Take 81 mg by mouth every evening. Two 81mg  Once a day     buPROPion 300 MG 24 hr tablet  Commonly known as:  WELLBUTRIN XL  Take 300 mg by mouth once daily.     busPIRone 15 MG tablet  Commonly known as:  BUSPAR  Take 15 mg by mouth 2 (two) times daily.     calcium carbonate 600 mg calcium (1,500 mg) Tab  Commonly known as:  OS-CAREY  Take 600 mg by mouth 2 (two) times daily with meals.     escitalopram oxalate 10 MG tablet  Commonly known as:  LEXAPRO  Take 2 tablets (20 mg total) by mouth once daily.     fluticasone-vilanterol 100-25 mcg/dose diskus inhaler  Commonly known as:  BREO  Inhale 1 puff into the lungs once daily. Controller     furosemide 20 MG tablet  Commonly known as:  LASIX  Take 1 tablet (20 mg total) by mouth 2 (two) times daily.     glimepiride 2 MG tablet  Commonly known as:  AMARYL  Take 2 mg by mouth before breakfast.     levothyroxine 175 MCG tablet  Commonly known as:  SYNTHROID, LEVOTHROID  Take 175 mcg by mouth once daily.     meloxicam 7.5 MG tablet  Commonly known as:  MOBIC  Take 7.5 mg by mouth once daily.     metoprolol tartrate 25 MG tablet  Commonly known as:  LOPRESSOR  Take 1 tablet (25 mg total) by mouth every 8 (eight) hours.     pantoprazole 40 MG tablet  Commonly known as:  PROTONIX  Take 40 mg by mouth once daily.     potassium chloride SA 10 MEQ tablet  Commonly known as:  K-DUR,KLOR-CON  Take 10 mEq by mouth 2 (two) times daily.      simvastatin 40 MG tablet  Commonly known as:  ZOCOR  Take 1 tablet (40 mg total) by mouth every evening.     tiotropium 18 mcg inhalation capsule  Commonly known as:  SPIRIVA  Inhale 18 mcg into the lungs once daily.     VITAMIN C 500 MG tablet  Generic drug:  ascorbic acid (vitamin C)  Take 500 mg by mouth once daily.        STOP taking these medications    ALPRAZolam 0.25 MG tablet  Commonly known as:  XANAX     amitriptyline 50 MG tablet  Commonly known as:  ELAVIL     baclofen 10 MG tablet  Commonly known as:  LIORESAL     dextromethorphan-guaifenesin  mg  mg per 12 hr tablet  Commonly known as:  MUCINEX DM     HYDROcodone-acetaminophen 5-325 mg per tablet  Commonly known as:  NORCO     hydrOXYzine 50 MG tablet  Commonly known as:  ATARAX     ipratropium 0.02 % nebulizer solution  Commonly known as:  ATROVENT            Significant Diagnostic Studies: Labs: BMP: No results for input(s): GLU, NA, K, CL, CO2, BUN, CREATININE, CALCIUM, MG in the last 48 hours. and CBC No results for input(s): WBC, HGB, HCT, PLT in the last 48 hours.    Pending Diagnostic Studies:     Procedure Component Value Units Date/Time    Lactic acid, plasma [[id number]] Collected:  06/16/18 1539    Order Status:  Sent Lab Status:  In process Updated:  06/16/18 1540    Specimen:  Blood from Blood         Indwelling Lines/Drains at time of discharge:   Lines/Drains/Airways          No matching active lines, drains, or airways          Time spent on the discharge of patient: >30 minutes  Patient was seen and examined on the date of discharge and determined to be suitable for discharge.         Kirstin Corona MD  Department of Hospital Medicine  [facility]

## 2018-06-28 NOTE — PHYSICIAN QUERY
PT Name: Olimpia Cardoza  MR #: [id number]    Physician Query Form - Atrial Fibrillation Specificity     CDS/: Karen Houston               Contact information: [phone]     This form is a permanent document in the medical record.     Query Date: June 28, 2018    By submitting this query, we are merely seeking further clarification of documentation. Please utilize your independent clinical judgment when addressing the question(s) below.    The medical record contains the following:   Indicators     Supporting Clinical Findings Location in Medical Record   x Atrial Fibrillation  history of HTN, Afib, CVA, presenting to the ED for evaluation of altered mental status. ED 6/16   x EKG results Unusual P axis, possible ectopic atrial rhythm  Abnormal ECG  When compared with ECG of 08-JUN-2018 10:53,  Ectopic atrial rhythm has replaced Sinus rhythm EKG 6/16    Medication      Treatment      Other         Provider, please further specify the Atrial Fibrillation diagnosis.    [  ] Chronic  [x  ] Paroxysmal  [  ] Permanent  [  ] Other (please specify): ____________________________  [  ] Clinically Undetermined    Please document in your progress notes daily for the duration of treatment until resolved, and include in your discharge summary.

## 2018-06-28 NOTE — PHYSICIAN QUERY
"PT Name: Olimpia Cardoza  MR #: [id number]    Physician Query Form - Heart  Condition Clarification     CDS/: Karen Houston               Contact information: [phone]  This form is a permanent document in the medical record.     Query Date: June 28, 2018    By submitting this query, we are merely seeking further clarification of documentation. Please utilize your independent clinical judgment when addressing the question(s) below.    The medical record contains the following   Indicators     Supporting Clinical Findings Location in Medical Record    BNP      EF      Radiology findings     x Echo Results · Documented in record; Echo with nl EF but indeterminate diastolic function   D/C Summary 6/15 (Campos)    "Ascites" documented      "SOB" or "MUÑOZ" documented      "Hypoxia" documented     x Heart Failure documented Chronic congestive heart failure     Evaluated on 6/12/18  · Documented in record; Echo with nl EF but indeterminate diastolic function  · Continue therapy with lasix as she takes daily  · Will weigh daily to monitor and adjust regimen as necessary  · Given extra dose of lasix with increase cough on 6/11--on 6/12 cough and SOB much improved per patient     Evaluated on 6/13/18  · Patient reports increase cough  · Weight increased  · Patient clarified that she is taking lasix 20mg bid at home and not daily  · Changed lasix to bid dosing       D/C Summary 6/15 (Campos)    "Edema" documented     x Diuretics/Meds · Weight increased  · Patient clarified that she is taking lasix 20mg bid at home and not daily  · Changed lasix to bid dosing  D/C Summary 6/15 (Campos)    Treatment:      Other:          Provider, please specify diagnosis or diagnoses associated with above clinical findings.                               [ x ] Chronic Diastolic Heart Failure (EF > 40)*  [  ] Other Type of Heart Failure (please specify type): _________________________  [  ] Heart Failure Ruled Out  [  ] Other (please specify): " ___________________________________  [  ] Clinically Undetermined            *American Heart Association                                                                                                          Please document in your progress notes daily for the duration of treatment until resolved and include in your discharge summary.

## 2018-06-28 NOTE — PHYSICIAN QUERY
"PT Name: Olimpia Cardoza  MR #: [id number]     Physician Query Form - Documentation Clarification      CDS/: Karen Houston               Contact information: [phone]    This form is a permanent document in the medical record.     Query Date: June 28, 2018    By submitting this query, we are merely seeking further clarification of documentation. Please utilize your independent clinical judgment when addressing the question(s) below.    The Medical record reflects the following:    Supporting Clinical Findings Location in Medical Record   Early the morning she was found to be confused and slurring her words. Her mentation detiorated and then she became unresponsive.   Found to have possible RLL pneumonia and UTI with a concern for sepsis. Patient was given Rocephin, azithromycin and gentamicin prior to arrival to [facility] ED. At OSH patient was intubated for worsening status.      MICU consulted 2/2 to concerns of new AMS and sepsis. Patient has full code status.     Pneumonia due to infectious organism     Panlobular emphysema  Pleural effusion  - Thoracentesis done 6/17, bloody sample per report, "specimen from thoracentesis was clotted. They were able to run LDH, Total Protein and Culture. Cell count cannot be facilitated"  - Abx change from Zosyn to Rocephin and monitor             Urinary tract infection with hematuria     - dirty UA on admission  - urine culture E.coli  - change from Zosyn to IV Rocephin          HERNAN (acute kidney injury)     - Cr 1.6,  baseline 1.1  - doing better, near baseline               D/C Summary 6/20                              Progress note 6/19 (Chloe)   WBC 8.90...>6.94    Respiratory Culture Normal respiratory ganesh    Gram Stain (Respiratory) <10 epithelial cells per low power field.    Gram Stain (Respiratory) Rare WBC's    Gram Stain (Respiratory) Rare Gram positive rods    Gram Stain (Respiratory) Rare Gram negative rods                 Labs 6/16..>6/20    Urinary " Culture 6/16                                                                            Doctor, Please specify diagnosis or diagnoses associated with above clinical findings.    Provider Use Only      (    )  Sepsis r/t to Gram Positive Rods    (  x   )  Sepsis r/t to Gram Negative Rods    (     )  Sepsis r/t to Gram Negative and Positive rods    (     )  Sepsis undetermined    (     )  Ruled out Sepsis    (     )  Other_______________________                                                                                                                           [  ] Clinically undetermined

## 2018-07-23 ENCOUNTER — TELEPHONE (OUTPATIENT)
Dept: NEUROLOGY | Facility: CLINIC | Age: 68
End: 2018-07-23

## 2018-07-23 NOTE — TELEPHONE ENCOUNTER
----- Message from Roro Hernandez sent at 7/23/2018 11:48 AM CDT -----  Contact: Billy- Son   Needs Advice    Reason for call:    Would like to reschedule appt from 7-2-18. He states he would like to schedule closer around patient home.   Communication Preference: [phone]   Additional Information:

## 2018-08-03 NOTE — PT/OT/SLP DISCHARGE
Physical Therapy Discharge Summary    Name: Olimpia Cardoza  MRN: [id number]   Principal Problem: Embolic stroke involving right middle cerebral artery     Patient Discharged from acute Physical Therapy on 6/9/2018.  Please refer to prior PT noted date on 6/9/2018 for functional status.     Assessment:     Patient appropriate for care in another setting.    Objective:     GOALS:    Physical Therapy Goals        Problem: Physical Therapy Goal    Goal Priority Disciplines Outcome Goal Variances Interventions   Physical Therapy Goal     PT/OT, PT Ongoing (interventions implemented as appropriate)     Description:    Goals to be met by 6/15/2018    1. Pt will perform rolling to the R and L with min assistance. - MET  2. Pt will perform supine to sit from both sides of the bed with SBA using spinal precautions.  3. Pt will perform sit to supine with SBA using spinal precautions.  4. Pt will perform sit to stand transfers with CGA and no AD.  - MET  5. Pt will perform bed <> chair transfers with min assistance with or without AD. - MET  - Pt will perform bed <>C chair transfers with SBA  6. Pt will perform gait x 100 feet with CGA using RW.                       Reasons for Discontinuation of Therapy Services  Transfer to alternate level of care.      Plan:     Patient Discharged to: Skilled Nursing Facility.    Gabby Chappell, PT  8/3/2018

## 2024-03-28 NOTE — TELEPHONE ENCOUNTER
Called and left message for patient.    no chills/no decreased eating/drinking/no dizziness/no fever/no nausea/no pain/no tingling/no vomiting/no weakness